# Patient Record
Sex: FEMALE | Race: WHITE | NOT HISPANIC OR LATINO | Employment: OTHER | URBAN - METROPOLITAN AREA
[De-identification: names, ages, dates, MRNs, and addresses within clinical notes are randomized per-mention and may not be internally consistent; named-entity substitution may affect disease eponyms.]

---

## 2018-07-02 ENCOUNTER — OFFICE VISIT (OUTPATIENT)
Dept: URGENT CARE | Facility: CLINIC | Age: 63
End: 2018-07-02
Payer: COMMERCIAL

## 2018-07-02 VITALS
DIASTOLIC BLOOD PRESSURE: 66 MMHG | RESPIRATION RATE: 18 BRPM | WEIGHT: 128 LBS | SYSTOLIC BLOOD PRESSURE: 103 MMHG | HEART RATE: 83 BPM | HEIGHT: 70 IN | TEMPERATURE: 99 F | BODY MASS INDEX: 18.32 KG/M2 | OXYGEN SATURATION: 98 %

## 2018-07-02 DIAGNOSIS — N30.01 ACUTE CYSTITIS WITH HEMATURIA: Primary | ICD-10-CM

## 2018-07-02 DIAGNOSIS — Z76.89 ENCOUNTER TO ESTABLISH CARE: ICD-10-CM

## 2018-07-02 LAB
BILIRUB UR QL STRIP: NEGATIVE
GLUCOSE UR QL STRIP: NEGATIVE
KETONES UR QL STRIP: NEGATIVE
LEUKOCYTE ESTERASE UR QL STRIP: POSITIVE
PH, POC UA: 5.5 (ref 5–8)
POC BLOOD, URINE: POSITIVE
POC NITRATES, URINE: NEGATIVE
PROT UR QL STRIP: POSITIVE
SP GR UR STRIP: 1.01 (ref 1–1.03)
UROBILINOGEN UR STRIP-ACNC: ABNORMAL (ref 0.1–1.1)

## 2018-07-02 PROCEDURE — 99203 OFFICE O/P NEW LOW 30 MIN: CPT | Mod: 25,S$GLB,, | Performed by: NURSE PRACTITIONER

## 2018-07-02 PROCEDURE — 81003 URINALYSIS AUTO W/O SCOPE: CPT | Mod: QW,S$GLB,, | Performed by: NURSE PRACTITIONER

## 2018-07-02 PROCEDURE — 3008F BODY MASS INDEX DOCD: CPT | Mod: CPTII,S$GLB,, | Performed by: NURSE PRACTITIONER

## 2018-07-02 RX ORDER — NITROFURANTOIN 25; 75 MG/1; MG/1
100 CAPSULE ORAL 2 TIMES DAILY
Qty: 14 CAPSULE | Refills: 0 | Status: SHIPPED | OUTPATIENT
Start: 2018-07-02 | End: 2018-07-09

## 2018-07-02 RX ORDER — PHENAZOPYRIDINE HYDROCHLORIDE 100 MG/1
100 TABLET, FILM COATED ORAL 3 TIMES DAILY PRN
Qty: 21 TABLET | Refills: 0 | Status: SHIPPED | OUTPATIENT
Start: 2018-07-02 | End: 2018-07-09

## 2018-07-02 NOTE — PROGRESS NOTES
"Subjective:       Patient ID: Renata Garcia is a 62 y.o. female.    Vitals:  height is 5' 10" (1.778 m) and weight is 58.1 kg (128 lb). Her temperature is 99.3 °F (37.4 °C). Her blood pressure is 103/66 and her pulse is 83. Her respiration is 18 and oxygen saturation is 98%.     Chief Complaint: Urinary Tract Infection    The patient presents to the clinic today with complaints of worsening dysuria with urgency x 2 days. She has previously had a bladder infection 1 other time many years ago. Denies n/v/d. Denies fever. Mild suprapubic pain. Denies vaginal discharge or concerns for STDs.       Urinary Tract Infection    This is a new problem. The current episode started yesterday. The problem occurs every urination. The problem has been unchanged. The quality of the pain is described as aching and burning. The pain is at a severity of 5/10. The pain is mild. There has been no fever. She is sexually active. There is no history of pyelonephritis. Associated symptoms include frequency, hematuria and urgency. Pertinent negatives include no chills, nausea or vomiting. She has tried nothing for the symptoms. The treatment provided no relief.     Review of Systems   Constitution: Negative for chills and fever.   Skin: Negative for itching.   Musculoskeletal: Negative for back pain.   Gastrointestinal: Negative for abdominal pain, nausea and vomiting.   Genitourinary: Positive for dysuria, frequency, hematuria and urgency. Negative for genital sores, missed menses and non-menstrual bleeding.   All other systems reviewed and are negative.      Objective:      Physical Exam   Constitutional: She is oriented to person, place, and time. She appears well-developed and well-nourished.   HENT:   Head: Normocephalic and atraumatic.   Right Ear: External ear normal.   Left Ear: External ear normal.   Nose: Nose normal. No nasal deformity. No epistaxis.   Mouth/Throat: Oropharynx is clear and moist and mucous membranes are normal. "   Eyes: Conjunctivae and lids are normal.   Neck: Trachea normal, normal range of motion and phonation normal. Neck supple.   Cardiovascular: Normal rate, regular rhythm, normal heart sounds and normal pulses.    Pulmonary/Chest: Effort normal and breath sounds normal.   Abdominal: Soft. Normal appearance and bowel sounds are normal. She exhibits no distension and no mass. There is tenderness in the suprapubic area. There is no rigidity, no rebound, no guarding, no CVA tenderness, no tenderness at McBurney's point and negative Riojas's sign.   Neurological: She is alert and oriented to person, place, and time.   Skin: Skin is warm, dry and intact.   Psychiatric: She has a normal mood and affect. Her speech is normal and behavior is normal. Cognition and memory are normal.   Nursing note and vitals reviewed.        Results for orders placed or performed in visit on 07/02/18   POCT Urinalysis, Dipstick, Automated, W/O Scope   Result Value Ref Range    POC Blood, Urine Positive (A) Negative    POC Bilirubin, Urine Negative Negative    POC Urobilinogen, Urine Norm 0.1 - 1.1    POC Ketones, Urine Negative Negative    POC Protein, Urine Positive (A) Negative    POC Nitrates, Urine Negative Negative    POC Glucose, Urine Negative Negative    pH, UA 5.5 5 - 8    POC Specific Gravity, Urine 1.015 1.003 - 1.029    POC Leukocytes, Urine Positive (A) Negative       Assessment:       1. Acute cystitis with hematuria    2. Encounter to establish care        Plan:         Acute cystitis with hematuria  -     POCT Urinalysis, Dipstick, Automated, W/O Scope  -     nitrofurantoin, macrocrystal-monohydrate, (MACROBID) 100 MG capsule; Take 1 capsule (100 mg total) by mouth 2 (two) times daily. for 7 days  Dispense: 14 capsule; Refill: 0  -     phenazopyridine (PYRIDIUM) 100 MG tablet; Take 1 tablet (100 mg total) by mouth 3 (three) times daily as needed for Pain.  Dispense: 21 tablet; Refill: 0    Encounter to establish care  -      "Ambulatory referral to Internal Medicine      Patient Instructions   Bladder Infection, Female (Adult)    Urine is normally doesn't have any bacteria in it. But bacteria can get into the urinary tract from the skin around the rectum. Or they can travel in the blood from elsewhere in the body. Once they are in your urinary tract, they can cause infection in the urethra (urethritis), the bladder (cystitis), or the kidneys (pyelonephritis).  The most common place for an infection is in the bladder. This is called a bladder infection. This is one of the most common infections in women. Most bladder infections are easily treated. They are not serious unless the infection spreads to the kidney.  The phrases "bladder infection," "UTI," and "cystitis" are often used to describe the same thing. But they are not always the same. Cystitis is an inflammation of the bladder. The most common cause of cystitis is an infection.  Symptoms  The infection causes inflammation in the urethra and bladder. This causes many of the symptoms. The most common symptoms of a bladder infection are:  · Pain or burning when urinating  · Having to urinate more often than usual  · Urgent need to urinate  · Only a small amount of urine comes out  · Blood in urine  · Abdominal discomfort. This is usually in the lower abdomen above the pubic bone.  · Cloudy urine  · Strong- or bad-smelling urine  · Unable to urinate (urinary retention)  · Unable to hold urine in (urinary incontinence)  · Fever  · Loss of appetite  · Confusion (in older adults)  Causes  Bladder infections are not contagious. You can't get one from someone else, from a toilet seat, or from sharing a bath.  The most common cause of bladder infections is bacteria from the bowels. The bacteria get onto the skin around the opening of the urethra. From there, they can get into the urine and travel up to the bladder, causing inflammation and infection. This usually happens because " of:  · Wiping improperly after urinating. Always wipe from front to back.  · Bowel incontinence  · Pregnancy  · Procedures such as having a catheter inserted  · Older age  · Not emptying your bladder. This can allow bacteria a chance to grow in your urine.  · Dehydration  · Constipation  · Sex  · Use of a diaphragm for birth control   Treatment  Bladder infections are diagnosed by a urine test. They are treated with antibiotics and usually clear up quickly without complications. Treatment helps prevent a more serious kidney infection.  Medicines  Medicines can help in the treatment of a bladder infection:  · Take antibiotics until they are used up, even if you feel better. It is important to finish them to make sure the infection has cleared.  · You can use acetaminophen or ibuprofen for pain, fever, or discomfort, unless another medicine was prescribed. If you have chronic liver or kidney disease, talk with your healthcare provider before using these medicines. Also talk with your provider if you've ever had a stomach ulcer or gastrointestinal bleeding, or are taking blood-thinner medicines.  · If you are given phenazopydridine to reduce burning with urination, it will cause your urine to become a bright orange color. This can stain clothing.  Care and prevention  These self-care steps can help prevent future infections:  · Drink plenty of fluids to prevent dehydration and flush out your bladder. Do this unless you must restrict fluids for other health reasons, or your doctor told you not to.  · Proper cleaning after going to the bathroom is important. Wipe from front to back after using the toilet to prevent the spread of bacteria.  · Urinate more often. Don't try to hold urine in for a long time.  · Wear loose-fitting clothes and cotton underwear. Avoid tight-fitting pants.  · Improve your diet and prevent constipation. Eat more fresh fruit and vegetables, and fiber, and less junk and fatty foods.  · Avoid sex  until your symptoms are gone.  · Avoid caffeine, alcohol, and spicy foods. These can irritate your bladder.  · Urinate right after intercourse to flush out your bladder.  · If you use birth control pills and have frequent bladder infections, discuss it with your doctor.  Follow-up care  Call your healthcare provider if all symptoms are not gone after 3 days of treatment. This is especially important if you have repeat infections.  If a culture was done, you will be told if your treatment needs to be changed. If directed, you can call to find out the results.  If X-rays were done, you will be told if the results will affect your treatment.  Call 911  Call 911 if any of the following occur:  · Trouble breathing  · Hard to wake up or confusion  · Fainting or loss of consciousness  · Rapid heart rate  When to seek medical advice  Call your healthcare provider right away if any of these occur:  · Fever of 100.4ºF (38.0ºC) or higher, or as directed by your healthcare provider  · Symptoms are not better by the third day of treatment  · Back or belly (abdominal) pain that gets worse  · Repeated vomiting, or unable to keep medicine down  · Weakness or dizziness  · Vaginal discharge  · Pain, redness, or swelling in the outer vaginal area (labia)  Date Last Reviewed: 10/1/2016  © 4425-1706 [employer]. [address] [place] [address], [place] [address]. All rights reserved. This information is not intended as a substitute for professional medical care. Always follow your healthcare professional's instructions.             UTI   If your condition worsens or fails to improve we recommend that you receive another evaluation at the ER immediately or contact your PCP to discuss your concerns or return here. You must understand that you've received an urgent care treatment only and that you may be released before all your medical problems are known or treated. You the patient will arrange for followup care as instructed.   If  you had cultures done it will take 3-5 days to result. We will call you with the result.   If you are are female and on BCP use additional methods to prevent pregnancy while on the antibiotics and for one cycle after.   Cranberry juice may help. Get the 100% cranberry juice and mix 4 oz of juice with 4 oz of water and drink this 8 oz glass of liquid once a day    -Antibiotics for 7 days, complete even if symptoms improve.  -Pyridium as needed for the symptoms.  -Fluids and cranberry juice.  -Follow up with primary care doctor.  I have given you an [facility] doctor referral. If you don't hear from them in a few days call [phone]      Please follow up with your Primary care provider within 2-5 days if your signs and symptoms have not resolved or worsen.     If your condition worsens or fails to improve we recommend that you receive another evaluation at the emergency room immediately or contact your primary medical clinic to discuss your concerns.   You must understand that you have received an Urgent Care treatment only and that you may be released before all of your medical problems are known or treated. You, the patient, will arrange for follow up care as instructed.

## 2018-07-02 NOTE — PATIENT INSTRUCTIONS
"Bladder Infection, Female (Adult)    Urine is normally doesn't have any bacteria in it. But bacteria can get into the urinary tract from the skin around the rectum. Or they can travel in the blood from elsewhere in the body. Once they are in your urinary tract, they can cause infection in the urethra (urethritis), the bladder (cystitis), or the kidneys (pyelonephritis).  The most common place for an infection is in the bladder. This is called a bladder infection. This is one of the most common infections in women. Most bladder infections are easily treated. They are not serious unless the infection spreads to the kidney.  The phrases "bladder infection," "UTI," and "cystitis" are often used to describe the same thing. But they are not always the same. Cystitis is an inflammation of the bladder. The most common cause of cystitis is an infection.  Symptoms  The infection causes inflammation in the urethra and bladder. This causes many of the symptoms. The most common symptoms of a bladder infection are:  · Pain or burning when urinating  · Having to urinate more often than usual  · Urgent need to urinate  · Only a small amount of urine comes out  · Blood in urine  · Abdominal discomfort. This is usually in the lower abdomen above the pubic bone.  · Cloudy urine  · Strong- or bad-smelling urine  · Unable to urinate (urinary retention)  · Unable to hold urine in (urinary incontinence)  · Fever  · Loss of appetite  · Confusion (in older adults)  Causes  Bladder infections are not contagious. You can't get one from someone else, from a toilet seat, or from sharing a bath.  The most common cause of bladder infections is bacteria from the bowels. The bacteria get onto the skin around the opening of the urethra. From there, they can get into the urine and travel up to the bladder, causing inflammation and infection. This usually happens because of:  · Wiping improperly after urinating. Always wipe from front to " back.  · Bowel incontinence  · Pregnancy  · Procedures such as having a catheter inserted  · Older age  · Not emptying your bladder. This can allow bacteria a chance to grow in your urine.  · Dehydration  · Constipation  · Sex  · Use of a diaphragm for birth control   Treatment  Bladder infections are diagnosed by a urine test. They are treated with antibiotics and usually clear up quickly without complications. Treatment helps prevent a more serious kidney infection.  Medicines  Medicines can help in the treatment of a bladder infection:  · Take antibiotics until they are used up, even if you feel better. It is important to finish them to make sure the infection has cleared.  · You can use acetaminophen or ibuprofen for pain, fever, or discomfort, unless another medicine was prescribed. If you have chronic liver or kidney disease, talk with your healthcare provider before using these medicines. Also talk with your provider if you've ever had a stomach ulcer or gastrointestinal bleeding, or are taking blood-thinner medicines.  · If you are given phenazopydridine to reduce burning with urination, it will cause your urine to become a bright orange color. This can stain clothing.  Care and prevention  These self-care steps can help prevent future infections:  · Drink plenty of fluids to prevent dehydration and flush out your bladder. Do this unless you must restrict fluids for other health reasons, or your doctor told you not to.  · Proper cleaning after going to the bathroom is important. Wipe from front to back after using the toilet to prevent the spread of bacteria.  · Urinate more often. Don't try to hold urine in for a long time.  · Wear loose-fitting clothes and cotton underwear. Avoid tight-fitting pants.  · Improve your diet and prevent constipation. Eat more fresh fruit and vegetables, and fiber, and less junk and fatty foods.  · Avoid sex until your symptoms are gone.  · Avoid caffeine, alcohol, and spicy  foods. These can irritate your bladder.  · Urinate right after intercourse to flush out your bladder.  · If you use birth control pills and have frequent bladder infections, discuss it with your doctor.  Follow-up care  Call your healthcare provider if all symptoms are not gone after 3 days of treatment. This is especially important if you have repeat infections.  If a culture was done, you will be told if your treatment needs to be changed. If directed, you can call to find out the results.  If X-rays were done, you will be told if the results will affect your treatment.  Call 911  Call 911 if any of the following occur:  · Trouble breathing  · Hard to wake up or confusion  · Fainting or loss of consciousness  · Rapid heart rate  When to seek medical advice  Call your healthcare provider right away if any of these occur:  · Fever of 100.4ºF (38.0ºC) or higher, or as directed by your healthcare provider  · Symptoms are not better by the third day of treatment  · Back or belly (abdominal) pain that gets worse  · Repeated vomiting, or unable to keep medicine down  · Weakness or dizziness  · Vaginal discharge  · Pain, redness, or swelling in the outer vaginal area (labia)  Date Last Reviewed: 10/1/2016  © 6519-5271 [employer]. [address] [place] [address], [place] [address]. All rights reserved. This information is not intended as a substitute for professional medical care. Always follow your healthcare professional's instructions.             UTI   If your condition worsens or fails to improve we recommend that you receive another evaluation at the ER immediately or contact your PCP to discuss your concerns or return here. You must understand that you've received an urgent care treatment only and that you may be released before all your medical problems are known or treated. You the patient will arrange for followup care as instructed.   If you had cultures done it will take 3-5 days to result. We will call  you with the result.   If you are are female and on BCP use additional methods to prevent pregnancy while on the antibiotics and for one cycle after.   Cranberry juice may help. Get the 100% cranberry juice and mix 4 oz of juice with 4 oz of water and drink this 8 oz glass of liquid once a day    -Antibiotics for 7 days, complete even if symptoms improve.  -Pyridium as needed for the symptoms.  -Fluids and cranberry juice.  -Follow up with primary care doctor.  I have given you an [facility] doctor referral. If you don't hear from them in a few days call [phone]      Please follow up with your Primary care provider within 2-5 days if your signs and symptoms have not resolved or worsen.     If your condition worsens or fails to improve we recommend that you receive another evaluation at the emergency room immediately or contact your primary medical clinic to discuss your concerns.   You must understand that you have received an Urgent Care treatment only and that you may be released before all of your medical problems are known or treated. You, the patient, will arrange for follow up care as instructed.

## 2018-07-17 ENCOUNTER — OFFICE VISIT (OUTPATIENT)
Dept: FAMILY MEDICINE | Facility: CLINIC | Age: 63
End: 2018-07-17
Payer: COMMERCIAL

## 2018-07-17 VITALS
TEMPERATURE: 99 F | OXYGEN SATURATION: 97 % | HEART RATE: 82 BPM | WEIGHT: 126.13 LBS | HEIGHT: 70 IN | BODY MASS INDEX: 18.06 KG/M2 | DIASTOLIC BLOOD PRESSURE: 60 MMHG | SYSTOLIC BLOOD PRESSURE: 102 MMHG

## 2018-07-17 DIAGNOSIS — Z86.39 HISTORY OF DEHYDRATION: ICD-10-CM

## 2018-07-17 DIAGNOSIS — F17.200 TOBACCO DEPENDENCE: ICD-10-CM

## 2018-07-17 DIAGNOSIS — N30.01 ACUTE CYSTITIS WITH HEMATURIA: Primary | ICD-10-CM

## 2018-07-17 LAB
BILIRUB SERPL-MCNC: NORMAL MG/DL
BLOOD URINE, POC: 250
COLOR, POC UA: YELLOW
GLUCOSE UR QL STRIP: NORMAL
KETONES UR QL STRIP: NORMAL
LEUKOCYTE ESTERASE URINE, POC: NORMAL
NITRITE, POC UA: NORMAL
PH, POC UA: 5
PROTEIN, POC: NORMAL
SPECIFIC GRAVITY, POC UA: 1.03
UROBILINOGEN, POC UA: NORMAL

## 2018-07-17 PROCEDURE — 81001 URINALYSIS AUTO W/SCOPE: CPT | Mod: S$GLB,,, | Performed by: FAMILY MEDICINE

## 2018-07-17 PROCEDURE — 3008F BODY MASS INDEX DOCD: CPT | Mod: CPTII,S$GLB,, | Performed by: FAMILY MEDICINE

## 2018-07-17 PROCEDURE — 99214 OFFICE O/P EST MOD 30 MIN: CPT | Mod: 25,S$GLB,, | Performed by: FAMILY MEDICINE

## 2018-07-17 PROCEDURE — 99999 PR PBB SHADOW E&M-EST. PATIENT-LVL III: CPT | Mod: PBBFAC,,, | Performed by: FAMILY MEDICINE

## 2018-07-17 RX ORDER — DOXYCYCLINE HYCLATE 100 MG
100 TABLET ORAL EVERY 12 HOURS
Qty: 14 TABLET | Refills: 0 | Status: SHIPPED | OUTPATIENT
Start: 2018-07-17

## 2018-07-17 NOTE — PROGRESS NOTES
Office Visit    Patient Name: Renata Garcia    : 1955  MRN: [id number]      Assessment/Plan:  Renata Garcia is a 62 y.o. female who presents today for :    Acute cystitis with hematuria  History of dehydration  -     POCT urinalysis, dipstick or tablet reag  -     Urine culture; Future; Expected date: 2018  -     doxycycline (VIBRA-TABS) 100 MG tablet; Take 1 tablet (100 mg total) by mouth every 12 (twelve) hours.  Dispense: 14 tablet; Refill: 0    -AF VSS - start patient on a different Abx as she was recently on Macrobid, check UCx  -advised adequate hydration and proper hygiene  -RTC if Sx worsens or call clinic back if pt has any concerns.      Tobacco dependence  -Recd patient to quit tobacco usage, and advised on several options to quit and the benefits of quitting, such as: decreasing the risk of cancer, HTN, CVD, among other diseases.  -Patient is in pre-contemplative state of change      Follow-up for any evaluation as needed.     This note was created by combination of typed  and Dragon dictation.  Transcription errors may be present.  If there are any questions, please contact me.      ----------------------------------------------------------------------------------------------------------------------      HPI:  Renata is a 62 y.o. female who presents today for:    Follow-up      This patient has multiple medical diagnoses as noted below.    This patient is new to me     Pt complains of increased frequency with urination that started yesterday - was recently treated for UTI at urgent care over 2 weeks ago with Macrobid, which she stated resolved, but Sx returned two days ago. No douching. Her last UTI before the recent episode had been several years ago. 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 Does not drink a lot of water, and endorses consuming a lot fo sodas.  No flank  "pain/bladder pain/hematuria.  No VB/VD.      Additional ROS    No F/C/wt changes  No CP/SOB/palpitations/swelling  No cough/wheezing  No nausea/vomiting/abd pain  No muscle aches/joint pain  No rashes  No weakness/HA/tingling/numbness      Patient Active Problem List   Diagnosis    Palpitations    Abnormal EKG    Chest pain, atypical       Current Medications  Medications reviewed and updated.       History reviewed. No pertinent surgical history.    Family History   Problem Relation Age of Onset    Cancer Sister     Hepatitis Sister     Cancer Maternal Uncle        Social History     Social History    Marital status:      Spouse name: N/A    Number of children: N/A    Years of education: N/A     Occupational History    Not on file.     Social History Main Topics    Smoking status: Current Every Day Smoker     Packs/day: 1.00     Types: Cigarettes    Smokeless tobacco: Not on file    Alcohol use Not on file    Drug use: Unknown    Sexual activity: Not on file     Other Topics Concern    Not on file     Social History Narrative    No narrative on file           Allergies   Review of patient's allergies indicates:   Allergen Reactions    Codeine Nausea And Vomiting             Review of Systems  See HPI      Physical Exam  /60   Pulse 82   Temp 98.5 °F (36.9 °C) (Oral)   Ht 5' 10" (1.778 m)   Wt 57.2 kg (126 lb 1.7 oz)   LMP  (LMP Unknown)   SpO2 97%   BMI 18.09 kg/m²     GEN: NAD, well developed, pleasant, well nourished  HEENT: NCAT, PERRLA, EOMI, sclera clear, anicteric  LUNGS: CTAB, no w/r/r, normal effort  HEART: RRR, normal S1 and S2, no m/r/g, no palpitations, normal pulses  ABD: s/nt/nd, NABS, +NO suprapubic tenderness. NO CVA/flank TTP  : patient deferred  SKIN: warm and dry with normal turgor, no rashes, no other lesions  PSYCH: AOx3, appropriate mood and affect                  "

## 2018-07-18 ENCOUNTER — LAB VISIT (OUTPATIENT)
Dept: LAB | Facility: HOSPITAL | Age: 63
End: 2018-07-18
Attending: FAMILY MEDICINE
Payer: COMMERCIAL

## 2018-07-18 DIAGNOSIS — N30.01 ACUTE CYSTITIS WITH HEMATURIA: ICD-10-CM

## 2018-07-18 PROCEDURE — 87086 URINE CULTURE/COLONY COUNT: CPT

## 2018-07-19 LAB — BACTERIA UR CULT: NO GROWTH

## 2021-08-11 ENCOUNTER — IMMUNIZATION (OUTPATIENT)
Dept: PRIMARY CARE CLINIC | Facility: CLINIC | Age: 66
End: 2021-08-11
Payer: COMMERCIAL

## 2021-08-11 DIAGNOSIS — Z23 NEED FOR VACCINATION: Primary | ICD-10-CM

## 2021-08-11 PROCEDURE — 91300 COVID-19, MRNA, LNP-S, PF, 30 MCG/0.3 ML DOSE VACCINE: CPT | Mod: S$GLB,,, | Performed by: INTERNAL MEDICINE

## 2021-08-11 PROCEDURE — 0001A COVID-19, MRNA, LNP-S, PF, 30 MCG/0.3 ML DOSE VACCINE: ICD-10-PCS | Mod: CV19,S$GLB,, | Performed by: INTERNAL MEDICINE

## 2021-08-11 PROCEDURE — 91300 COVID-19, MRNA, LNP-S, PF, 30 MCG/0.3 ML DOSE VACCINE: ICD-10-PCS | Mod: S$GLB,,, | Performed by: INTERNAL MEDICINE

## 2021-08-11 PROCEDURE — 0001A COVID-19, MRNA, LNP-S, PF, 30 MCG/0.3 ML DOSE VACCINE: CPT | Mod: CV19,S$GLB,, | Performed by: INTERNAL MEDICINE

## 2022-09-23 ENCOUNTER — OFFICE VISIT (OUTPATIENT)
Dept: URGENT CARE | Facility: CLINIC | Age: 67
End: 2022-09-23
Payer: MEDICARE

## 2022-09-23 VITALS
SYSTOLIC BLOOD PRESSURE: 102 MMHG | TEMPERATURE: 99 F | DIASTOLIC BLOOD PRESSURE: 72 MMHG | RESPIRATION RATE: 20 BRPM | BODY MASS INDEX: 18.04 KG/M2 | HEIGHT: 70 IN | WEIGHT: 126 LBS | OXYGEN SATURATION: 95 % | HEART RATE: 103 BPM

## 2022-09-23 DIAGNOSIS — F17.200 NEEDS SMOKING CESSATION EDUCATION: ICD-10-CM

## 2022-09-23 DIAGNOSIS — R39.9 UTI SYMPTOMS: Primary | ICD-10-CM

## 2022-09-23 DIAGNOSIS — R30.0 DYSURIA: ICD-10-CM

## 2022-09-23 LAB
BILIRUB UR QL STRIP: NEGATIVE
GLUCOSE UR QL STRIP: NEGATIVE
KETONES UR QL STRIP: NEGATIVE
LEUKOCYTE ESTERASE UR QL STRIP: NEGATIVE
PH, POC UA: 5
POC BLOOD, URINE: POSITIVE
POC NITRATES, URINE: NEGATIVE
PROT UR QL STRIP: POSITIVE
SP GR UR STRIP: 1.02 (ref 1–1.03)
UROBILINOGEN UR STRIP-ACNC: NORMAL (ref 0.1–1.1)

## 2022-09-23 PROCEDURE — 81003 POCT URINALYSIS, DIPSTICK, AUTOMATED, W/O SCOPE: ICD-10-PCS | Mod: QW,S$GLB,,

## 2022-09-23 PROCEDURE — 99203 PR OFFICE/OUTPT VISIT, NEW, LEVL III, 30-44 MIN: ICD-10-PCS | Mod: S$GLB,,,

## 2022-09-23 PROCEDURE — 81003 URINALYSIS AUTO W/O SCOPE: CPT | Mod: QW,S$GLB,,

## 2022-09-23 PROCEDURE — 99203 OFFICE O/P NEW LOW 30 MIN: CPT | Mod: S$GLB,,,

## 2022-09-23 RX ORDER — PHENAZOPYRIDINE HYDROCHLORIDE 100 MG/1
100 TABLET, FILM COATED ORAL 3 TIMES DAILY PRN
Qty: 20 TABLET | Refills: 0 | Status: SHIPPED | OUTPATIENT
Start: 2022-09-23 | End: 2022-09-28

## 2022-09-23 RX ORDER — SULFAMETHOXAZOLE AND TRIMETHOPRIM 800; 160 MG/1; MG/1
1 TABLET ORAL 2 TIMES DAILY
Qty: 10 TABLET | Refills: 0 | Status: SHIPPED | OUTPATIENT
Start: 2022-09-23 | End: 2022-09-28

## 2022-09-23 NOTE — PROGRESS NOTES
"Subjective:       Patient ID: Renata Garcia is a 66 y.o. female.    Vitals:  height is 5' 10" (1.778 m) and weight is 57.2 kg (126 lb). Her tympanic temperature is 98.5 °F (36.9 °C). Her blood pressure is 102/72 and her pulse is 103. Her respiration is 20 and oxygen saturation is 95%.     Chief Complaint: Dysuria    66-year-old female presents complaints of dysuria for 3 days, frequency and lower pelvic pain.  Patient states that she took some leftover Keflex that her sister had at home for the last few days.  She denies any fever back pain.  Patient states that the antibiotic is not helped relieve her symptoms.  No history of kidney stones or kidney infection.    Dysuria   This is a new problem. The current episode started in the past 7 days. The problem has been unchanged. The quality of the pain is described as burning. The pain is at a severity of 7/10. The pain is moderate. There has been no fever. She is Not sexually active. Associated symptoms include frequency and urgency. Pertinent negatives include no chills, discharge, flank pain, hematuria, nausea or vomiting. She has tried antibiotics for the symptoms. The treatment provided mild relief. There is no history of hypertension or kidney stones.     Constitution: Negative for chills, sweating, fatigue and fever.   Gastrointestinal:  Negative for nausea and vomiting.   Genitourinary:  Positive for dysuria, frequency and urgency. Negative for flank pain and hematuria.     Objective:      Physical Exam   Constitutional: She is oriented to person, place, and time. She appears well-developed.   HENT:   Head: Normocephalic and atraumatic.   Ears:   Right Ear: External ear normal.   Left Ear: External ear normal.   Nose: Nose normal. No nasal deformity. No epistaxis.   Mouth/Throat: Oropharynx is clear and moist and mucous membranes are normal.   Eyes: Lids are normal.   Neck: Trachea normal and phonation normal. Neck supple.   Cardiovascular: Normal pulses. "   Pulmonary/Chest: Effort normal.   Abdominal: Normal appearance and bowel sounds are normal. She exhibits no distension. Soft. flat abdomen There is abdominal tenderness in the suprapubic area. There is no rebound, no guarding, no left CVA tenderness and no right CVA tenderness.   Neurological: She is alert and oriented to person, place, and time.   Skin: Skin is warm, dry and intact.   Psychiatric: Her speech is normal and behavior is normal.   Nursing note and vitals reviewed.      Results for orders placed or performed in visit on 09/23/22   POCT Urinalysis, Dipstick, Automated, W/O Scope   Result Value Ref Range    POC Blood, Urine Positive (A) Negative    POC Bilirubin, Urine Negative Negative    POC Urobilinogen, Urine normal 0.1 - 1.1    POC Ketones, Urine Negative Negative    POC Protein, Urine Positive (A) Negative    POC Nitrates, Urine Negative Negative    POC Glucose, Urine Negative Negative    pH, UA 5.0     POC Specific Gravity, Urine 1.025 1.003 - 1.029    POC Leukocytes, Urine Negative Negative       Assessment:       1. UTI symptoms    2. Dysuria          Plan:       Discussed urinalysis results with patient as above.  I suspect a urinalysis is not accurate due to patient taking Keflex at home.  I will send urine culture for definitive testing but likely will not be accurate due to antibiotics recently.  Patient will be placed on Bactrim for treatment of UTI symptoms and advised to return to clinic if symptoms do not resolve.  UTI symptoms    Dysuria  -     POCT Urinalysis, Dipstick, Automated, W/O Scope  -     sulfamethoxazole-trimethoprim 800-160mg (BACTRIM DS) 800-160 mg Tab; Take 1 tablet by mouth 2 (two) times daily. for 5 days  Dispense: 10 tablet; Refill: 0  -     phenazopyridine (PYRIDIUM) 100 MG tablet; Take 1 tablet (100 mg total) by mouth 3 (three) times daily as needed for Pain.  Dispense: 20 tablet; Refill: 0

## 2024-07-11 ENCOUNTER — OFFICE VISIT (OUTPATIENT)
Dept: FAMILY MEDICINE | Facility: CLINIC | Age: 69
End: 2024-07-11
Payer: MEDICARE

## 2024-07-11 VITALS
OXYGEN SATURATION: 97 % | WEIGHT: 128.63 LBS | SYSTOLIC BLOOD PRESSURE: 114 MMHG | BODY MASS INDEX: 18.42 KG/M2 | DIASTOLIC BLOOD PRESSURE: 70 MMHG | HEIGHT: 70 IN | TEMPERATURE: 98 F | HEART RATE: 76 BPM

## 2024-07-11 DIAGNOSIS — R06.00 DYSPNEA, UNSPECIFIED TYPE: ICD-10-CM

## 2024-07-11 DIAGNOSIS — F17.210 TOBACCO DEPENDENCE DUE TO CIGARETTES: ICD-10-CM

## 2024-07-11 DIAGNOSIS — Z12.11 COLON CANCER SCREENING: ICD-10-CM

## 2024-07-11 DIAGNOSIS — Z78.0 POSTMENOPAUSE: ICD-10-CM

## 2024-07-11 DIAGNOSIS — Z12.31 ENCOUNTER FOR SCREENING MAMMOGRAM FOR MALIGNANT NEOPLASM OF BREAST: ICD-10-CM

## 2024-07-11 DIAGNOSIS — Z00.00 ENCOUNTER FOR MEDICAL EXAMINATION TO ESTABLISH CARE: Primary | ICD-10-CM

## 2024-07-11 DIAGNOSIS — Z00.00 ANNUAL PHYSICAL EXAM: ICD-10-CM

## 2024-07-11 PROCEDURE — 99999 PR PBB SHADOW E&M-EST. PATIENT-LVL III: CPT | Mod: PBBFAC,HCNC,, | Performed by: FAMILY MEDICINE

## 2024-07-11 RX ORDER — ALBUTEROL SULFATE 90 UG/1
1-2 AEROSOL, METERED RESPIRATORY (INHALATION)
Qty: 18 G | Refills: 11 | Status: SHIPPED | OUTPATIENT
Start: 2024-07-11

## 2024-07-11 NOTE — PROGRESS NOTES
Assessment & Plan:    Encounter for medical examination to establish care    Annual physical exam  -     CBC Auto Differential; Future; Expected date: 07/11/2024  -     Comprehensive Metabolic Panel; Future; Expected date: 07/11/2024  -     Hemoglobin A1C; Future; Expected date: 07/11/2024  -     Lipid Panel; Future; Expected date: 07/11/2024  -     Hepatitis C Antibody; Future; Expected date: 07/11/2024  -     HIV 1/2 Ag/Ab (4th Gen); Future; Expected date: 07/11/2024    Fasting labs to be scheduled.    Dyspnea, unspecified type  -     Complete PFT w/ bronchodilator; Future  -     albuterol (PROVENTIL/VENTOLIN HFA) 90 mcg/actuation inhaler; Inhale 1-2 puffs into the lungs every 4 to 6 hours as needed for Wheezing or Shortness of Breath (chest tightness).  Dispense: 18 g; Refill: 11    Suspect COPD.   PFT to be performed.  Albuterol inhaler prescribed for prn use.     Tobacco dependence due to cigarettes  -     CT Chest Lung Screening Low Dose; Future; Expected date: 07/11/2024    Discussed importance of smoking cessation but she is not ready to quit.   She was informed of the availability of the smoking cessation program.  LDCT to be performed for lung CA screening.    Colon cancer screening  -     Cologuard Screening (Multitarget Stool DNA); Future; Expected date: 07/11/2024    Recommended screening colonoscopy due to higher risk of cancer from smoking, but patient declined.  Cologuard to be performed - she was informed that a positive test indicates higher risk of colon CA and that a diagnostic colonoscopy would be needed for evaluation.    Postmenopause  -     DXA Bone Density Axial Skeleton 1 or more sites; Future; Expected date: 07/11/2024    Encounter for screening mammogram for malignant neoplasm of breast  -     Mammo Digital Screening Bilat; Future; Expected date: 07/11/2024      Health maintenance reviewed & addressed above.    Declines pneumococcal vaccines.     Recommended to have RSV, shingles, and  "tetanus vaccines.      Follow-up: Follow up in about 2-4 weeks (around 7/25/2024) for test review.  ______________________________________________________________________    Chief Complaint  Chief Complaint   Patient presents with    Establish Care     Shortness of breath 4 to 5 months       HPI  Renata Garcia is a 68 y.o. female with medical diagnoses as listed in the medical history and problem list that presents to the office to establish care. Patient c/o "difficulty catching my breath" that began while doing her usual activities about 4-5 months ago. She experiences this while climbing stairs and while laying down as well. Episodes resolve spontaneously. Denies cough, chest pain, wheezing, leg swelling, dizziness, or a change in her chronic headaches. She is a smoker and was a  for many years. She had a largely unremarkable stress echo in 2015 other than trivial mitral and tricuspid regurgitation. She was prescribed metoprolol during that time but did not take it out of concern that it would cause fatigue while working.     Health Maintenance         Date Due Completion Date    Hepatitis C Screening Never done ---    Pneumococcal Vaccines (Age 65+) (1 of 2 - PCV) Never done ---    TETANUS VACCINE Never done ---    DEXA Scan Never done ---    Colorectal Cancer Screening Never done ---    Shingles Vaccine (1 of 2) Never done ---    Mammogram 04/22/2016 4/22/2015    RSV Vaccine (Age 60+ and Pregnant patients) (1 - 1-dose 60+ series) Never done ---    Lipid Panel 04/15/2020 4/15/2015    COVID-19 Vaccine (3 - 2023-24 season) 09/01/2023 10/30/2021    Influenza Vaccine (1) 09/01/2024 ---              PAST MEDICAL HISTORY:  History reviewed. No pertinent past medical history.    PAST SURGICAL HISTORY:  History reviewed. No pertinent surgical history.    SOCIAL HISTORY:  Social History     Socioeconomic History    Marital status:    Tobacco Use    Smoking status: Every Day     Current packs/day: 1.00     " "Types: Cigarettes    Smokeless tobacco: Never   Substance and Sexual Activity    Alcohol use: Yes     Comment: occ    Drug use: Never    Sexual activity: Not Currently       FAMILY HISTORY:  Family History   Problem Relation Name Age of Onset    Cancer Sister      Hepatitis Sister      Cancer Maternal Uncle         ALLERGIES AND MEDICATIONS: updated and reviewed.  Review of patient's allergies indicates:   Allergen Reactions    Codeine Nausea And Vomiting     Current Outpatient Medications   Medication Sig Dispense Refill    albuterol (PROVENTIL/VENTOLIN HFA) 90 mcg/actuation inhaler Inhale 1-2 puffs into the lungs every 4 to 6 hours as needed for Wheezing or Shortness of Breath (chest tightness). 18 g 11    metoprolol tartrate (LOPRESSOR) 25 MG tablet Take 1 tablet (25 mg total) by mouth 2 (two) times daily as needed (palpitations). 60 tablet 11     No current facility-administered medications for this visit.         ROS  Review of Systems   Constitutional:  Negative for activity change.   Respiratory:  Positive for shortness of breath. Negative for cough, chest tightness and wheezing.    Cardiovascular:  Negative for chest pain and leg swelling.   Neurological:  Positive for headaches (chronic, unchanged). Negative for dizziness, syncope and weakness.           Physical Exam  Vitals:    07/11/24 1152   BP: 114/70   BP Location: Right arm   Patient Position: Sitting   BP Method: Small (Automatic)   Pulse: 76   Temp: 98.2 °F (36.8 °C)   TempSrc: Oral   SpO2: 97%   Weight: 58.3 kg (128 lb 10.2 oz)   Height: 5' 10" (1.778 m)    Body mass index is 18.46 kg/m².  Weight: 58.3 kg (128 lb 10.2 oz)   Height: 5' 10" (177.8 cm)   Physical Exam  Constitutional:       General: She is not in acute distress.     Appearance: She is underweight.   HENT:      Head: Normocephalic and atraumatic.   Neck:      Thyroid: No thyromegaly.      Vascular: No carotid bruit.   Cardiovascular:      Rate and Rhythm: Normal rate and regular " rhythm.      Pulses: Normal pulses.      Heart sounds: Normal heart sounds.   Pulmonary:      Effort: Pulmonary effort is normal. No respiratory distress.      Breath sounds: Normal breath sounds.   Musculoskeletal:      Cervical back: Neck supple.      Right lower leg: No edema.      Left lower leg: No edema.   Lymphadenopathy:      Cervical: No cervical adenopathy.   Skin:     General: Skin is warm and dry.      Findings: No rash.   Neurological:      General: No focal deficit present.      Mental Status: She is alert and oriented to person, place, and time.   Psychiatric:         Mood and Affect: Mood normal.         Behavior: Behavior normal.         Thought Content: Thought content normal.

## 2024-07-11 NOTE — PATIENT INSTRUCTIONS
Please call [phone] to schedule the pulmonary function test (breathing test).     You are due for the pneumococcal, RSV, shingles vaccines, and tetanus.

## 2024-07-13 ENCOUNTER — LAB VISIT (OUTPATIENT)
Dept: LAB | Facility: HOSPITAL | Age: 69
End: 2024-07-13
Attending: FAMILY MEDICINE
Payer: MEDICARE

## 2024-07-13 DIAGNOSIS — Z00.00 ANNUAL PHYSICAL EXAM: ICD-10-CM

## 2024-07-13 LAB
ALBUMIN SERPL BCP-MCNC: 4 G/DL (ref 3.5–5.2)
ALP SERPL-CCNC: 73 U/L (ref 55–135)
ALT SERPL W/O P-5'-P-CCNC: 11 U/L (ref 10–44)
ANION GAP SERPL CALC-SCNC: 8 MMOL/L (ref 8–16)
AST SERPL-CCNC: 16 U/L (ref 10–40)
BASOPHILS # BLD AUTO: 0.07 K/UL (ref 0–0.2)
BASOPHILS NFR BLD: 0.9 % (ref 0–1.9)
BILIRUB SERPL-MCNC: 0.5 MG/DL (ref 0.1–1)
BUN SERPL-MCNC: 13 MG/DL (ref 8–23)
CALCIUM SERPL-MCNC: 9.7 MG/DL (ref 8.7–10.5)
CHLORIDE SERPL-SCNC: 105 MMOL/L (ref 95–110)
CHOLEST SERPL-MCNC: 193 MG/DL (ref 120–199)
CHOLEST/HDLC SERPL: 3.6 {RATIO} (ref 2–5)
CO2 SERPL-SCNC: 27 MMOL/L (ref 23–29)
CREAT SERPL-MCNC: 0.8 MG/DL (ref 0.5–1.4)
DIFFERENTIAL METHOD BLD: NORMAL
EOSINOPHIL # BLD AUTO: 0.3 K/UL (ref 0–0.5)
EOSINOPHIL NFR BLD: 3.7 % (ref 0–8)
ERYTHROCYTE [DISTWIDTH] IN BLOOD BY AUTOMATED COUNT: 13.2 % (ref 11.5–14.5)
EST. GFR  (NO RACE VARIABLE): >60 ML/MIN/1.73 M^2
ESTIMATED AVG GLUCOSE: 103 MG/DL (ref 68–131)
GLUCOSE SERPL-MCNC: 93 MG/DL (ref 70–110)
HBA1C MFR BLD: 5.2 % (ref 4–5.6)
HCT VFR BLD AUTO: 48.2 % (ref 37–48.5)
HCV AB SERPL QL IA: NORMAL
HDLC SERPL-MCNC: 54 MG/DL (ref 40–75)
HDLC SERPL: 28 % (ref 20–50)
HGB BLD-MCNC: 15.6 G/DL (ref 12–16)
HIV 1+2 AB+HIV1 P24 AG SERPL QL IA: NORMAL
IMM GRANULOCYTES # BLD AUTO: 0.01 K/UL (ref 0–0.04)
IMM GRANULOCYTES NFR BLD AUTO: 0.1 % (ref 0–0.5)
LDLC SERPL CALC-MCNC: 113.6 MG/DL (ref 63–159)
LYMPHOCYTES # BLD AUTO: 3.1 K/UL (ref 1–4.8)
LYMPHOCYTES NFR BLD: 39.5 % (ref 18–48)
MCH RBC QN AUTO: 30.1 PG (ref 27–31)
MCHC RBC AUTO-ENTMCNC: 32.4 G/DL (ref 32–36)
MCV RBC AUTO: 93 FL (ref 82–98)
MONOCYTES # BLD AUTO: 0.7 K/UL (ref 0.3–1)
MONOCYTES NFR BLD: 8.5 % (ref 4–15)
NEUTROPHILS # BLD AUTO: 3.7 K/UL (ref 1.8–7.7)
NEUTROPHILS NFR BLD: 47.3 % (ref 38–73)
NONHDLC SERPL-MCNC: 139 MG/DL
NRBC BLD-RTO: 0 /100 WBC
PLATELET # BLD AUTO: 212 K/UL (ref 150–450)
PMV BLD AUTO: 12 FL (ref 9.2–12.9)
POTASSIUM SERPL-SCNC: 4.4 MMOL/L (ref 3.5–5.1)
PROT SERPL-MCNC: 7 G/DL (ref 6–8.4)
RBC # BLD AUTO: 5.18 M/UL (ref 4–5.4)
SODIUM SERPL-SCNC: 140 MMOL/L (ref 136–145)
TRIGL SERPL-MCNC: 127 MG/DL (ref 30–150)
WBC # BLD AUTO: 7.88 K/UL (ref 3.9–12.7)

## 2024-07-13 PROCEDURE — 80053 COMPREHEN METABOLIC PANEL: CPT | Performed by: FAMILY MEDICINE

## 2024-07-13 PROCEDURE — 83036 HEMOGLOBIN GLYCOSYLATED A1C: CPT | Performed by: FAMILY MEDICINE

## 2024-07-13 PROCEDURE — 87389 HIV-1 AG W/HIV-1&-2 AB AG IA: CPT | Performed by: FAMILY MEDICINE

## 2024-07-13 PROCEDURE — 85025 COMPLETE CBC W/AUTO DIFF WBC: CPT | Performed by: FAMILY MEDICINE

## 2024-07-13 PROCEDURE — 36415 COLL VENOUS BLD VENIPUNCTURE: CPT | Mod: PO | Performed by: FAMILY MEDICINE

## 2024-07-13 PROCEDURE — 80061 LIPID PANEL: CPT | Performed by: FAMILY MEDICINE

## 2024-07-13 PROCEDURE — 86803 HEPATITIS C AB TEST: CPT | Performed by: FAMILY MEDICINE

## 2024-07-15 ENCOUNTER — PATIENT MESSAGE (OUTPATIENT)
Dept: FAMILY MEDICINE | Facility: CLINIC | Age: 69
End: 2024-07-15
Payer: MEDICARE

## 2024-07-15 ENCOUNTER — HOSPITAL ENCOUNTER (OUTPATIENT)
Dept: RESPIRATORY THERAPY | Facility: HOSPITAL | Age: 69
Discharge: HOME | End: 2024-07-15
Attending: FAMILY MEDICINE
Payer: MEDICARE

## 2024-07-15 ENCOUNTER — HOSPITAL ENCOUNTER (OUTPATIENT)
Dept: RADIOLOGY | Facility: HOSPITAL | Age: 69
Discharge: HOME | End: 2024-07-15
Attending: FAMILY MEDICINE
Payer: MEDICARE

## 2024-07-15 VITALS — RESPIRATION RATE: 18 BRPM | HEART RATE: 108 BPM | OXYGEN SATURATION: 98 %

## 2024-07-15 DIAGNOSIS — R91.1 PULMONARY NODULE 1 CM OR GREATER IN DIAMETER: Primary | ICD-10-CM

## 2024-07-15 DIAGNOSIS — R06.00 DYSPNEA, UNSPECIFIED TYPE: ICD-10-CM

## 2024-07-15 DIAGNOSIS — F17.210 TOBACCO DEPENDENCE DUE TO CIGARETTES: ICD-10-CM

## 2024-07-15 PROCEDURE — 71271 CT THORAX LUNG CANCER SCR C-: CPT | Mod: 26,,, | Performed by: RADIOLOGY

## 2024-07-15 PROCEDURE — 25000242 PHARM REV CODE 250 ALT 637 W/ HCPCS: Performed by: FAMILY MEDICINE

## 2024-07-15 PROCEDURE — 71271 CT THORAX LUNG CANCER SCR C-: CPT | Mod: TC

## 2024-07-15 PROCEDURE — 94200 LUNG FUNCTION TEST (MBC/MVV): CPT

## 2024-07-15 PROCEDURE — 94729 DIFFUSING CAPACITY: CPT

## 2024-07-15 RX ORDER — ALBUTEROL SULFATE 2.5 MG/.5ML
2.5 SOLUTION RESPIRATORY (INHALATION) ONCE
Status: COMPLETED | OUTPATIENT
Start: 2024-07-15 | End: 2024-07-15

## 2024-07-15 RX ADMIN — ALBUTEROL SULFATE 2.5 MG: 2.5 SOLUTION RESPIRATORY (INHALATION) at 02:07

## 2024-07-17 LAB
BRPFT: NORMAL
DLCO ADJ PRE: 15.87 ML/(MIN*MMHG)
DLCO SINGLE BREATH LLN: 19.76
DLCO SINGLE BREATH PRE REF: 66.1 %
DLCO SINGLE BREATH REF: 25.49
DLCOC SBVA LLN: 3.08
DLCOC SBVA PRE REF: 82.3 %
DLCOC SBVA REF: 4.28
DLCOC SINGLE BREATH LLN: 19.76
DLCOC SINGLE BREATH PRE REF: 62.3 %
DLCOC SINGLE BREATH REF: 25.49
DLCOVA LLN: 3.08
DLCOVA PRE REF: 87.4 %
DLCOVA PRE: 3.74 ML/(MIN*MMHG*L)
DLCOVA REF: 4.28
DLVAADJ PRE: 3.52 ML/(MIN*MMHG*L)
ERVN2 LLN: -16449.24
ERVN2 PRE REF: 82.9 %
ERVN2 PRE: 0.63 L
ERVN2 REF: 0.76
FEF 25 75 CHG: -30.1 %
FEF 25 75 LLN: 1.47
FEF 25 75 POST REF: 23.1 %
FEF 25 75 PRE REF: 33 %
FEF 25 75 REF: 2.87
FET100 CHG: 46 %
FEV1 CHG: -1.3 %
FEV1 FVC CHG: -4.7 %
FEV1 FVC LLN: 65
FEV1 FVC POST REF: 82.4 %
FEV1 FVC PRE REF: 86.4 %
FEV1 FVC REF: 78
FEV1 LLN: 2.03
FEV1 POST REF: 61.8 %
FEV1 PRE REF: 62.6 %
FEV1 REF: 2.76
FRCN2 LLN: 2.23
FRCN2 PRE REF: 110.7 %
FRCN2 REF: 3.05
FVC CHG: 3.5 %
FVC LLN: 2.65
FVC POST REF: 74.2 %
FVC PRE REF: 71.7 %
FVC REF: 3.59
IVC PRE: 2.43 L
IVC SINGLE BREATH LLN: 2.65
IVC SINGLE BREATH PRE REF: 67.7 %
IVC SINGLE BREATH REF: 3.59
PEF CHG: 21.5 %
PEF LLN: 4.79
PEF POST REF: 87.1 %
PEF PRE REF: 71.7 %
PEF REF: 6.84
POST FEF 25 75: 0.66 L/S
POST FET 100: 14.11 SEC
POST FEV1 FVC: 63.98 %
POST FEV1: 1.71 L
POST FVC: 2.67 L
POST PEF: 5.96 L/S
PRE DLCO: 16.85 ML/(MIN*MMHG)
PRE FEF 25 75: 0.94 L/S
PRE FET 100: 9.66 SEC
PRE FEV1 FVC: 67.13 %
PRE FEV1: 1.73 L
PRE FRC N2: 3.38 L
PRE FVC: 2.58 L
PRE PEF: 4.91 L/S
RVN2 LLN: 1.72
RVN2 PRE REF: 119.9 %
RVN2 PRE: 2.75 L
RVN2 REF: 2.29
RVN2TLCN2 LLN: 32.15
RVN2TLCN2 PRE REF: 122 %
RVN2TLCN2 PRE: 50.93 %
RVN2TLCN2 REF: 41.74
TLCN2 LLN: 4.97
TLCN2 PRE REF: 90.6 %
TLCN2 PRE: 5.4 L
TLCN2 REF: 5.96
VA PRE: 4.51 L
VA SINGLE BREATH LLN: 5.81
VA SINGLE BREATH PRE REF: 77.7 %
VA SINGLE BREATH REF: 5.81
VCMAXN2 LLN: 2.65
VCMAXN2 PRE REF: 73.7 %
VCMAXN2 PRE: 2.65 L
VCMAXN2 REF: 3.59

## 2024-07-17 PROCEDURE — 94729 DIFFUSING CAPACITY: CPT | Mod: 26,,, | Performed by: INTERNAL MEDICINE

## 2024-07-17 PROCEDURE — 94060 EVALUATION OF WHEEZING: CPT | Mod: 26,,, | Performed by: INTERNAL MEDICINE

## 2024-07-17 PROCEDURE — 94727 GAS DIL/WSHOT DETER LNG VOL: CPT | Mod: 26,,, | Performed by: INTERNAL MEDICINE

## 2024-07-25 ENCOUNTER — HOSPITAL ENCOUNTER (OUTPATIENT)
Dept: RADIOLOGY | Facility: CLINIC | Age: 69
Discharge: HOME | End: 2024-07-25
Attending: FAMILY MEDICINE
Payer: MEDICARE

## 2024-07-25 ENCOUNTER — HOSPITAL ENCOUNTER (OUTPATIENT)
Dept: RADIOLOGY | Facility: HOSPITAL | Age: 69
Discharge: HOME | End: 2024-07-25
Attending: FAMILY MEDICINE
Payer: MEDICARE

## 2024-07-25 DIAGNOSIS — Z78.0 POSTMENOPAUSE: ICD-10-CM

## 2024-07-25 DIAGNOSIS — Z12.31 ENCOUNTER FOR SCREENING MAMMOGRAM FOR MALIGNANT NEOPLASM OF BREAST: ICD-10-CM

## 2024-07-25 PROCEDURE — 77067 SCR MAMMO BI INCL CAD: CPT | Mod: 26,,, | Performed by: RADIOLOGY

## 2024-07-25 PROCEDURE — 77063 BREAST TOMOSYNTHESIS BI: CPT | Mod: 26,,, | Performed by: RADIOLOGY

## 2024-07-25 PROCEDURE — 77067 SCR MAMMO BI INCL CAD: CPT | Mod: TC,PO

## 2024-07-25 PROCEDURE — 77080 DXA BONE DENSITY AXIAL: CPT | Mod: TC,PO

## 2024-07-26 ENCOUNTER — PATIENT MESSAGE (OUTPATIENT)
Dept: FAMILY MEDICINE | Facility: CLINIC | Age: 69
End: 2024-07-26
Payer: MEDICARE

## 2024-07-26 DIAGNOSIS — R19.5 POSITIVE COLORECTAL CANCER SCREENING USING COLOGUARD TEST: Primary | ICD-10-CM

## 2024-07-27 ENCOUNTER — TELEPHONE (OUTPATIENT)
Dept: ENDOSCOPY | Facility: HOSPITAL | Age: 69
End: 2024-07-27
Payer: MEDICARE

## 2024-07-27 NOTE — TELEPHONE ENCOUNTER
Called pt to schedule urgent colonoscopy procedure.  Pt did not answer. A message was left on pts voicemail requesting call back to schedule urgent procedure.

## 2024-07-29 ENCOUNTER — OFFICE VISIT (OUTPATIENT)
Dept: FAMILY MEDICINE | Facility: CLINIC | Age: 69
End: 2024-07-29
Payer: MEDICARE

## 2024-07-29 VITALS
HEART RATE: 93 BPM | DIASTOLIC BLOOD PRESSURE: 64 MMHG | OXYGEN SATURATION: 96 % | TEMPERATURE: 98 F | BODY MASS INDEX: 18.46 KG/M2 | SYSTOLIC BLOOD PRESSURE: 114 MMHG | WEIGHT: 128.63 LBS

## 2024-07-29 DIAGNOSIS — J44.9 CHRONIC OBSTRUCTIVE PULMONARY DISEASE, UNSPECIFIED COPD TYPE: ICD-10-CM

## 2024-07-29 DIAGNOSIS — R19.5 POSITIVE COLORECTAL CANCER SCREENING USING COLOGUARD TEST: ICD-10-CM

## 2024-07-29 DIAGNOSIS — R91.1 PULMONARY NODULE 1 CM OR GREATER IN DIAMETER: ICD-10-CM

## 2024-07-29 DIAGNOSIS — F17.210 TOBACCO DEPENDENCE DUE TO CIGARETTES: ICD-10-CM

## 2024-07-29 DIAGNOSIS — Z71.2 ENCOUNTER TO DISCUSS TEST RESULTS: Primary | ICD-10-CM

## 2024-07-29 PROCEDURE — 99214 OFFICE O/P EST MOD 30 MIN: CPT | Mod: S$GLB,,, | Performed by: FAMILY MEDICINE

## 2024-07-29 PROCEDURE — 1101F PT FALLS ASSESS-DOCD LE1/YR: CPT | Mod: CPTII,S$GLB,, | Performed by: FAMILY MEDICINE

## 2024-07-29 PROCEDURE — 1126F AMNT PAIN NOTED NONE PRSNT: CPT | Mod: CPTII,S$GLB,, | Performed by: FAMILY MEDICINE

## 2024-07-29 PROCEDURE — 1159F MED LIST DOCD IN RCRD: CPT | Mod: CPTII,S$GLB,, | Performed by: FAMILY MEDICINE

## 2024-07-29 PROCEDURE — 3074F SYST BP LT 130 MM HG: CPT | Mod: CPTII,S$GLB,, | Performed by: FAMILY MEDICINE

## 2024-07-29 PROCEDURE — 3078F DIAST BP <80 MM HG: CPT | Mod: CPTII,S$GLB,, | Performed by: FAMILY MEDICINE

## 2024-07-29 PROCEDURE — 1160F RVW MEDS BY RX/DR IN RCRD: CPT | Mod: CPTII,S$GLB,, | Performed by: FAMILY MEDICINE

## 2024-07-29 PROCEDURE — 3044F HG A1C LEVEL LT 7.0%: CPT | Mod: CPTII,S$GLB,, | Performed by: FAMILY MEDICINE

## 2024-07-29 PROCEDURE — 3008F BODY MASS INDEX DOCD: CPT | Mod: CPTII,S$GLB,, | Performed by: FAMILY MEDICINE

## 2024-07-29 PROCEDURE — 3288F FALL RISK ASSESSMENT DOCD: CPT | Mod: CPTII,S$GLB,, | Performed by: FAMILY MEDICINE

## 2024-07-29 PROCEDURE — 99999 PR PBB SHADOW E&M-EST. PATIENT-LVL III: CPT | Mod: PBBFAC,,, | Performed by: FAMILY MEDICINE

## 2024-07-29 NOTE — PROGRESS NOTES
Assessment & Plan:    Encounter to discuss test results  Fasting labs and mammogram were normal. Repeat in 1 year.     Chronic obstructive pulmonary disease, unspecified COPD type  PFT showing mild obstructive disease. She does not have daily respiratory symptoms. Continue albuterol prn. Follow up if respiratory symptoms worsen or become more frequent.    Pulmonary nodule 1 cm or greater in diameter  RUL 1 cm nodule found on LDCT. Repeat CT scan in 6 mo - number provided to schedule CT.    Positive colorectal cancer screening using Cologuard test  Reviewed result. Patient to call and schedule diagnostic colonoscopy.    Tobacco dependence due to cigarettes  Patient is aware of the need to quit smoking.    DEXA performed but result is pending - message sent to staff to inquire about result.     Follow-up: Follow up in about 1 year (around 7/29/2025).  ______________________________________________________________________    Chief Complaint  Chief Complaint   Patient presents with    Health Maintenance     2-3 week follow up       HPI  Renata Garcia is a 68 y.o. female with medical diagnoses as listed in the medical history and problem list that presents to the office to review her most recent labs, PFT, CT chest, DEXA, and mammogram. She was last seen in the office on 7/11 to establish care. She is in her usual state of health today. DEXA was done but result is pending.     Most recent pertinent workup:     Last CBC Results:   Lab Results   Component Value Date    WBC 7.88 07/13/2024    HGB 15.6 07/13/2024    HCT 48.2 07/13/2024     07/13/2024       Last CMP Results  Lab Results   Component Value Date     07/13/2024    K 4.4 07/13/2024     07/13/2024    CO2 27 07/13/2024    BUN 13 07/13/2024    CREATININE 0.8 07/13/2024    CALCIUM 9.7 07/13/2024    ALBUMIN 4.0 07/13/2024    AST 16 07/13/2024    ALT 11 07/13/2024       Last Lipids  Lab Results   Component Value Date    CHOL 193 07/13/2024    TRIG  127 07/13/2024    HDL 54 07/13/2024    LDLCALC 113.6 07/13/2024       Last A1C  Lab Results   Component Value Date    HGBA1C 5.2 07/13/2024         Health Maintenance         Date Due Completion Date    Pneumococcal Vaccines (Age 65+) (1 of 2 - PCV) Never done ---    TETANUS VACCINE Never done ---    DEXA Scan Never done ---    Shingles Vaccine (1 of 2) Never done ---    RSV Vaccine (Age 60+ and Pregnant patients) (1 - 1-dose 60+ series) Never done ---    COVID-19 Vaccine (3 - 2023-24 season) 09/01/2023 10/30/2021    Influenza Vaccine (1) 09/01/2024 ---    Mammogram 07/25/2025 7/25/2024    Colorectal Cancer Screening 07/22/2027 7/22/2024    Lipid Panel 07/13/2029 7/13/2024              PAST MEDICAL HISTORY:  History reviewed. No pertinent past medical history.    PAST SURGICAL HISTORY:  History reviewed. No pertinent surgical history.    SOCIAL HISTORY:  Social History     Socioeconomic History    Marital status:    Tobacco Use    Smoking status: Every Day     Current packs/day: 1.00     Types: Cigarettes    Smokeless tobacco: Never   Substance and Sexual Activity    Alcohol use: Yes     Comment: occ    Drug use: Never    Sexual activity: Not Currently     Social Determinants of Health     Financial Resource Strain: Patient Declined (7/28/2024)    Overall Financial Resource Strain (CARDIA)     Difficulty of Paying Living Expenses: Patient declined   Food Insecurity: No Food Insecurity (7/28/2024)    Hunger Vital Sign     Worried About Running Out of Food in the Last Year: Never true     Ran Out of Food in the Last Year: Never true   Stress: Stress Concern Present (7/28/2024)    [facility] - Occupational Stress Questionnaire     Feeling of Stress : Rather much   Housing Stability: Unknown (7/28/2024)    Housing Stability Vital Sign     Unable to Pay for Housing in the Last Year: No       FAMILY HISTORY:  Family History   Problem Relation Name Age of Onset    Cancer Sister       Hepatitis Sister      Cancer Maternal Uncle         ALLERGIES AND MEDICATIONS: updated and reviewed.  Review of patient's allergies indicates:   Allergen Reactions    Codeine Nausea And Vomiting     Current Outpatient Medications   Medication Sig Dispense Refill    albuterol (PROVENTIL/VENTOLIN HFA) 90 mcg/actuation inhaler Inhale 1-2 puffs into the lungs every 4 to 6 hours as needed for Wheezing or Shortness of Breath (chest tightness). (Patient not taking: Reported on 7/29/2024) 18 g 11     No current facility-administered medications for this visit.         ROS  Review of Systems   Constitutional:  Negative for activity change.   Respiratory:  Positive for shortness of breath (discussed at previous visit).            Physical Exam  Vitals:    07/29/24 1005   BP: 114/64   Pulse: 93   Temp: 98.1 °F (36.7 °C)   TempSrc: Oral   SpO2: 96%   Weight: 58.3 kg (128 lb 10.2 oz)    Body mass index is 18.46 kg/m².  Weight: 58.3 kg (128 lb 10.2 oz)       Physical Exam  Constitutional:       General: She is not in acute distress.  HENT:      Head: Normocephalic and atraumatic.   Skin:     General: Skin is warm and dry.   Neurological:      Mental Status: She is alert. Mental status is at baseline.   Psychiatric:         Mood and Affect: Mood normal.         Behavior: Behavior normal.

## 2024-07-29 NOTE — Clinical Note
DEXA done on 7/11 - can we call and ask for update on reading, as this has not been done yet? Thanks.

## 2024-07-30 DIAGNOSIS — Z00.00 ENCOUNTER FOR MEDICARE ANNUAL WELLNESS EXAM: ICD-10-CM

## 2024-08-07 ENCOUNTER — PATIENT MESSAGE (OUTPATIENT)
Dept: FAMILY MEDICINE | Facility: CLINIC | Age: 69
End: 2024-08-07
Payer: MEDICARE

## 2024-08-12 ENCOUNTER — TELEPHONE (OUTPATIENT)
Dept: ENDOSCOPY | Facility: HOSPITAL | Age: 69
End: 2024-08-12
Payer: MEDICARE

## 2024-08-20 ENCOUNTER — OFFICE VISIT (OUTPATIENT)
Dept: FAMILY MEDICINE | Facility: CLINIC | Age: 69
End: 2024-08-20
Payer: MEDICARE

## 2024-08-20 DIAGNOSIS — M81.6 LOCALIZED OSTEOPOROSIS WITHOUT CURRENT PATHOLOGICAL FRACTURE: Primary | ICD-10-CM

## 2024-08-20 PROCEDURE — 99213 OFFICE O/P EST LOW 20 MIN: CPT | Mod: HCNC,95,, | Performed by: FAMILY MEDICINE

## 2024-08-20 PROCEDURE — 1159F MED LIST DOCD IN RCRD: CPT | Mod: HCNC,CPTII,95, | Performed by: FAMILY MEDICINE

## 2024-08-20 PROCEDURE — 3044F HG A1C LEVEL LT 7.0%: CPT | Mod: HCNC,CPTII,95, | Performed by: FAMILY MEDICINE

## 2024-08-20 PROCEDURE — 1160F RVW MEDS BY RX/DR IN RCRD: CPT | Mod: HCNC,CPTII,95, | Performed by: FAMILY MEDICINE

## 2024-08-20 RX ORDER — ERGOCALCIFEROL 1.25 MG/1
50000 CAPSULE ORAL
Qty: 12 CAPSULE | Refills: 3 | Status: SHIPPED | OUTPATIENT
Start: 2024-08-20

## 2024-08-20 NOTE — PROGRESS NOTES
Assessment & Plan:    Localized osteoporosis without current pathological fracture  -     ergocalciferol (ERGOCALCIFEROL) 50,000 unit Cap; Take 1 capsule (50,000 Units total) by mouth every Sunday.  Dispense: 12 capsule; Refill: 3    Discussed DEXA with patient. Reviewed benefits and risks of anabolic therapy, denosumab, and oral bisphosphonates. She declines these medications. She is agreeable to vitamin D and calcium supplementation. High dose vitamin D was prescribed, recommended to start taking at least 1200 mg calcium daily, and participate in at least 150 minutes of weighted or resistance exercises per week.       The patient location is:  Patient Home   The chief complaint leading to consultation is: noted below  Visit type: Virtual visit with synchronous audio and video  Total time spent with patient: 15 minutes  Each patient to whom he or she provides medical services by telemedicine is:  (1) informed of the relationship between the physician and patient and the respective role of any other health care provider with respect to management of the patient; and (2) notified that he or she may decline to receive medical services by telemedicine and may withdraw from such care at any time.    Follow-up: Follow up if symptoms worsen or fail to improve.    ______________________________________________________________________    Chief Complaint  Chief Complaint   Patient presents with    Osteoporosis       HPI  Renata Garcia is a 68 y.o. female with multiple medical diagnoses as listed in the medical history and problem list that presents in a virtual visit upon my recommendation to discuss her most recent DEXA scan showing severe osteoporosis. Pt is known to me with her last appointment 7/29/2024. She is quite hesitant to take medication for osteoporosis because of the potential side effects.       PAST MEDICAL HISTORY:  History reviewed. No pertinent past medical history.    PAST SURGICAL HISTORY:  History  reviewed. No pertinent surgical history.    SOCIAL HISTORY:  Social History     Socioeconomic History    Marital status:    Tobacco Use    Smoking status: Every Day     Current packs/day: 1.00     Types: Cigarettes    Smokeless tobacco: Never   Substance and Sexual Activity    Alcohol use: Yes     Comment: occ    Drug use: Never    Sexual activity: Not Currently     Social Determinants of Health     Financial Resource Strain: Patient Declined (7/28/2024)    Overall Financial Resource Strain (CARDIA)     Difficulty of Paying Living Expenses: Patient declined   Food Insecurity: No Food Insecurity (7/28/2024)    Hunger Vital Sign     Worried About Running Out of Food in the Last Year: Never true     Ran Out of Food in the Last Year: Never true   Stress: Stress Concern Present (7/28/2024)    [employer] [employer] [facility] [employer] [facility] - Occupational Stress Questionnaire     Feeling of Stress : Rather much   Housing Stability: Unknown (7/28/2024)    Housing Stability Vital Sign     Unable to Pay for Housing in the Last Year: No       FAMILY HISTORY:  Family History   Problem Relation Name Age of Onset    Cancer Sister      Hepatitis Sister      Cancer Maternal Uncle         ALLERGIES AND MEDICATIONS: updated and reviewed.  Review of patient's allergies indicates:   Allergen Reactions    Codeine Nausea And Vomiting     Current Outpatient Medications   Medication Sig Dispense Refill    albuterol (PROVENTIL/VENTOLIN HFA) 90 mcg/actuation inhaler Inhale 1-2 puffs into the lungs every 4 to 6 hours as needed for Wheezing or Shortness of Breath (chest tightness). (Patient not taking: Reported on 7/29/2024) 18 g 11    ergocalciferol (ERGOCALCIFEROL) 50,000 unit Cap Take 1 capsule (50,000 Units total) by mouth every Sunday. 12 capsule 3     No current facility-administered medications for this visit.         ROS  Review of Systems   Constitutional:  Negative for activity change and unexpected weight change.   HENT:  Negative  for hearing loss, rhinorrhea and trouble swallowing.    Eyes:  Negative for discharge and visual disturbance.   Respiratory:  Negative for chest tightness and wheezing.    Cardiovascular:  Negative for chest pain and palpitations.   Gastrointestinal:  Negative for constipation, diarrhea and vomiting.   Endocrine: Negative for polydipsia and polyuria.   Genitourinary:  Negative for difficulty urinating, dysuria, hematuria and menstrual problem.   Musculoskeletal:  Negative for arthralgias, joint swelling and neck pain.   Neurological:  Negative for weakness and headaches.   Psychiatric/Behavioral:  Negative for confusion and dysphoric mood.            Physical Exam  Physical Exam  Constitutional:       General: She is not in acute distress.  HENT:      Head: Normocephalic and atraumatic.   Neurological:      Mental Status: She is alert. Mental status is at baseline.   Psychiatric:         Mood and Affect: Mood normal.         Behavior: Behavior normal.         *Physical exam limited by virtual visit.    Health Maintenance         Date Due Completion Date    Pneumococcal Vaccines (Age 65+) (1 of 2 - PCV) Never done ---    TETANUS VACCINE Never done ---    Shingles Vaccine (1 of 2) Never done ---    RSV Vaccine (Age 60+ and Pregnant patients) (1 - 1-dose 60+ series) Never done ---    COVID-19 Vaccine (3 - 2023-24 season) 09/01/2023 10/30/2021    Colorectal Cancer Screening 07/23/2024 7/22/2024    Influenza Vaccine (1) 09/01/2024 ---    Mammogram 07/25/2025 7/25/2024    DEXA Scan 07/25/2026 7/25/2024    Lipid Panel 07/13/2029 7/13/2024

## 2024-08-21 ENCOUNTER — TELEPHONE (OUTPATIENT)
Dept: ENDOSCOPY | Facility: HOSPITAL | Age: 69
End: 2024-08-21
Payer: MEDICARE

## 2024-08-21 DIAGNOSIS — R19.5 POSITIVE COLORECTAL CANCER SCREENING USING COLOGUARD TEST: ICD-10-CM

## 2024-08-21 DIAGNOSIS — Z12.11 SPECIAL SCREENING FOR MALIGNANT NEOPLASMS, COLON: Primary | ICD-10-CM

## 2024-08-21 NOTE — TELEPHONE ENCOUNTER
Spoke to patient to schedule procedure(s) Colonoscopy       Physician to perform procedure(s) Dr. LATRICE Perdmoo  Date of Procedure (s) 9/17/24  Arrival Time 10:00 AM  Time of Procedure(s) 11:00 AM   Location of Procedure(s) [address]   Type of Rx Prep sent to patient: PEG  Instructions provided to patient via [facility]    Patient was informed on the following information and verbalized understanding. Screening questionnaire reviewed with patient and complete. If procedure requires anesthesia, a responsible adult needs to be present to accompany the patient home, patient cannot drive after receiving anesthesia. Appointment details are tentative, especially check-in time. Patient will receive a prep-op call 7 days prior to confirm check-in time for procedure. If applicable the patient should contact their pharmacy to verify Rx for procedure prep is ready for pick-up. Patient was advised to call the scheduling department at [phone] if pharmacy states no Rx is available. Patient was advised to call the endoscopy scheduling department if any questions or concerns arise.      [facility] Scheduling Department

## 2024-09-10 ENCOUNTER — TELEPHONE (OUTPATIENT)
Dept: ENDOSCOPY | Facility: HOSPITAL | Age: 69
End: 2024-09-10
Payer: MEDICARE

## 2024-09-12 ENCOUNTER — TELEPHONE (OUTPATIENT)
Dept: FAMILY MEDICINE | Facility: CLINIC | Age: 69
End: 2024-09-12
Payer: MEDICARE

## 2024-09-16 ENCOUNTER — TELEPHONE (OUTPATIENT)
Dept: ENDOSCOPY | Facility: HOSPITAL | Age: 69
End: 2024-09-16
Payer: MEDICARE

## 2024-09-16 DIAGNOSIS — Z12.11 SPECIAL SCREENING FOR MALIGNANT NEOPLASMS, COLON: Primary | ICD-10-CM

## 2024-09-16 NOTE — TELEPHONE ENCOUNTER
Spoke to patient to reschedule procedure(s) Colonoscopy       Physician to perform procedure(s) Dr. HONG Chin  Date of Procedure (s) 9/18/24  Arrival Time 8:45 AM  Time of Procedure(s) 9:45 AM   Location of Procedure(s) [address]   Type of Rx Prep sent to patient: PEG  Instructions provided to patient via [facility]    Patient was informed on the following information and verbalized understanding. Screening questionnaire reviewed with patient and complete. If procedure requires anesthesia, a responsible adult needs to be present to accompany the patient home, patient cannot drive after receiving anesthesia. Appointment details are tentative, especially check-in time. Patient will receive a prep-op call 7 days prior to confirm check-in time for procedure. If applicable the patient should contact their pharmacy to verify Rx for procedure prep is ready for pick-up. Patient was advised to call the scheduling department at [phone] if pharmacy states no Rx is available. Patient was advised to call the endoscopy scheduling department if any questions or concerns arise.      [facility] Scheduling Department

## 2024-09-17 ENCOUNTER — ANESTHESIA EVENT (OUTPATIENT)
Dept: ENDOSCOPY | Facility: HOSPITAL | Age: 69
End: 2024-09-17
Payer: MEDICARE

## 2024-09-17 RX ORDER — LIDOCAINE HYDROCHLORIDE 10 MG/ML
1 INJECTION, SOLUTION EPIDURAL; INFILTRATION; INTRACAUDAL; PERINEURAL ONCE
OUTPATIENT
Start: 2024-09-17 | End: 2024-09-17

## 2024-09-18 ENCOUNTER — ANESTHESIA (OUTPATIENT)
Dept: ENDOSCOPY | Facility: HOSPITAL | Age: 69
End: 2024-09-18
Payer: MEDICARE

## 2024-09-18 ENCOUNTER — NURSE TRIAGE (OUTPATIENT)
Dept: ADMINISTRATIVE | Facility: CLINIC | Age: 69
End: 2024-09-18
Payer: MEDICARE

## 2024-09-18 ENCOUNTER — HOSPITAL ENCOUNTER (OUTPATIENT)
Facility: HOSPITAL | Age: 69
Discharge: HOME | End: 2024-09-18
Attending: INTERNAL MEDICINE | Admitting: INTERNAL MEDICINE
Payer: MEDICARE

## 2024-09-18 VITALS
SYSTOLIC BLOOD PRESSURE: 143 MMHG | HEART RATE: 62 BPM | TEMPERATURE: 98 F | DIASTOLIC BLOOD PRESSURE: 64 MMHG | OXYGEN SATURATION: 100 % | RESPIRATION RATE: 18 BRPM

## 2024-09-18 DIAGNOSIS — R19.5 POSITIVE COLORECTAL CANCER SCREENING USING COLOGUARD TEST: ICD-10-CM

## 2024-09-18 PROCEDURE — 37000009 HC ANESTHESIA EA ADD 15 MINS: Mod: HCNC | Performed by: INTERNAL MEDICINE

## 2024-09-18 PROCEDURE — 45380 COLONOSCOPY AND BIOPSY: CPT | Mod: PT,KX,59,HCNC | Performed by: INTERNAL MEDICINE

## 2024-09-18 PROCEDURE — 88305 TISSUE EXAM BY PATHOLOGIST: CPT | Mod: HCNC | Performed by: PATHOLOGY

## 2024-09-18 PROCEDURE — 25000003 PHARM REV CODE 250: Mod: HCNC | Performed by: NURSE ANESTHETIST, CERTIFIED REGISTERED

## 2024-09-18 PROCEDURE — 45385 COLONOSCOPY W/LESION REMOVAL: CPT | Mod: PT,KX,HCNC | Performed by: INTERNAL MEDICINE

## 2024-09-18 PROCEDURE — 88305 TISSUE EXAM BY PATHOLOGIST: CPT | Mod: 26,HCNC,, | Performed by: PATHOLOGY

## 2024-09-18 PROCEDURE — 45385 COLONOSCOPY W/LESION REMOVAL: CPT | Mod: PT,KX,HCNC, | Performed by: INTERNAL MEDICINE

## 2024-09-18 PROCEDURE — 27200997: Mod: HCNC | Performed by: INTERNAL MEDICINE

## 2024-09-18 PROCEDURE — 27201012 HC FORCEPS, HOT/COLD, DISP: Mod: HCNC | Performed by: INTERNAL MEDICINE

## 2024-09-18 PROCEDURE — 63600175 PHARM REV CODE 636 W HCPCS: Mod: HCNC | Performed by: NURSE ANESTHETIST, CERTIFIED REGISTERED

## 2024-09-18 PROCEDURE — 27201089 HC SNARE, DISP (ANY): Mod: HCNC | Performed by: INTERNAL MEDICINE

## 2024-09-18 PROCEDURE — 37000008 HC ANESTHESIA 1ST 15 MINUTES: Mod: HCNC | Performed by: INTERNAL MEDICINE

## 2024-09-18 RX ORDER — PROPOFOL 10 MG/ML
VIAL (ML) INTRAVENOUS
Status: DISCONTINUED | OUTPATIENT
Start: 2024-09-18 | End: 2024-09-18

## 2024-09-18 RX ORDER — LIDOCAINE HYDROCHLORIDE 20 MG/ML
INJECTION INTRAVENOUS
Status: DISCONTINUED | OUTPATIENT
Start: 2024-09-18 | End: 2024-09-18

## 2024-09-18 RX ORDER — LIDOCAINE HYDROCHLORIDE 20 MG/ML
INJECTION, SOLUTION EPIDURAL; INFILTRATION; INTRACAUDAL; PERINEURAL
Status: DISCONTINUED
Start: 2024-09-18 | End: 2024-09-18 | Stop reason: HOSPADM

## 2024-09-18 RX ORDER — PROPOFOL 10 MG/ML
VIAL (ML) INTRAVENOUS
Status: DISCONTINUED
Start: 2024-09-18 | End: 2024-09-18 | Stop reason: HOSPADM

## 2024-09-18 RX ORDER — SODIUM CHLORIDE 9 MG/ML
INJECTION, SOLUTION INTRAVENOUS CONTINUOUS
Status: DISCONTINUED | OUTPATIENT
Start: 2024-09-18 | End: 2024-09-18 | Stop reason: HOSPADM

## 2024-09-18 RX ADMIN — SODIUM CHLORIDE: 0.9 INJECTION, SOLUTION INTRAVENOUS at 08:09

## 2024-09-18 RX ADMIN — PROPOFOL 20 MG: 10 INJECTION, EMULSION INTRAVENOUS at 09:09

## 2024-09-18 RX ADMIN — PROPOFOL 40 MG: 10 INJECTION, EMULSION INTRAVENOUS at 09:09

## 2024-09-18 RX ADMIN — LIDOCAINE HYDROCHLORIDE 100 MG: 20 INJECTION, SOLUTION INTRAVENOUS at 09:09

## 2024-09-18 RX ADMIN — PROPOFOL 60 MG: 10 INJECTION, EMULSION INTRAVENOUS at 09:09

## 2024-09-18 RX ADMIN — PROPOFOL 80 MG: 10 INJECTION, EMULSION INTRAVENOUS at 09:09

## 2024-09-18 NOTE — ANESTHESIA POSTPROCEDURE EVALUATION
Anesthesia Post Evaluation    Patient: Renata Garcai    Procedure(s) Performed: Procedure(s) (LRB):  COLONOSCOPY (N/A)    Final Anesthesia Type: general      Patient location during evaluation: GI PACU  Patient participation: Yes- Able to Participate  Level of consciousness: awake and alert  Post-procedure vital signs: reviewed and stable  Airway patency: patent    PONV status at discharge: No PONV  Anesthetic complications: no      Cardiovascular status: blood pressure returned to baseline and hemodynamically stable  Respiratory status: unassisted, spontaneous ventilation and room air  Hydration status: euvolemic  Follow-up not needed.              Vitals Value Taken Time   /57 09/18/24 1007   Temp 36.4 °C (97.5 °F) 09/18/24 0952   Pulse 65 09/18/24 1007   Resp 18 09/18/24 1007   SpO2 100 % 09/18/24 1007         No case tracking events are documented in the log.      Pain/Fely Score: Fely Score: 10 (9/18/2024 10:07 AM)

## 2024-09-18 NOTE — ANESTHESIA PREPROCEDURE EVALUATION
09/18/2024  Renata Garcia is a 68 y.o., female.      Pre-op Assessment    I have reviewed the Patient Summary Reports.     I have reviewed the Nursing Notes. I have reviewed the NPO Status.   I have reviewed the Medications.     Review of Systems  Anesthesia Hx:  No problems with previous Anesthesia             Denies Family Hx of Anesthesia complications.    Denies Personal Hx of Anesthesia complications.                    Social:  Smoker, Social Alcohol Use       Hematology/Oncology:  Hematology Normal   Oncology Normal                                   EENT/Dental:  EENT/Dental Normal           Cardiovascular:  Cardiovascular Normal                                            Pulmonary:  Pulmonary Normal                       Renal/:  Renal/ Normal                 Hepatic/GI:  Hepatic/GI Normal                 Musculoskeletal:  Musculoskeletal Normal                Neurological:  Neurology Normal                                      Endocrine:  Endocrine Normal            Psych:  Psychiatric Normal                    Physical Exam  General: Cooperative, Oriented and Alert    Airway:  Mallampati: II   Mouth Opening: Normal  TM Distance: Normal  Tongue: Normal  Neck ROM: Normal ROM    Dental:  Intact        Anesthesia Plan  Type of Anesthesia, risks & benefits discussed:    Anesthesia Type: Gen Natural Airway  Intra-op Monitoring Plan: Standard ASA Monitors  Induction:  IV  Informed Consent: Patient consented to blood products? No  ASA Score: 2    Ready For Surgery From Anesthesia Perspective.     .

## 2024-09-18 NOTE — PROVATION PATIENT INSTRUCTIONS
Discharge Summary/Instructions after an Endoscopic Procedure  Patient Name: Renata Garcia  Patient MRN: [id number]  Patient YOB: 1955 Wednesday, September 18, 2024  Benoit Chin MD  Dear patient,  As a result of recent federal legislation (The Federal Cures Act), you may   receive lab or pathology results from your procedure in your [facility]   account before your physician is able to contact you. Your physician or   their representative will relay the results to you with their   recommendations at their soonest availability.  Thank you,  RESTRICTIONS:  During your procedure today, you received medications for sedation.  These   medications may affect your judgment, balance and coordination.  Therefore,   for 24 hours, you have the following restrictions:   - DO NOT drive a car, operate machinery, make legal/financial decisions,   sign important papers or drink alcohol.    ACTIVITY:  Today: no heavy lifting, straining or running due to procedural   sedation/anesthesia.  The following day: return to full activity including work.  DIET:  Eat and drink normally unless instructed otherwise.     TREATMENT FOR COMMON SIDE EFFECTS:  - Mild abdominal pain, nausea, belching, bloating or excessive gas:  rest,   eat lightly and use a heating pad.  - Sore Throat: treat with throat lozenges and/or gargle with warm salt   water.  - Because air was used during the procedure, expelling large amounts of air   from your rectum or belching is normal.  - If a bowel prep was taken, you may not have a bowel movement for 1-3 days.    This is normal.  SYMPTOMS TO WATCH FOR AND REPORT TO YOUR PHYSICIAN:  1. Abdominal pain or bloating, other than gas cramps.  2. Chest pain.  3. Back pain.  4. Signs of infection such as: chills or fever occurring within 24 hours   after the procedure.  5. Rectal bleeding, which would show as bright red, maroon, or black stools.   (A tablespoon of blood from the rectum is not serious, especially  if   hemorrhoids are present.)  6. Vomiting.  7. Weakness or dizziness.  GO DIRECTLY TO THE NEAREST EMERGENCY ROOM IF YOU HAVE ANY OF THE FOLLOWING:      Difficulty breathing              Chills and/or fever over 101 F   Persistent vomiting and/or vomiting blood   Severe abdominal pain   Severe chest pain   Black, tarry stools   Bleeding- more than one tablespoon   Any other symptom or condition that you feel may need urgent attention  Your doctor recommends these additional instructions:  If any biopsies were taken, your doctors clinic will contact you in 1 to 2   weeks with any results.  - Patient has a contact number available for emergencies.  The signs and   symptoms of potential delayed complications were discussed with the   patient.  Return to normal activities tomorrow.  Written discharge   instructions were provided to the patient.   - Discharge patient to home (ambulatory).   - Resume previous diet.   - Continue present medications.   - Await pathology results.   - Repeat colonoscopy in 3 years for surveillance.   - Return to primary care physician as previously scheduled.  For questions, problems or results please call your physician - Benoit Chin MD at Work:  [phone].  [facility] can be reached at [phone]     IF A COMPLICATION OR EMERGENCY SITUATION ARISES AND YOU ARE UNABLE TO REACH   YOUR PHYSICIAN - GO DIRECTLY TO THE EMERGENCY ROOM.  Benoit Chin MD  9/18/2024 9:54:11 AM  This report has been verified and signed electronically.  Dear patient,  As a result of recent federal legislation (The Federal Cures Act), you may   receive lab or pathology results from your procedure in your [facility]   account before your physician is able to contact you. Your physician or   their representative will relay the results to you with their   recommendations at their soonest availability.  Thank you,  PROVATION

## 2024-09-18 NOTE — TRANSFER OF CARE
Anesthesia Transfer of Care Note    Patient: Renata Garcia    Procedure(s) Performed: Procedure(s) (LRB):  COLONOSCOPY (N/A)    Patient location: GI    Anesthesia Type: general    Transport from OR: Transported from OR on room air with adequate spontaneous ventilation    Post pain: adequate analgesia    Post assessment: no apparent anesthetic complications and tolerated procedure well    Post vital signs: stable    Level of consciousness: sedated    Nausea/Vomiting: no nausea/vomiting    Complications: none    Transfer of care protocol was followed      Last vitals: Visit Vitals  BP (!) 89/54 (BP Location: Left arm)   Pulse 64   Temp 36.4 °C (97.5 °F) (Oral)   Resp 18   LMP  (LMP Unknown)   SpO2 95%   Breastfeeding No

## 2024-09-18 NOTE — PLAN OF CARE
Procedure and Recovery complete. Patient alert and oriented, no pain reported, and no signs or symptoms of distress noted at this moment. Discharge instructions discussed and given to patient.

## 2024-09-18 NOTE — H&P
Short Stay Endoscopy History and Physical    PCP - Holly Cotto, DO    Procedure - Colonoscopy  ASA - per anesthesia  Mallampati - per anesthesia  History of Anesthesia problems - no  Family history Anesthesia problems -  no   Plan of anesthesia - General    HPI:  This is a 68 y.o. female here for evaluation of : positive cologuard.     ROS:  Constitutional: No fevers, chills  CV: No chest pain  Pulm: No shortness of breath  GI: see HPI  Derm: No rash    Medical History:  has no past medical history on file.    Surgical History:  has no past surgical history on file.    Family History: family history includes Cancer in her maternal uncle and sister; Hepatitis in her sister.. Otherwise no colon cancer, inflammatory bowel disease, or GI malignancies.    Social History:  reports that she has been smoking cigarettes. She has never used smokeless tobacco. She reports current alcohol use. She reports that she does not use drugs.    Review of patient's allergies indicates:   Allergen Reactions    Codeine Nausea And Vomiting       Medications:   Medications Prior to Admission   Medication Sig Dispense Refill Last Dose    albuterol (PROVENTIL/VENTOLIN HFA) 90 mcg/actuation inhaler Inhale 1-2 puffs into the lungs every 4 to 6 hours as needed for Wheezing or Shortness of Breath (chest tightness). (Patient not taking: Reported on 7/29/2024) 18 g 11     ergocalciferol (ERGOCALCIFEROL) 50,000 unit Cap Take 1 capsule (50,000 Units total) by mouth every Sunday. 12 capsule 3          Physical Exam:    Vital Signs: There were no vitals filed for this visit.    General Appearance: Well appearing in no acute distress  Abdomen: non distended     Labs:  Lab Results   Component Value Date    WBC 7.88 07/13/2024    HGB 15.6 07/13/2024    HCT 48.2 07/13/2024     07/13/2024    CHOL 193 07/13/2024    TRIG 127 07/13/2024    HDL 54 07/13/2024    ALT 11 07/13/2024    AST 16 07/13/2024     07/13/2024    K 4.4 07/13/2024      07/13/2024    CREATININE 0.8 07/13/2024    BUN 13 07/13/2024    CO2 27 07/13/2024    TSH 1.370 04/15/2015    HGBA1C 5.2 07/13/2024       I have explained the risks and benefits of endoscopy procedures to the patient including but not limited to bleeding, perforation, infection, and death.  The patient was asked if they understand and allowed to ask any further questions to their satisfaction.    Gunjan Thomas MD

## 2024-09-18 NOTE — TELEPHONE ENCOUNTER
Colonoscopy, lot of blood. Seems like 2 cups, was told she should not bleed more than a tablespoon. Triage done- ED advised. Patient would rather wait, advised I will send message,but care advice is ED. States she will go if she does not hear from them.   Reason for Disposition   SEVERE rectal bleeding (large blood clots; on and off, or constant bleeding)    Additional Information   Negative: Shock suspected (e.g., cold/pale/clammy skin, too weak to stand, low BP, rapid pulse)   Negative: Difficult to awaken or acting confused (e.g., disoriented, slurred speech)   Negative: Passed out (i.e., lost consciousness, collapsed and was not responding)   Negative: Sounds like a life-threatening emergency to the triager   Negative: SEVERE abdomen pain (e.g., excruciating)    Protocols used: Colonoscopy Symptoms and Rjdffojam-S-YV

## 2024-09-22 LAB
FINAL PATHOLOGIC DIAGNOSIS: NORMAL
GROSS: NORMAL
Lab: NORMAL

## 2024-09-23 ENCOUNTER — PATIENT MESSAGE (OUTPATIENT)
Dept: GASTROENTEROLOGY | Facility: HOSPITAL | Age: 69
End: 2024-09-23
Payer: MEDICARE

## 2024-10-15 ENCOUNTER — PATIENT MESSAGE (OUTPATIENT)
Dept: RESEARCH | Facility: HOSPITAL | Age: 69
End: 2024-10-15
Payer: MEDICARE

## 2024-12-12 ENCOUNTER — TELEPHONE (OUTPATIENT)
Dept: PULMONOLOGY | Facility: CLINIC | Age: 69
End: 2024-12-12
Payer: MEDICARE

## 2025-02-07 ENCOUNTER — TELEPHONE (OUTPATIENT)
Dept: PULMONOLOGY | Facility: CLINIC | Age: 70
End: 2025-02-07
Payer: MEDICARE

## 2025-02-18 ENCOUNTER — TELEPHONE (OUTPATIENT)
Dept: PULMONOLOGY | Facility: CLINIC | Age: 70
End: 2025-02-18
Payer: MEDICARE

## 2025-02-18 NOTE — TELEPHONE ENCOUNTER
Called and spoke with pt to confirm scheduling of follow up LDCT scan.  Agreeable with scheduling on March 10th, instructions given on where to go.

## 2025-02-22 DIAGNOSIS — Z00.00 ENCOUNTER FOR MEDICARE ANNUAL WELLNESS EXAM: ICD-10-CM

## 2025-03-10 ENCOUNTER — HOSPITAL ENCOUNTER (OUTPATIENT)
Dept: RADIOLOGY | Facility: HOSPITAL | Age: 70
Discharge: HOME | End: 2025-03-10
Attending: FAMILY MEDICINE
Payer: MEDICARE

## 2025-03-10 ENCOUNTER — RESULTS FOLLOW-UP (OUTPATIENT)
Dept: FAMILY MEDICINE | Facility: CLINIC | Age: 70
End: 2025-03-10

## 2025-03-10 ENCOUNTER — TELEPHONE (OUTPATIENT)
Dept: FAMILY MEDICINE | Facility: CLINIC | Age: 70
End: 2025-03-10
Payer: MEDICARE

## 2025-03-10 DIAGNOSIS — R91.1 PULMONARY NODULE: Primary | ICD-10-CM

## 2025-03-10 DIAGNOSIS — R91.1 PULMONARY NODULE 1 CM OR GREATER IN DIAMETER: ICD-10-CM

## 2025-03-10 PROCEDURE — 71250 CT THORAX DX C-: CPT | Mod: TC,HCNC

## 2025-03-10 PROCEDURE — 71250 CT THORAX DX C-: CPT | Mod: 26,HCNC,, | Performed by: RADIOLOGY

## 2025-03-10 NOTE — TELEPHONE ENCOUNTER
Called patient to inform her of her CT chest showing interval enlargement of the left lower pulmonary nodule and that she would need further evaluation with Pulmonology. Patient expressed understanding and agreed to referral.

## 2025-03-14 ENCOUNTER — TELEPHONE (OUTPATIENT)
Dept: FAMILY MEDICINE | Facility: CLINIC | Age: 70
End: 2025-03-14
Payer: MEDICARE

## 2025-03-14 DIAGNOSIS — R91.1 PULMONARY NODULE: Primary | ICD-10-CM

## 2025-03-14 NOTE — TELEPHONE ENCOUNTER
----- Message from Demetri sent at 3/14/2025 10:10 AM CDT -----  Regarding: Renata  Type: Patient Callback Who called: Renata What is the request in detail: Pt stated that she would like to speak with the doctor about her appointment with the specialist she was referred to because the appointment she was given is not until July. Please reach out to the patient as soon as possible. Can the clinic reply by [facility]? Yes Would the patient rather a call back or a response via [facility]? Callback Best call back number: [phone] Information:

## 2025-03-14 NOTE — TELEPHONE ENCOUNTER
Patient requesting to speak with provider regarding pulmonology appointment . Patient advised to take the first available appointment and request subsequent follow up appointment at preferred location. Patient voiced understanding.

## 2025-03-14 NOTE — TELEPHONE ENCOUNTER
----- Message from Nurse Hernandez sent at 3/14/2025 10:43 AM CDT -----  Patient appointment with pulmonology is not until July. Patient wants to know if she is ok to wait this long to be evaluated due to rapid growth of spot on lung.  ----- Message -----  From: Jennifer Trujillo MA  Sent: 3/14/2025  10:41 AM CDT  To: Yadiel Barrera Staff    Type: Patient Call BackWho called: selfWhat is the request in detail: pt. States she has a spot on her lung and there are no appts available until July.. she states she checked several other locations ad nothing is available .. Can the clinic reply by [facility]?NoWould the patient rather a call back or a response via [facility]? Yes, call Best call back number: [phone] (home)

## 2025-04-24 ENCOUNTER — OFFICE VISIT (OUTPATIENT)
Dept: PULMONOLOGY | Facility: CLINIC | Age: 70
End: 2025-04-24
Payer: MEDICARE

## 2025-04-24 ENCOUNTER — LAB VISIT (OUTPATIENT)
Dept: LAB | Facility: HOSPITAL | Age: 70
End: 2025-04-24
Attending: INTERNAL MEDICINE
Payer: MEDICARE

## 2025-04-24 VITALS
BODY MASS INDEX: 17.81 KG/M2 | SYSTOLIC BLOOD PRESSURE: 127 MMHG | HEART RATE: 78 BPM | DIASTOLIC BLOOD PRESSURE: 77 MMHG | OXYGEN SATURATION: 96 % | HEIGHT: 70 IN | WEIGHT: 124.44 LBS

## 2025-04-24 DIAGNOSIS — R91.1 PULMONARY NODULE: ICD-10-CM

## 2025-04-24 DIAGNOSIS — J44.9 CHRONIC OBSTRUCTIVE PULMONARY DISEASE, UNSPECIFIED COPD TYPE: Primary | ICD-10-CM

## 2025-04-24 DIAGNOSIS — Z72.0 TOBACCO ABUSE: ICD-10-CM

## 2025-04-24 DIAGNOSIS — R91.8 PULMONARY NODULES: ICD-10-CM

## 2025-04-24 PROCEDURE — 3008F BODY MASS INDEX DOCD: CPT | Mod: HCNC,CPTII,S$GLB, | Performed by: INTERNAL MEDICINE

## 2025-04-24 PROCEDURE — 1101F PT FALLS ASSESS-DOCD LE1/YR: CPT | Mod: HCNC,CPTII,S$GLB, | Performed by: INTERNAL MEDICINE

## 2025-04-24 PROCEDURE — 99999 PR PBB SHADOW E&M-EST. PATIENT-LVL III: CPT | Mod: PBBFAC,HCNC,, | Performed by: INTERNAL MEDICINE

## 2025-04-24 PROCEDURE — 3078F DIAST BP <80 MM HG: CPT | Mod: HCNC,CPTII,S$GLB, | Performed by: INTERNAL MEDICINE

## 2025-04-24 PROCEDURE — 1126F AMNT PAIN NOTED NONE PRSNT: CPT | Mod: HCNC,CPTII,S$GLB, | Performed by: INTERNAL MEDICINE

## 2025-04-24 PROCEDURE — 3074F SYST BP LT 130 MM HG: CPT | Mod: HCNC,CPTII,S$GLB, | Performed by: INTERNAL MEDICINE

## 2025-04-24 PROCEDURE — 3288F FALL RISK ASSESSMENT DOCD: CPT | Mod: HCNC,CPTII,S$GLB, | Performed by: INTERNAL MEDICINE

## 2025-04-24 PROCEDURE — 86480 TB TEST CELL IMMUN MEASURE: CPT | Mod: HCNC

## 2025-04-24 PROCEDURE — 99204 OFFICE O/P NEW MOD 45 MIN: CPT | Mod: HCNC,S$GLB,, | Performed by: INTERNAL MEDICINE

## 2025-04-24 PROCEDURE — 36415 COLL VENOUS BLD VENIPUNCTURE: CPT | Mod: HCNC

## 2025-04-24 PROCEDURE — 1159F MED LIST DOCD IN RCRD: CPT | Mod: HCNC,CPTII,S$GLB, | Performed by: INTERNAL MEDICINE

## 2025-04-24 NOTE — PROGRESS NOTES
Renata Garcia  was seen as a new patient at the request  Holly Cotto DO for the evaluation of  pulmonary nodules.    CHIEF COMPLAINT:  Pulmonary Nodules      HISTORY OF PRESENT ILLNESS: Renata Garcia is a 69 y.o. female  has no past medical history on file.  Patient was noted to have 1 cm rul nodule on 7/15/24.  Repeat ct 3/10/25 noted new lll at 1 cm and enlarging lll ggo.  Patient was referred to pulmonary for further inputs.    Patient with intermittent cough.  No fever/chills.  No hemoptysis.  No weight loss.  Active caring for grandchildren.  No chest pain. No orthopnea.  No pnd.  No h/o prior pneumonia    Additional Pulmonary History:   Occupational/Environmental Exposures:  retire  after 45 years  Exposure to Animals/Pets:  dogs and cats   Foreign Travel History:  nonce since 1979  History of exposures to TB:  denied   Family History of Lung Cancer:  denied   Tobacco:  3/4 ppd; used to smoke 1 ppd since teenage years  Childhood history of Lung Disease:  denied  Chest surgery or trauma:  denied      PAST MEDICAL HISTORY:    Active Ambulatory Problems     Diagnosis Date Noted    Palpitations 05/05/2015    Abnormal EKG 05/05/2015    Chest pain, atypical 05/05/2015    Chronic obstructive pulmonary disease, unspecified COPD type 04/24/2025    Pulmonary nodules 04/24/2025    Tobacco abuse 04/24/2025     Resolved Ambulatory Problems     Diagnosis Date Noted    No Resolved Ambulatory Problems     No Additional Past Medical History                PAST SURGICAL HISTORY:    Past Surgical History:   Procedure Laterality Date    COLONOSCOPY N/A 9/18/2024    Procedure: COLONOSCOPY;  Surgeon: Benoit Chin MD;  Location: [facility];  Service: Endoscopy;  Laterality: N/A;  8/21 ref  by LEANDRO HERNANDEZ, JANY, instr. to portal-st  9/10/24- lvm/portal for pc. DBM  9/16 pt r/s, PEG, portal.efren         FAMILY HISTORY:                Family History   Problem Relation Name Age of Onset    Cancer Sister       "Hepatitis Sister      Cancer Maternal Uncle         SOCIAL HISTORY:          Tobacco: Tobacco Use History[1]  alcohol use:    Social History     Substance and Sexual Activity   Alcohol Use Yes    Comment: occ                   ALLERGIES:    Review of patient's allergies indicates:   Allergen Reactions    Codeine Nausea And Vomiting       CURRENT MEDICATIONS:  Current Medications[2]               REVIEW OF SYSTEMS:     Pulmonary related symptoms as per HPI.  Gen:  no weight loss, no fever, no night sweat  HEENT:  no visual changes, no sore throat, no hearing loss  CV:  per hpi  GI:  no melena, no hematochezia, no diarhea, no constipation.  :  no dysuria, no hematuria, no hesistancy, no dribbling  Neuro:  no syncope, no vertigo, no tinitus  Psych:  No homocide or suicide ideation; no depression.  Endocrine:  No heat or cold intolerance.  Sleep:  + snoring; no witnessed apnea.  Rested upon awake.    Otherwise, a balance of systems reviewed is negative.          PHYSICAL EXAM:  Vitals:    04/24/25 0929   BP: 127/77   Pulse: 78   SpO2: 96%   Weight: 56.4 kg (124 lb 7.2 oz)   Height: 5' 10" (1.778 m)   PainSc: 0-No pain     Body mass index is 17.86 kg/m².     GENERAL:  well develop; no apparent distress  HEENT:  no nasal congestion; no discharge noted; class 3 modified mallampatti.   NECK:  supple; no palpable masses.  CARDIO: regular rate and rhythm  PULM:  clear to auscultation bilaterally; no intercostals retractions; no accessory muscle usage   ABDOMEN:  soft nontender/nondistended.  +bowel sound  EXTREMITIES no cce  NEURO:  CN II-XII intact.  5/5 motor in all extremities.  sensation grossly intact   to light touch.  PSYCH:  normal affect.  Alert and oriented x 4    LABS  Pulmonary Functions Testing Results(personally reviewed):    PFT 7/15/24 Ratio of 67%; FVC 2.58 L (72%); FEV1 1.73 L (63%); TLC 5.4 L (91%); dlco 16.8 (66%)   ABG (personally reviewed):  none  CXR (personally reviewed):  none  CT CHEST(personally " reviewed):  3/10/25 scattered stranding and nodular pattern in both apex (Rt>Lt).  +bullous changes in rul with bronchiectasis.  When compared to 7/15/24, there is new spiculated 1.2 cm nodule lll (4:212) and Ill defined ggo angelica (4:236)    ASSESSMENT/PLAN  Problem List Items Addressed This Visit       Chronic obstructive pulmonary disease, unspecified COPD type - Primary    Overview   bullous/emphysematous changes noted on ct scan.    Fev 63%.    Encourage smoking cessation.  Deferred inhalers.    Patient stated  has nebulizer along with albuterol and hypertonic saline.  She will used her  nebs.           Pulmonary nodules    Overview   scattered nodular infiltrated noted bilaterally.  Repeat ct 3/2025 with enlarging nodule in lll and ggo in angelica.  Ddx: infection vs malignancy.  Pet scan to help with decision of ct guided biopsy vs serial monitoring.  Sputum afb.  Quantiferon.           Tobacco abuse    Overview   No ready to quit.            Other Visit Diagnoses         Pulmonary nodule        Relevant Orders    AFB Culture & Smear    Quantiferon Gold TB    NM PET CT FDG Skull Base to Mid Thigh          Patient will No follow-ups on file.     CC: Send copy of this note to Holly Cotto DO          [1]   Social History  Tobacco Use   Smoking Status Every Day    Current packs/day: 0.50    Average packs/day: 0.5 packs/day for 56.3 years (28.2 ttl pk-yrs)    Types: Cigarettes    Start date: 1969   Smokeless Tobacco Never   [2]   Current Outpatient Medications   Medication Sig Dispense Refill    albuterol (PROVENTIL/VENTOLIN HFA) 90 mcg/actuation inhaler Inhale 1-2 puffs into the lungs every 4 to 6 hours as needed for Wheezing or Shortness of Breath (chest tightness). (Patient not taking: Reported on 7/29/2024) 18 g 11    ergocalciferol (ERGOCALCIFEROL) 50,000 unit Cap Take 1 capsule (50,000 Units total) by mouth every Sunday. (Patient not taking: Reported on 4/24/2025) 12 capsule 3     No current  facility-administered medications for this visit.

## 2025-04-25 LAB
MITOGEN MINUS NIL (OHS): 9.96
NIL TB SYNCED (OHS): 0.04
QUANTIFERON GOLD INTERP (OHS): NEGATIVE
TB1 AG MINUS NIL (OHS): 0.01
TB2 AG MINUS NIL (OHS): 0.01

## 2025-04-28 ENCOUNTER — LAB VISIT (OUTPATIENT)
Dept: LAB | Facility: HOSPITAL | Age: 70
End: 2025-04-28
Payer: MEDICARE

## 2025-04-28 DIAGNOSIS — R91.1 PULMONARY NODULE: ICD-10-CM

## 2025-04-28 PROCEDURE — 87206 SMEAR FLUORESCENT/ACID STAI: CPT | Mod: HCNC

## 2025-04-28 PROCEDURE — 87116 MYCOBACTERIA CULTURE: CPT | Mod: HCNC

## 2025-04-29 LAB — ACID FAST MOD KINY STN SPEC: NORMAL

## 2025-04-30 ENCOUNTER — HOSPITAL ENCOUNTER (OUTPATIENT)
Dept: RADIOLOGY | Facility: HOSPITAL | Age: 70
Discharge: HOME | End: 2025-04-30
Attending: INTERNAL MEDICINE
Payer: MEDICARE

## 2025-04-30 ENCOUNTER — RESULTS FOLLOW-UP (OUTPATIENT)
Dept: PULMONOLOGY | Facility: CLINIC | Age: 70
End: 2025-04-30
Payer: MEDICARE

## 2025-04-30 DIAGNOSIS — R91.1 PULMONARY NODULE: ICD-10-CM

## 2025-04-30 LAB — POCT GLUCOSE: 96 MG/DL (ref 70–110)

## 2025-04-30 PROCEDURE — A9552 F18 FDG: HCPCS | Mod: HCNC | Performed by: INTERNAL MEDICINE

## 2025-04-30 PROCEDURE — 78815 PET IMAGE W/CT SKULL-THIGH: CPT | Mod: 26,HCNC,PI, | Performed by: NUCLEAR MEDICINE

## 2025-04-30 PROCEDURE — 78815 PET IMAGE W/CT SKULL-THIGH: CPT | Mod: TC,HCNC

## 2025-04-30 RX ORDER — FLUDEOXYGLUCOSE F18 500 MCI/ML
12.12 INJECTION INTRAVENOUS
Status: COMPLETED | OUTPATIENT
Start: 2025-04-30 | End: 2025-04-30

## 2025-04-30 RX ADMIN — FLUDEOXYGLUCOSE F-18 12.12 MILLICURIE: 500 INJECTION INTRAVENOUS at 12:04

## 2025-05-02 DIAGNOSIS — D49.9 NEOPLASM OF UNSPECIFIED BEHAVIOR OF UNSPECIFIED SITE: ICD-10-CM

## 2025-05-02 DIAGNOSIS — R91.8 PULMONARY NODULES: Primary | ICD-10-CM

## 2025-05-06 ENCOUNTER — TELEPHONE (OUTPATIENT)
Dept: INTERVENTIONAL RADIOLOGY/VASCULAR | Facility: CLINIC | Age: 70
End: 2025-05-06
Payer: MEDICARE

## 2025-05-12 ENCOUNTER — HOSPITAL ENCOUNTER (OUTPATIENT)
Dept: PREADMISSION TESTING | Facility: HOSPITAL | Age: 70
Discharge: HOME | End: 2025-05-12
Attending: NURSE PRACTITIONER
Payer: MEDICARE

## 2025-05-12 VITALS
BODY MASS INDEX: 17.94 KG/M2 | HEART RATE: 84 BPM | RESPIRATION RATE: 18 BRPM | SYSTOLIC BLOOD PRESSURE: 115 MMHG | HEIGHT: 70 IN | OXYGEN SATURATION: 97 % | WEIGHT: 125.31 LBS | TEMPERATURE: 98 F | DIASTOLIC BLOOD PRESSURE: 67 MMHG

## 2025-05-12 DIAGNOSIS — D49.9 NEOPLASM OF UNSPECIFIED BEHAVIOR OF UNSPECIFIED SITE: ICD-10-CM

## 2025-05-12 DIAGNOSIS — R91.8 PULMONARY NODULES: ICD-10-CM

## 2025-05-12 LAB
ABSOLUTE EOSINOPHIL (OHS): 0.23 K/UL
ABSOLUTE MONOCYTE (OHS): 0.91 K/UL (ref 0.3–1)
ABSOLUTE NEUTROPHIL COUNT (OHS): 6.05 K/UL (ref 1.8–7.7)
ANION GAP (OHS): 8 MMOL/L (ref 8–16)
BASOPHILS # BLD AUTO: 0.06 K/UL
BASOPHILS NFR BLD AUTO: 0.6 %
BUN SERPL-MCNC: 13 MG/DL (ref 8–23)
CALCIUM SERPL-MCNC: 9.4 MG/DL (ref 8.7–10.5)
CHLORIDE SERPL-SCNC: 108 MMOL/L (ref 95–110)
CO2 SERPL-SCNC: 26 MMOL/L (ref 23–29)
CREAT SERPL-MCNC: 0.8 MG/DL (ref 0.5–1.4)
ERYTHROCYTE [DISTWIDTH] IN BLOOD BY AUTOMATED COUNT: 13 % (ref 11.5–14.5)
GFR SERPLBLD CREATININE-BSD FMLA CKD-EPI: >60 ML/MIN/1.73/M2
GLUCOSE SERPL-MCNC: 92 MG/DL (ref 70–110)
HCT VFR BLD AUTO: 42.5 % (ref 37–48.5)
HGB BLD-MCNC: 14.2 GM/DL (ref 12–16)
IMM GRANULOCYTES # BLD AUTO: 0.03 K/UL (ref 0–0.04)
IMM GRANULOCYTES NFR BLD AUTO: 0.3 % (ref 0–0.5)
INR PPP: 1 (ref 0.8–1.2)
LYMPHOCYTES # BLD AUTO: 3.12 K/UL (ref 1–4.8)
MCH RBC QN AUTO: 30 PG (ref 27–31)
MCHC RBC AUTO-ENTMCNC: 33.4 G/DL (ref 32–36)
MCV RBC AUTO: 90 FL (ref 82–98)
NUCLEATED RBC (/100WBC) (OHS): 0 /100 WBC
PLATELET # BLD AUTO: 230 K/UL (ref 150–450)
PMV BLD AUTO: 11.4 FL (ref 9.2–12.9)
POTASSIUM SERPL-SCNC: 4.6 MMOL/L (ref 3.5–5.1)
PROTHROMBIN TIME: 11.4 SECONDS (ref 9–12.5)
RBC # BLD AUTO: 4.73 M/UL (ref 4–5.4)
RELATIVE EOSINOPHIL (OHS): 2.2 %
RELATIVE LYMPHOCYTE (OHS): 30 % (ref 18–48)
RELATIVE MONOCYTE (OHS): 8.8 % (ref 4–15)
RELATIVE NEUTROPHIL (OHS): 58.1 % (ref 38–73)
SODIUM SERPL-SCNC: 142 MMOL/L (ref 136–145)
WBC # BLD AUTO: 10.4 K/UL (ref 3.9–12.7)

## 2025-05-12 PROCEDURE — 82310 ASSAY OF CALCIUM: CPT

## 2025-05-12 PROCEDURE — 85025 COMPLETE CBC W/AUTO DIFF WBC: CPT

## 2025-05-12 PROCEDURE — 85610 PROTHROMBIN TIME: CPT

## 2025-05-12 NOTE — DISCHARGE INSTRUCTIONS
YOUR PROCEDURE WILL BE AT [facility] at [address], [place] [address]           Enter through the Main Entrance facing [address].      Your procedure  is scheduled for __5/14/2025________.   Arrive in Same Day Surgery at __11:30 am__________    You may have up to three visitors.  No children under 18 years old.     You will be going to the Same Day Surgery Unit on the 2nd floor of the hospital.    Report to the Same Day Surgery Registration Desk in the hallway.(Just beside the Same Day Surgery Unit)                    DO NOT PARK IN THE GARAGE.  THERE IS NO OPEN ENTRANCE TO THE HOSPITAL BUILDING AND NO ELEVATOR.      Important instructions:  Do not eat or drink anything after midnight.         SEE MEDICATION SHEET.   TAKE MEDICATIONS AS DIRECTED WITH SIPS OF WATER.    Do not take any diabetic medication on the morning of surgery unless instructed to do so by your doctor or pre op nurse.  Be sure to take all blood pressure medications.      STOP taking for 7 days before surgery:    Aspirin    Ibuprofen  (Advil, Motrin)  Mobic (meloxicam, celebrex)  Aleve (naproxen),   Fish oil, Krill oil and Vitamin E  Headache Powders (BC Powder, Goody's Powder)             You may take Tylenol if needed which is not a blood thinner.    Please shower the night before and the morning of your surgery.      Contact lenses and removable denture work may not be worn during your procedure.    You may wear deodorant only. If you are having breast surgery, do not wear deodorant on the operative side.    Do not wear powder, body lotion, perfume/cologne or make-up.    Do not wear any jewelry or have any metal on your body.    You will be asked to remove any dentures or partials for the procedure.    If you are going home on the same day of surgery, you must arrange for a family member or a friend to drive you home.  Public transportation is prohibited.  You will not be able to drive home if you  were given anesthesia or sedation.    Patients who want to have their Post-op prescriptions filled from our in-house [facility], bring a Credit/Debit Card  or cash with you. A co-pay may be required.  The pharmacy closes at 5:30 pm.    Children under 18 years of age require a parent/guardian present the entire time that they are here.    Wear loose fitting clothes allowing for bandages.    Please leave money and valuables home.      You may bring your cell phone.    Call the doctor if fever or illness should occur before your surgery.    Call [phone] to contact us here if needed.

## 2025-05-13 ENCOUNTER — TELEPHONE (OUTPATIENT)
Dept: INTERVENTIONAL RADIOLOGY/VASCULAR | Facility: HOSPITAL | Age: 70
End: 2025-05-13
Payer: MEDICARE

## 2025-05-14 ENCOUNTER — HOSPITAL ENCOUNTER (OUTPATIENT)
Dept: RADIOLOGY | Facility: HOSPITAL | Age: 70
Discharge: HOME | End: 2025-05-14
Attending: INTERNAL MEDICINE
Payer: MEDICARE

## 2025-05-14 ENCOUNTER — HOSPITAL ENCOUNTER (OUTPATIENT)
Dept: INTERVENTIONAL RADIOLOGY/VASCULAR | Facility: HOSPITAL | Age: 70
Discharge: HOME | End: 2025-05-14
Attending: NURSE PRACTITIONER
Payer: MEDICARE

## 2025-05-14 VITALS
DIASTOLIC BLOOD PRESSURE: 65 MMHG | RESPIRATION RATE: 20 BRPM | TEMPERATURE: 98 F | HEART RATE: 69 BPM | OXYGEN SATURATION: 96 % | SYSTOLIC BLOOD PRESSURE: 126 MMHG

## 2025-05-14 DIAGNOSIS — R91.8 PULMONARY NODULES: ICD-10-CM

## 2025-05-14 DIAGNOSIS — R91.1 LEFT LOWER LOBE PULMONARY NODULE: ICD-10-CM

## 2025-05-14 PROCEDURE — 63600175 PHARM REV CODE 636 W HCPCS: Performed by: INTERNAL MEDICINE

## 2025-05-14 PROCEDURE — 99152 MOD SED SAME PHYS/QHP 5/>YRS: CPT

## 2025-05-14 PROCEDURE — 71045 X-RAY EXAM CHEST 1 VIEW: CPT | Mod: TC,FY

## 2025-05-14 PROCEDURE — 71045 X-RAY EXAM CHEST 1 VIEW: CPT | Mod: 26,,, | Performed by: RADIOLOGY

## 2025-05-14 RX ORDER — SODIUM CHLORIDE 9 MG/ML
INJECTION, SOLUTION INTRAVENOUS CONTINUOUS
Status: DISCONTINUED | OUTPATIENT
Start: 2025-05-14 | End: 2025-05-15 | Stop reason: HOSPADM

## 2025-05-14 RX ORDER — FENTANYL CITRATE 50 UG/ML
INJECTION, SOLUTION INTRAMUSCULAR; INTRAVENOUS
Status: COMPLETED | OUTPATIENT
Start: 2025-05-14 | End: 2025-05-14

## 2025-05-14 RX ORDER — MIDAZOLAM HYDROCHLORIDE 2 MG/2ML
INJECTION, SOLUTION INTRAMUSCULAR; INTRAVENOUS
Status: COMPLETED | OUTPATIENT
Start: 2025-05-14 | End: 2025-05-14

## 2025-05-14 RX ORDER — LIDOCAINE HYDROCHLORIDE 10 MG/ML
1 INJECTION, SOLUTION EPIDURAL; INFILTRATION; INTRACAUDAL; PERINEURAL ONCE
Status: DISCONTINUED | OUTPATIENT
Start: 2025-05-14 | End: 2025-05-15 | Stop reason: HOSPADM

## 2025-05-14 RX ORDER — LIDOCAINE HYDROCHLORIDE 10 MG/ML
INJECTION, SOLUTION INFILTRATION; PERINEURAL
Status: COMPLETED | OUTPATIENT
Start: 2025-05-14 | End: 2025-05-14

## 2025-05-14 RX ADMIN — LIDOCAINE HYDROCHLORIDE 5 ML: 10 INJECTION, SOLUTION INFILTRATION; PERINEURAL at 01:05

## 2025-05-14 RX ADMIN — FENTANYL CITRATE 50 MCG: 50 INJECTION, SOLUTION INTRAMUSCULAR; INTRAVENOUS at 01:05

## 2025-05-14 RX ADMIN — MIDAZOLAM HYDROCHLORIDE 1 MG: 1 INJECTION, SOLUTION INTRAMUSCULAR; INTRAVENOUS at 01:05

## 2025-05-14 NOTE — H&P
VIR Pre-Procedure H&P      SUBJECTIVE:          History of Present Illness:  Renata Garcia is a 69 y.o. female who presents for LLL lung biopsy   History reviewed. No pertinent past medical history.  Past Surgical History:   Procedure Laterality Date    COLONOSCOPY N/A 09/18/2024    Procedure: COLONOSCOPY;  Surgeon: Benoit Chin MD;  Location: [facility];  Service: Endoscopy;  Laterality: N/A;  8/21 ref  by LEANDRO HERNANDEZ, PEG, instr. to portal-st  9/10/24- lvm/portal for pc. DBM  9/16 pt r/s, PEG, portal.efren    LASER ABLATION OF THE CERVIX         Home Meds:   Prior to Admission medications    Not on File          Allergies:   Review of patient's allergies indicates:   Allergen Reactions    Codeine Nausea And Vomiting            OBJECTIVE:     Vital Signs (Most Recent)  Temp: 98 °F (36.7 °C) (05/14/25 1104)  Pulse: 80 (05/14/25 1104)  Resp: 18 (05/14/25 1104)  BP: 123/73 (05/14/25 1104)  SpO2: 95 % (05/14/25 1104)    Physical Exam:  ASA: III  Mallampati: II    General: no acute distress  Mental Status: alert and oriented to person, place and time  HEENT: normocephalic, atraumatic  Chest: unlabored breathing  Heart: regular heart rate  Abdomen: Soft, non tender.   Extremity: moves all extremities    Laboratory  Lab Results   Component Value Date    INR 1.0 05/12/2025       Lab Results   Component Value Date    WBC 10.40 05/12/2025    HGB 14.2 05/12/2025    HCT 42.5 05/12/2025    MCV 90 05/12/2025     05/12/2025      Lab Results   Component Value Date    GLU 92 05/12/2025     05/12/2025    K 4.6 05/12/2025     05/12/2025    CO2 26 05/12/2025    BUN 13 05/12/2025    CREATININE 0.8 05/12/2025    CALCIUM 9.4 05/12/2025    ALT 11 07/13/2024    AST 16 07/13/2024    ALBUMIN 4.0 07/13/2024    BILITOT 0.5 07/13/2024       Imaging    All recent imaging Independently reviewed by myself.       ASSESSMENT/PLAN:     Patient will undergo lung biopsy.  Sedation Plan: moderate sedation.    Dave Neves,  MD  VIR

## 2025-05-14 NOTE — PLAN OF CARE
states 2nd xray  not needed.pt alert  and drinking well.dressing to back incsion intact scant drainage present.pt and spouse vb undrerstanding of instuctions.pt desires to go home and will call  for concerns

## 2025-05-14 NOTE — Clinical Note
Left: Back.   Scrubbed with Chlorhexidine/Alcohol.    Hair: N/A.  Skin prep dry before draping.  Prepped by: Dave Neves MD 5/14/2025 1:44 PM.

## 2025-05-14 NOTE — PROCEDURES
VIR Procedure Note    Pre Op Diagnosis:  Left lung nodule    Post Op Diagnosis:  Same    Procedure:  CT guided lung biopsy    Procedure performed by:  Dave Neves MD    Written Informed Consent Obtained:  Yes    Specimen Removed:  Yes; core lung samples    Estimated Blood Loss: minimal    Findings:     CT guided biopsy of left lung nodule using 19/20g coaxial system. 6 samples were sent to pathology.        Dave Neves MD  VIR

## 2025-05-14 NOTE — DISCHARGE INSTRUCTIONS
ACTIVITY LEVEL: If you have received sedation or an anesthetic, you may feel sleepy for several hours. Rest  until you are more awake. Slowly resume your normal activities. No heavy lifting, nothing more that 10-15 pounds until surgery site is healed.      NO driving, alcoholic beverages or signing legal documents for next 24 hours or while taking pain medication    BATHING: You may shower after your dressing is removed, but no tub baths, hot tubs, saunas or swimming until you see the doctor.    DIET: You may resume your home diet. If nausea is present, increase your diet gradually with fluids and bland foods. Drink extra water for the next 48 hours.     Medications: Pain medication should be taken only if needed and as directed. IF antibiotics are prescribed, the  medication should be taken until completed. You will be given an updated list of you medications.    CALL THE DOCTOR:  Fever over 101°F --any signs of infection including body aches, fever, chills, redness at surgery site   Severe pain that doesnt go away with medication.  Upset stomach and vomiting that is persistent  Problems urinating, unable to urinate or heavy bleeding (with or without clots)      Fall Prevention  Millions of people fall every year and injure themselves. You may have had anesthesia or sedation which may increase your risk of falling. You may have health issues that put you at an increased risk of falling.     Here are ways to reduce your risk of falling.    Make your home safe by keeping walkways clear of objects you may trip over.  Use non-slip pads under rugs. Do not use area rugs or small throw rugs.  Use non-slip mats in bathtubs and showers.  Install handrails and lights on staircases.  Do not walk in poorly lit areas.  Do not stand on chairs or wobbly ladders.  Use caution when reaching overhead or looking upward. This position can cause a loss of balance.  Be sure your shoes fit properly, have non-slip bottoms and are in good  condition.   Wear shoes both inside and out. Avoid going barefoot or wearing slippers.  Be cautious when going up and down stairs, curbs, and when walking on uneven sidewalks.  If your balance is poor, consider using a cane or walker.  If your fall was related to alcohol use, stop or limit alcohol intake.   If your fall was related to use of sleeping medicines, talk to your doctor about this. You may need to reduce your dosage at bedtime if you awaken during the night to go to the bathroom.    To reduce the need for nighttime bathroom trips:  Avoid drinking fluids for several hours before going to bed  Empty your bladder before going to bed  Men can keep a urinal at the bedside  Stay as active as you can. Balance, flexibility, strength, and endurance all come from exercise. They all play a role in preventing falls. Ask your healthcare provider which types of activity are right for you.  Get your vision checked on a regular basis.  If you have pets, know where they are before you stand up or walk so you don't trip over them.  Use night lights. LUNG BX DISCHARGE INSTRUCTIONS    Remove adhesive bandage in 48 hours, sponge bathe during this time.   You may shower after 48 hours, gently clean site and pat dry after showering.   Cover puncture site with an adhesive bandage for 5 days.  No heavy lifting; nothing heavier than a gallon of milk for 5 days.   No strenuous activity for 5 days.   Check puncture site throughout the day for bleeding.  If bleeding occurs, apply pressure over the site and call your physician/go to the Emergency Department.   Do not submerge in a bath tub, swimming pool, water, spa, etc.., for 7 days.   Resume normal diet.   Change positions slowly for the first 24 hours to avoid lightheadedness or dizziness.  Hydrate for the next 24 hours (unless you are on fluid restrictions).   Report a temperature of 101.4 or greater, redness at the puncture site, increased discomfort at puncture site, drainage  that is discolored or has a foul smell to your physician.    Make a follow-up appointment with your physician to review biopsy results.

## 2025-05-14 NOTE — DISCHARGE SUMMARY
VIR Discharge Summary      Hospital Course: No complications    Admit Date: 5/14/2025  Discharge Date: 05/14/2025     Instructions Given to Patient: Yes  Diet: Resume prior diet  Activity:   Activity as tolerated and no driving for today.    Description of Condition on Discharge: Stable  Vital Signs (Most Recent): Temp: 98 °F (36.7 °C) (05/14/25 1104)  Pulse: 77 (05/14/25 1406)  Resp: 12 (05/14/25 1406)  BP: 127/65 (05/14/25 1406)  SpO2: 99 % (05/14/25 1406)    Discharge Disposition: Home    Discharge Diagnosis: Lung bx.          Dave Neves MD  VIR

## 2025-05-23 ENCOUNTER — TELEPHONE (OUTPATIENT)
Dept: INTERVENTIONAL RADIOLOGY/VASCULAR | Facility: HOSPITAL | Age: 70
End: 2025-05-23
Payer: MEDICARE

## 2025-05-23 DIAGNOSIS — C34.32 SQUAMOUS CELL CARCINOMA OF LOWER LOBE OF LEFT LUNG: Primary | ICD-10-CM

## 2025-05-26 ENCOUNTER — TELEPHONE (OUTPATIENT)
Dept: PULMONOLOGY | Facility: HOSPITAL | Age: 70
End: 2025-05-26
Payer: MEDICARE

## 2025-05-26 ENCOUNTER — TELEPHONE (OUTPATIENT)
Dept: PULMONOLOGY | Facility: CLINIC | Age: 70
End: 2025-05-26
Payer: MEDICARE

## 2025-05-26 DIAGNOSIS — C34.32 MALIGNANT NEOPLASM OF LOWER LOBE OF LEFT LUNG: Primary | ICD-10-CM

## 2025-05-26 NOTE — TELEPHONE ENCOUNTER
Result of ct guided biopsy d/w patient.  Patient is aware of diagnosis of NSCLC.  Will refer to oncology for their inputs.  Also with nodule in angelica with suv of 1.4.  will present patient at tumor board for further treatment recommendation.      Please schedule oncology appointment.

## 2025-05-28 ENCOUNTER — TELEPHONE (OUTPATIENT)
Dept: PULMONOLOGY | Facility: HOSPITAL | Age: 70
End: 2025-05-28
Payer: MEDICARE

## 2025-05-28 ENCOUNTER — TELEPHONE (OUTPATIENT)
Dept: PULMONOLOGY | Facility: CLINIC | Age: 70
End: 2025-05-28
Payer: MEDICARE

## 2025-05-28 ENCOUNTER — TELEPHONE (OUTPATIENT)
Dept: HEMATOLOGY/ONCOLOGY | Facility: CLINIC | Age: 70
End: 2025-05-28
Payer: MEDICARE

## 2025-05-28 ENCOUNTER — TUMOR BOARD CONFERENCE (OUTPATIENT)
Dept: CARDIOTHORACIC SURGERY | Facility: HOSPITAL | Age: 70
End: 2025-05-28
Payer: MEDICARE

## 2025-05-28 DIAGNOSIS — C34.32 MALIGNANT NEOPLASM OF LOWER LOBE OF LEFT LUNG: Primary | ICD-10-CM

## 2025-05-28 DIAGNOSIS — R91.8 PULMONARY NODULES: Primary | ICD-10-CM

## 2025-05-28 NOTE — TELEPHONE ENCOUNTER
----- Message from Yousuf Kohler sent at 5/28/2025  1:19 PM CDT -----  Appointment is needed for the above patient. Can you give the patient a call to set up an appointment per Dr. Green.Tea Kohler/[facility] P#[phone]

## 2025-05-28 NOTE — TELEPHONE ENCOUNTER
Sent Dr. Yoon and Dr. Motta staff a message to get patient set up.                      Case presented at tumor conference.  Recommendation would be for ebus evaluation + CT surgery for resection.  Will monitor angelica ggo with serial imaging.  Recommendation d/w patient and she is in agreement.       Please schedule patient to see    Dr. Yoon or Jennifer for EBUS and   CT surgery for resection evaluation.

## 2025-05-28 NOTE — TELEPHONE ENCOUNTER
Patient scheduled with Dr Yoon on 6/26/25 at 8:30am for ebus/robotic bronchoscopy consult per Dr Green. Appointment mailed.

## 2025-05-28 NOTE — TELEPHONE ENCOUNTER
I spoke with patient in regards to scheduling a appointment per Dr. Green. Patient is scheduled with Dr. Yoon first available appointment on 7/7/25 at 8 am. Patient is added to the wait list. Patient confirmed and verbalized understanding.

## 2025-05-28 NOTE — TELEPHONE ENCOUNTER
Case presented at tumor conference.  Recommendation would be for ebus evaluation + CT surgery for resection.  Will monitor angelica ggo with serial imaging.  Recommendation d/w patient and she is in agreement.      Please schedule patient to see    Dr. Yoon or Jennifer for EBUS and   CT surgery for resection evaluation.

## 2025-05-28 NOTE — PATIENT CARE CONFERENCE
[facility]      THORACIC MULTIDISCIPLINARY TUMOR BOARD  PATIENT REVIEW FORM  ________________________________________________________________________    CLINIC #: [id number]  DATE: 05/28/2025    TUMOR SITE:   NSCLC    PRESENTER:   Dr. Green     PATIENT SUMMARY:   69 y.o. female current smoker with COPD now with LLL NSCLC. CT chest 3/10/24 with spiculated solid nodule in the left lower lobe measuring 1.2 cm and left upper lobe GGO measuring 1.2 cm. PET with left lower lobe measuring 1.1 cm, max SUV 2.6, and a ground-glass nodule in the left upper lobe measuring 1.5 cm, max SUV 1.4. Percutaneous biopsy of LLL nodule returned invasive squamous cell carcinoma.     PFTs: FEV1 - 62%, DLCO - 66%     BOARD RECOMMENDATIONS:   Refer to surgery for consideration of resection of biopsy proven lesion in the LLL. Consider EBUS given persistence of ground glass in BLAS.     CONSULT NEEDED:     [x] Surgery    [] Hem/Onc    [] Rad/Onc    [] Dietary                 [] Social Service    [] Psychology       [] Pulmonology    Clinical Stage: Tumor 1 Node(s)  Metastasis   Pathologic Stage: Tumor  Node(s)  Metastasis     GROUP STAGE:     [] O    [x] 1A    [] IB    [] IIA    [] IIB     [] IIIA     [] IIIB     [] IIIC    [] IV                               [] Local recurrence     [] Regional recurrence     [] Distant recurrence                   [x] NSCLC     [] SCLC     Tumor type     Unstageable:      [] Yes     [] No  Metastatic site(s):          [x] Patricia'l Treatment Guidelines reviewed and care planned is consistent with guidelines.         (i.e., NCCN, NCI, PD, ACO, AUA, etc.)    PRESENTATION AT CANCER CONFERENCE:         [] Prospective    [] Retrospective     [] Follow-Up          [] Eligible for clinical trial

## 2025-05-29 NOTE — NURSING
Patient notified of the scheduled appointment with Dr. Alvarez for 06/05/25 and for PFTs.  Patient is active on the patient portal.  Oncology Navigation   Intake      Treatment  Current Status: Active  Date Presented to Tumor Board: 05/28/25  Presentation Notes: BOARD RECOMMENDATIONS:   Refer to surgery for consideration of resection of biopsy proven lesion in the LLL. Consider EBUS given persistence of ground glass in BLAS.    Surgical Oncologist: Kim  Consult Date: 06/05/25                          Acuity      Follow Up  No follow-ups on file.

## 2025-06-02 ENCOUNTER — LAB VISIT (OUTPATIENT)
Dept: LAB | Facility: HOSPITAL | Age: 70
End: 2025-06-02
Attending: STUDENT IN AN ORGANIZED HEALTH CARE EDUCATION/TRAINING PROGRAM
Payer: MEDICARE

## 2025-06-02 ENCOUNTER — HOSPITAL ENCOUNTER (OUTPATIENT)
Dept: PULMONOLOGY | Facility: CLINIC | Age: 70
Discharge: HOME | End: 2025-06-02
Payer: MEDICARE

## 2025-06-02 ENCOUNTER — OFFICE VISIT (OUTPATIENT)
Dept: CARDIOTHORACIC SURGERY | Facility: CLINIC | Age: 70
End: 2025-06-02
Payer: MEDICARE

## 2025-06-02 VITALS
OXYGEN SATURATION: 98 % | SYSTOLIC BLOOD PRESSURE: 131 MMHG | WEIGHT: 124 LBS | HEART RATE: 64 BPM | HEIGHT: 70 IN | DIASTOLIC BLOOD PRESSURE: 81 MMHG | BODY MASS INDEX: 17.75 KG/M2

## 2025-06-02 DIAGNOSIS — D68.9 COAGULOPATHY: ICD-10-CM

## 2025-06-02 DIAGNOSIS — D68.9 COAGULOPATHY: Primary | ICD-10-CM

## 2025-06-02 DIAGNOSIS — Z01.810 PRE-PROCEDURAL CARDIOVASCULAR EXAMINATION: ICD-10-CM

## 2025-06-02 DIAGNOSIS — C34.32 MALIGNANT NEOPLASM OF LOWER LOBE OF LEFT LUNG: ICD-10-CM

## 2025-06-02 DIAGNOSIS — R91.8 PULMONARY NODULES: ICD-10-CM

## 2025-06-02 LAB
ABSOLUTE EOSINOPHIL (OHS): 0.17 K/UL
ABSOLUTE MONOCYTE (OHS): 0.58 K/UL (ref 0.3–1)
ABSOLUTE NEUTROPHIL COUNT (OHS): 5.34 K/UL (ref 1.8–7.7)
ALBUMIN SERPL BCP-MCNC: 4.1 G/DL (ref 3.5–5.2)
ALP SERPL-CCNC: 73 UNIT/L (ref 40–150)
ALT SERPL W/O P-5'-P-CCNC: 13 UNIT/L (ref 10–44)
ANION GAP (OHS): 9 MMOL/L (ref 8–16)
APTT PPP: 24.7 SECONDS (ref 21–32)
AST SERPL-CCNC: 14 UNIT/L (ref 11–45)
BASOPHILS # BLD AUTO: 0.06 K/UL
BASOPHILS NFR BLD AUTO: 0.7 %
BILIRUB SERPL-MCNC: 0.4 MG/DL (ref 0.1–1)
BUN SERPL-MCNC: 15 MG/DL (ref 8–23)
CALCIUM SERPL-MCNC: 9.1 MG/DL (ref 8.7–10.5)
CHLORIDE SERPL-SCNC: 107 MMOL/L (ref 95–110)
CO2 SERPL-SCNC: 26 MMOL/L (ref 23–29)
CREAT SERPL-MCNC: 0.7 MG/DL (ref 0.5–1.4)
ERYTHROCYTE [DISTWIDTH] IN BLOOD BY AUTOMATED COUNT: 12.9 % (ref 11.5–14.5)
GFR SERPLBLD CREATININE-BSD FMLA CKD-EPI: >60 ML/MIN/1.73/M2
GLUCOSE SERPL-MCNC: 104 MG/DL (ref 70–110)
HCT VFR BLD AUTO: 43.8 % (ref 37–48.5)
HGB BLD-MCNC: 14.1 GM/DL (ref 12–16)
IMM GRANULOCYTES # BLD AUTO: 0.03 K/UL (ref 0–0.04)
IMM GRANULOCYTES NFR BLD AUTO: 0.3 % (ref 0–0.5)
INR PPP: 1 (ref 0.8–1.2)
LYMPHOCYTES # BLD AUTO: 2.69 K/UL (ref 1–4.8)
MCH RBC QN AUTO: 29.3 PG (ref 27–31)
MCHC RBC AUTO-ENTMCNC: 32.2 G/DL (ref 32–36)
MCV RBC AUTO: 91 FL (ref 82–98)
NUCLEATED RBC (/100WBC) (OHS): 0 /100 WBC
PLATELET # BLD AUTO: 217 K/UL (ref 150–450)
PMV BLD AUTO: 11.1 FL (ref 9.2–12.9)
POTASSIUM SERPL-SCNC: 4.7 MMOL/L (ref 3.5–5.1)
PREALB SERPL-MCNC: 20 MG/DL (ref 20–43)
PROT SERPL-MCNC: 7 GM/DL (ref 6–8.4)
PROTHROMBIN TIME: 11.3 SECONDS (ref 9–12.5)
RBC # BLD AUTO: 4.81 M/UL (ref 4–5.4)
RELATIVE EOSINOPHIL (OHS): 1.9 %
RELATIVE LYMPHOCYTE (OHS): 30.3 % (ref 18–48)
RELATIVE MONOCYTE (OHS): 6.5 % (ref 4–15)
RELATIVE NEUTROPHIL (OHS): 60.3 % (ref 38–73)
SODIUM SERPL-SCNC: 142 MMOL/L (ref 136–145)
WBC # BLD AUTO: 8.87 K/UL (ref 3.9–12.7)

## 2025-06-02 PROCEDURE — 99999 PR PBB SHADOW E&M-EST. PATIENT-LVL III: CPT | Mod: PBBFAC,,, | Performed by: STUDENT IN AN ORGANIZED HEALTH CARE EDUCATION/TRAINING PROGRAM

## 2025-06-02 PROCEDURE — 1126F AMNT PAIN NOTED NONE PRSNT: CPT | Mod: CPTII,S$GLB,, | Performed by: STUDENT IN AN ORGANIZED HEALTH CARE EDUCATION/TRAINING PROGRAM

## 2025-06-02 PROCEDURE — 84134 ASSAY OF PREALBUMIN: CPT

## 2025-06-02 PROCEDURE — 3075F SYST BP GE 130 - 139MM HG: CPT | Mod: CPTII,S$GLB,, | Performed by: STUDENT IN AN ORGANIZED HEALTH CARE EDUCATION/TRAINING PROGRAM

## 2025-06-02 PROCEDURE — 3288F FALL RISK ASSESSMENT DOCD: CPT | Mod: CPTII,S$GLB,, | Performed by: STUDENT IN AN ORGANIZED HEALTH CARE EDUCATION/TRAINING PROGRAM

## 2025-06-02 PROCEDURE — 36415 COLL VENOUS BLD VENIPUNCTURE: CPT

## 2025-06-02 PROCEDURE — 85730 THROMBOPLASTIN TIME PARTIAL: CPT

## 2025-06-02 PROCEDURE — 3008F BODY MASS INDEX DOCD: CPT | Mod: CPTII,S$GLB,, | Performed by: STUDENT IN AN ORGANIZED HEALTH CARE EDUCATION/TRAINING PROGRAM

## 2025-06-02 PROCEDURE — 3079F DIAST BP 80-89 MM HG: CPT | Mod: CPTII,S$GLB,, | Performed by: STUDENT IN AN ORGANIZED HEALTH CARE EDUCATION/TRAINING PROGRAM

## 2025-06-02 PROCEDURE — 1101F PT FALLS ASSESS-DOCD LE1/YR: CPT | Mod: CPTII,S$GLB,, | Performed by: STUDENT IN AN ORGANIZED HEALTH CARE EDUCATION/TRAINING PROGRAM

## 2025-06-02 PROCEDURE — 82040 ASSAY OF SERUM ALBUMIN: CPT

## 2025-06-02 PROCEDURE — 99204 OFFICE O/P NEW MOD 45 MIN: CPT | Mod: 57,S$GLB,, | Performed by: STUDENT IN AN ORGANIZED HEALTH CARE EDUCATION/TRAINING PROGRAM

## 2025-06-02 PROCEDURE — 85025 COMPLETE CBC W/AUTO DIFF WBC: CPT

## 2025-06-02 PROCEDURE — 85610 PROTHROMBIN TIME: CPT

## 2025-06-03 ENCOUNTER — OFFICE VISIT (OUTPATIENT)
Dept: HEMATOLOGY/ONCOLOGY | Facility: CLINIC | Age: 70
End: 2025-06-03
Payer: MEDICARE

## 2025-06-03 VITALS
WEIGHT: 125.25 LBS | HEART RATE: 69 BPM | BODY MASS INDEX: 17.93 KG/M2 | HEIGHT: 70 IN | SYSTOLIC BLOOD PRESSURE: 142 MMHG | OXYGEN SATURATION: 97 % | DIASTOLIC BLOOD PRESSURE: 70 MMHG | TEMPERATURE: 98 F

## 2025-06-03 DIAGNOSIS — C34.32 SQUAMOUS CELL CARCINOMA OF LOWER LOBE OF LEFT LUNG: Primary | ICD-10-CM

## 2025-06-03 DIAGNOSIS — F17.200 NICOTINE DEPENDENCE, UNCOMPLICATED, UNSPECIFIED NICOTINE PRODUCT TYPE: ICD-10-CM

## 2025-06-03 DIAGNOSIS — J44.9 CHRONIC OBSTRUCTIVE PULMONARY DISEASE, UNSPECIFIED COPD TYPE: ICD-10-CM

## 2025-06-03 PROCEDURE — 3077F SYST BP >= 140 MM HG: CPT | Mod: CPTII,S$GLB,, | Performed by: INTERNAL MEDICINE

## 2025-06-03 PROCEDURE — 3078F DIAST BP <80 MM HG: CPT | Mod: CPTII,S$GLB,, | Performed by: INTERNAL MEDICINE

## 2025-06-03 PROCEDURE — 1101F PT FALLS ASSESS-DOCD LE1/YR: CPT | Mod: CPTII,S$GLB,, | Performed by: INTERNAL MEDICINE

## 2025-06-03 PROCEDURE — 1126F AMNT PAIN NOTED NONE PRSNT: CPT | Mod: CPTII,S$GLB,, | Performed by: INTERNAL MEDICINE

## 2025-06-03 PROCEDURE — 3288F FALL RISK ASSESSMENT DOCD: CPT | Mod: CPTII,S$GLB,, | Performed by: INTERNAL MEDICINE

## 2025-06-03 PROCEDURE — 99205 OFFICE O/P NEW HI 60 MIN: CPT | Mod: S$GLB,,, | Performed by: INTERNAL MEDICINE

## 2025-06-03 PROCEDURE — 99999 PR PBB SHADOW E&M-EST. PATIENT-LVL III: CPT | Mod: PBBFAC,,, | Performed by: INTERNAL MEDICINE

## 2025-06-03 PROCEDURE — 3008F BODY MASS INDEX DOCD: CPT | Mod: CPTII,S$GLB,, | Performed by: INTERNAL MEDICINE

## 2025-06-04 ENCOUNTER — TELEPHONE (OUTPATIENT)
Dept: PULMONOLOGY | Facility: CLINIC | Age: 70
End: 2025-06-04
Payer: MEDICARE

## 2025-06-04 DIAGNOSIS — R91.1 SOLITARY PULMONARY NODULE: ICD-10-CM

## 2025-06-04 DIAGNOSIS — C34.92 SQUAMOUS CELL LUNG CANCER, LEFT: Primary | ICD-10-CM

## 2025-06-04 DIAGNOSIS — C34.92 ADENOCARCINOMA, LUNG, LEFT: ICD-10-CM

## 2025-06-05 ENCOUNTER — PATIENT MESSAGE (OUTPATIENT)
Dept: PULMONOLOGY | Facility: CLINIC | Age: 70
End: 2025-06-05
Payer: MEDICARE

## 2025-06-05 LAB
DLCO ADJ PRE: 14.02 ML/(MIN*MMHG) (ref 19.56–31.03)
DLCO SINGLE BREATH LLN: 19.56
DLCO SINGLE BREATH PRE REF: 56.7 %
DLCO SINGLE BREATH REF: 25.3
DLCOC SBVA LLN: 3.06
DLCOC SBVA PRE REF: 77.2 %
DLCOC SBVA REF: 4.26
DLCOC SINGLE BREATH LLN: 19.56
DLCOC SINGLE BREATH PRE REF: 55.4 %
DLCOC SINGLE BREATH REF: 25.3
DLCOCSBVAULN: 5.45
DLCOCSINGLEBREATHULN: 31.03
DLCOCSINGLEBREATHZSCORE: -3.24
DLCOSINGLEBREATHULN: 31.03
DLCOSINGLEBREATHZSCORE: -3.14
DLCOVA LLN: 3.06
DLCOVA PRE REF: 79 %
DLCOVA PRE: 3.36 ML/(MIN*MMHG*L) (ref 3.06–5.45)
DLCOVA REF: 4.26
DLCOVAULN: 5.45
DLVAADJ PRE: 3.29 ML/(MIN*MMHG*L) (ref 3.06–5.45)
ERVN2 LLN: -16449.26
ERVN2 PRE REF: 15 %
ERVN2 PRE: 0.11 L (ref -16449.26–16450.74)
ERVN2 REF: 0.74
ERVN2ULN: ABNORMAL
FEF 25 75 LLN: 1.43
FEF 25 75 PRE REF: 30.5 %
FEF 25 75 REF: 2.83
FET100 CHG: 4.1 %
FEV05 LLN: 1.16
FEV05 REF: 2.02
FEV1 CHG: 11.2 %
FEV1 FVC LLN: 65
FEV1 FVC PRE REF: 91.4 %
FEV1 FVC REF: 78
FEV1 LLN: 2
FEV1 PRE REF: 54.3 %
FEV1 REF: 2.73
FEV1 VOL CHG: 0.17
FRCN2 LLN: 2.23
FRCN2 PRE REF: 110.3 %
FRCN2 REF: 3.05
FRCN2ULN: 3.87
FVC CHG: 12.2 %
FVC LLN: 2.61
FVC PRE REF: 58.8 %
FVC REF: 3.55
FVC VOL CHG: 0.25
ICN2REF: 2.63
IVC PRE: 2.18 L (ref 2.61–4.54)
IVC SINGLE BREATH LLN: 2.61
IVC SINGLE BREATH PRE REF: 61.4 %
IVC SINGLE BREATH REF: 3.55
IVCSINGLEBREATHULN: 4.54
LLN IC N2: -16447.37
PEF LLN: 4.71
PEF PRE REF: 64.2 %
PEF REF: 6.76
PHYSICIAN COMMENT: ABNORMAL
POST FEF 25 75: 0.96 L/S (ref 1.43–4.23)
POST FET 100: 6.52 SEC
POST FEV1 FVC: 70.31 % (ref 64.63–88.68)
POST FEV1: 1.65 L (ref 2–3.42)
POST FEV5: 1.33 L (ref 1.16–2.88)
POST FVC: 2.34 L (ref 2.61–4.54)
POST PEF: 5.2 L/S (ref 4.71–8.81)
PRE DLCO: 14.35 ML/(MIN*MMHG) (ref 19.56–31.03)
PRE FEF 25 75: 0.86 L/S (ref 1.43–4.23)
PRE FET 100: 6.26 SEC
PRE FEV05 REF: 57.9 %
PRE FEV1 FVC: 70.89 % (ref 64.63–88.68)
PRE FEV1: 1.48 L (ref 2–3.42)
PRE FEV5: 1.17 L (ref 1.16–2.88)
PRE FRC N2: 3.37 L (ref 2.23–3.87)
PRE FVC: 2.09 L (ref 2.61–4.54)
PRE IC N2: 2.09 L (ref -16447.37–16452.63)
PRE PEF: 4.34 L/S (ref 4.71–8.81)
PRE REF IC N2: 79.5 %
RVN2 LLN: 1.73
RVN2 PRE REF: 141.1 %
RVN2 PRE: 3.25 L (ref 1.73–2.88)
RVN2 REF: 2.31
RVN2TLCN2 LLN: 32.49
RVN2TLCN2 PRE REF: 141.7 %
RVN2TLCN2 PRE: 59.65 % (ref 32.49–51.67)
RVN2TLCN2 REF: 42.08
RVN2TLCN2ULN: 51.67
RVN2ULN: 2.88
TLCN2 LLN: 4.96
TLCN2 PRE REF: 91.8 %
TLCN2 PRE: 5.46 L (ref 4.96–6.93)
TLCN2 REF: 5.94
TLCN2ULN: 6.93
ULN IC N2: ABNORMAL
VA PRE: 4.27 L (ref 5.79–5.79)
VA SINGLE BREATH LLN: 5.79
VA SINGLE BREATH PRE REF: 73.6 %
VA SINGLE BREATH REF: 5.79
VASINGLEBREATHULN: 5.79
VCMAXN2 LLN: 2.61
VCMAXN2 PRE REF: 62 %
VCMAXN2 PRE: 2.2 L (ref 2.61–4.54)
VCMAXN2 REF: 3.55
VCMAXN2ULN: 4.54

## 2025-06-06 ENCOUNTER — TELEPHONE (OUTPATIENT)
Dept: CARDIOTHORACIC SURGERY | Facility: CLINIC | Age: 70
End: 2025-06-06
Payer: MEDICARE

## 2025-06-06 DIAGNOSIS — C34.32 MALIGNANT NEOPLASM OF LOWER LOBE OF LEFT LUNG: Primary | ICD-10-CM

## 2025-06-11 ENCOUNTER — PATIENT MESSAGE (OUTPATIENT)
Dept: PULMONOLOGY | Facility: CLINIC | Age: 70
End: 2025-06-11
Payer: MEDICARE

## 2025-06-11 ENCOUNTER — TUMOR BOARD CONFERENCE (OUTPATIENT)
Dept: CARDIOTHORACIC SURGERY | Facility: CLINIC | Age: 70
End: 2025-06-11
Payer: MEDICARE

## 2025-06-13 ENCOUNTER — HOSPITAL ENCOUNTER (OUTPATIENT)
Dept: CARDIOLOGY | Facility: HOSPITAL | Age: 70
Discharge: HOME | End: 2025-06-13
Attending: STUDENT IN AN ORGANIZED HEALTH CARE EDUCATION/TRAINING PROGRAM
Payer: MEDICARE

## 2025-06-13 ENCOUNTER — HOSPITAL ENCOUNTER (OUTPATIENT)
Dept: RADIOLOGY | Facility: HOSPITAL | Age: 70
Discharge: HOME | End: 2025-06-13
Attending: INTERNAL MEDICINE
Payer: MEDICARE

## 2025-06-13 VITALS
DIASTOLIC BLOOD PRESSURE: 68 MMHG | BODY MASS INDEX: 17.9 KG/M2 | WEIGHT: 125 LBS | HEIGHT: 70 IN | HEART RATE: 60 BPM | SYSTOLIC BLOOD PRESSURE: 133 MMHG

## 2025-06-13 DIAGNOSIS — Z01.810 PRE-PROCEDURAL CARDIOVASCULAR EXAMINATION: ICD-10-CM

## 2025-06-13 DIAGNOSIS — R91.1 SOLITARY PULMONARY NODULE: ICD-10-CM

## 2025-06-13 DIAGNOSIS — C34.32 MALIGNANT NEOPLASM OF LOWER LOBE OF LEFT LUNG: ICD-10-CM

## 2025-06-13 LAB
CV PHARM DOSE: 0.4 MG
CV STRESS BASE HR: 60 BPM
DIASTOLIC BLOOD PRESSURE: 68 MMHG
EJECTION FRACTION- HIGH: 65 %
END DIASTOLIC INDEX-HIGH: 153 ML/M2
END DIASTOLIC INDEX-LOW: 93 ML/M2
END SYSTOLIC INDEX-HIGH: 71 ML/M2
END SYSTOLIC INDEX-LOW: 31 ML/M2
NUC REST DIASTOLIC VOLUME INDEX: 67
NUC REST EJECTION FRACTION: 70
NUC REST SYSTOLIC VOLUME INDEX: 20
NUC STRESS DIASTOLIC VOLUME INDEX: 66
NUC STRESS EJECTION FRACTION: 70 %
NUC STRESS SYSTOLIC VOLUME INDEX: 20
OHS CV CPX 1 MINUTE RECOVERY HEART RATE: 92 BPM
OHS CV CPX 85 PERCENT MAX PREDICTED HEART RATE MALE: 129
OHS CV CPX MAX PREDICTED HEART RATE: 152
OHS CV CPX PATIENT IS FEMALE: 1
OHS CV CPX PATIENT IS MALE: 0
OHS CV CPX PEAK DIASTOLIC BLOOD PRESSURE: 73 MMHG
OHS CV CPX PEAK HEAR RATE: 61 BPM
OHS CV CPX PEAK RATE PRESSURE PRODUCT: 8357
OHS CV CPX PEAK SYSTOLIC BLOOD PRESSURE: 137 MMHG
OHS CV CPX PERCENT MAX PREDICTED HEART RATE ACHIEVED: 42
OHS CV CPX RATE PRESSURE PRODUCT PRESENTING: 7980
OHS CV INITIAL DOSE: 10.1 MCG/KG/MIN
OHS CV PEAK DOSE: 31.5 MCG/KG/MIN
RETIRED EF AND QEF - SEE NOTES: 53 %
SYSTOLIC BLOOD PRESSURE: 133 MMHG

## 2025-06-13 PROCEDURE — 93016 CV STRESS TEST SUPVJ ONLY: CPT | Mod: ,,, | Performed by: INTERNAL MEDICINE

## 2025-06-13 PROCEDURE — 71250 CT THORAX DX C-: CPT | Mod: 26,,, | Performed by: STUDENT IN AN ORGANIZED HEALTH CARE EDUCATION/TRAINING PROGRAM

## 2025-06-13 PROCEDURE — 78452 HT MUSCLE IMAGE SPECT MULT: CPT | Mod: 26,,, | Performed by: INTERNAL MEDICINE

## 2025-06-13 PROCEDURE — 71250 CT THORAX DX C-: CPT | Mod: TC

## 2025-06-13 PROCEDURE — 93018 CV STRESS TEST I&R ONLY: CPT | Mod: ,,, | Performed by: INTERNAL MEDICINE

## 2025-06-13 PROCEDURE — 78452 HT MUSCLE IMAGE SPECT MULT: CPT

## 2025-06-13 PROCEDURE — A9502 TC99M TETROFOSMIN: HCPCS | Performed by: STUDENT IN AN ORGANIZED HEALTH CARE EDUCATION/TRAINING PROGRAM

## 2025-06-13 PROCEDURE — 63600175 PHARM REV CODE 636 W HCPCS: Performed by: STUDENT IN AN ORGANIZED HEALTH CARE EDUCATION/TRAINING PROGRAM

## 2025-06-13 RX ORDER — REGADENOSON 0.08 MG/ML
0.4 INJECTION, SOLUTION INTRAVENOUS
Status: COMPLETED | OUTPATIENT
Start: 2025-06-13 | End: 2025-06-13

## 2025-06-13 RX ORDER — AMINOPHYLLINE 25 MG/ML
75 INJECTION, SOLUTION INTRAVENOUS ONCE
Status: COMPLETED | OUTPATIENT
Start: 2025-06-13 | End: 2025-06-13

## 2025-06-13 RX ADMIN — REGADENOSON 0.4 MG: 0.08 INJECTION, SOLUTION INTRAVENOUS at 10:06

## 2025-06-13 RX ADMIN — TETROFOSMIN 10.1 MILLICURIE: 1.38 INJECTION, POWDER, LYOPHILIZED, FOR SOLUTION INTRAVENOUS at 09:06

## 2025-06-13 RX ADMIN — AMINOPHYLLINE 75 MG: 25 INJECTION, SOLUTION INTRAVENOUS at 10:06

## 2025-06-13 RX ADMIN — TETROFOSMIN 31.5 MILLICURIE: 1.38 INJECTION, POWDER, LYOPHILIZED, FOR SOLUTION INTRAVENOUS at 10:06

## 2025-06-20 ENCOUNTER — TELEPHONE (OUTPATIENT)
Dept: PULMONOLOGY | Facility: CLINIC | Age: 70
End: 2025-06-20
Payer: MEDICARE

## 2025-06-20 ENCOUNTER — RESULTS FOLLOW-UP (OUTPATIENT)
Dept: PULMONOLOGY | Facility: CLINIC | Age: 70
End: 2025-06-20

## 2025-06-20 NOTE — TELEPHONE ENCOUNTER
Spoke to patient regarding bronchoscopy scheduled for 6/27/25,  Will plan on robotic bronchoscopy to BLAS GGO and mediastinal staging.      Explained procedure over the phone.  Not on medications, no anesthesia complications.    Patient has had a negative stress and is being considered for surgical resection.    Will proceed with bronchoscopy without clinic visit prior.    Will obtain consent the day of the procedure.

## 2025-06-23 NOTE — PROGRESS NOTES
PATIENT: Renata Garcia  MRN: [id number]  DATE: 6/23/2025      Diagnosis:   1. 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 Chronic obstructive pulmonary disease, unspecified COPD type        Chief Complaint: New Patient  (Lung cancer )          Subjective:    Initial History: Ms. Garcia is a 69 y.o. female with  lung cancer     History of Present Illness    CHIEF COMPLAINT:  Patient presents today for consultation for NSCLC.    HPI: 69 y.o. female current smoker with COPD seen today in consultation for LLL NSCLC. She is followed by Pulmonology and  CT chest 3/10/25 shows  a spiculated solid nodule in the left LLL measuring 1.2 cm, previously 0.8 cm  BLAS  ground-glass nodule measuring 1.7 x 1.0 cm, previously 1.8 x 0.9 cm. PET 4/30/25 shows  with left lower lobe measuring 1.1 cm, max SUV 2.6, and a ground-glass nodule in the left upper lobe measuring 1.5 cm, max SUV 1.4. Percutaneous biopsy of LLL nodule returned invasive squamous cell carcinoma. Scheduled with pulmonology for EBUS consideration on 6/26/25.PFTs: FEV1 - 62%, DLCO - 66%  She is followed by CTS with plan for  left robotic assisted lower lobectomy versus segmentectomy with MLND, possible thoracotomy.   Patient reports dyspnea for approximately 1 year, primarily with exertion. Associated symptoms include mild cough and unintentional weight loss from 145 lbs 2 years ago to recent wt 125 lbs. She was diagnosed with COPD and lung nodules during a wellness exam last year. A 1 cm right upper lung nodule was identified on 07/15/2024, without immediate follow-up.  Smoking history is significant for tobacco use since age 14-15, with a reduction in consumption from 1 pack per day to 0.5 pack per day. A previous trial of for smoking cessation meds was discontinued due to significant side effects.  She denies dyspnea at rest, hemoptysis, chest pain, chest pressure, decreased appetite, headaches, nausea, or vomiting. She does not have any medical history  "or physician encounters prior to last year.        ROS:  General: -fever, -chills, -fatigue, -weight gain, +weight loss  Eyes: -vision changes, -redness, -discharge  ENT: -ear pain, -nasal congestion, -sore throat  Cardiovascular: -chest pain, +palpitations, -lower extremity edema  Respiratory: +cough, -shortness of breath, +exertional dyspnea  Gastrointestinal: -abdominal pain, -nausea, -vomiting, -diarrhea, -constipation, -blood in stool  Genitourinary: -dysuria, -hematuria, -frequency  Musculoskeletal: -joint pain, -muscle pain  Skin: -rash, -lesion  Neurological: -headache, -dizziness, -numbness, -tingling  Psychiatric: +anxiety, -depression, -sleep difficulty          Past Medical History: History reviewed. No pertinent past medical history.    Past Surgical HIstory:   Past Surgical History:   Procedure Laterality Date    COLONOSCOPY N/A 09/18/2024    Procedure: COLONOSCOPY;  Surgeon: Benoit Chin MD;  Location: [facility];  Service: Endoscopy;  Laterality: N/A;  8/21 ref  by LEANDRO HERNANDEZ, JANY, instr. to portal-st  9/10/24- lvm/portal for pc. [facility]  9/16 pt r/s, PEG, portal.efren    LASER ABLATION OF THE CERVIX         Family History:   Family History   Problem Relation Name Age of Onset    Cancer Sister      Hepatitis Sister      Cancer Maternal Uncle         Social History:  reports that she has been smoking cigarettes. She started smoking about 56 years ago. She has a 28.2 pack-year smoking history. She has been exposed to tobacco smoke. She uses smokeless tobacco. She reports current alcohol use. She reports that she does not use drugs.    Allergies:  Review of patient's allergies indicates:   Allergen Reactions    Codeine Nausea And Vomiting         ECOG Performance Status: 1   Objective:      Vitals:   Vitals:    06/03/25 0901   BP: (!) 142/70   Pulse: 69   Temp: 97.6 °F (36.4 °C)   SpO2: 97%   Weight: 56.8 kg (125 lb 3.5 oz)   Height: 5' 10" (1.778 m)         Physical Exam    General: No acute " distress. Well-developed. Well-nourished.  Eyes: EOMI. Sclerae anicteric.  HENT: Normocephalic. Atraumatic. Nares patent. Moist oral mucosa.  Cardiovascular: Regular rate. Regular rhythm. No murmurs.  Normal S1, S2.  Respiratory: Normal respiratory effort. Clear to auscultation bilaterally. No rales. No rhonchi. No wheezing.  Abdomen: Soft. Non-tender. Non-distended. Normoactive bowel sounds.  Musculoskeletal: No  obvious deformity.  Extremities: No lower extremity edema.  Neurological: Alert & oriented x3. No slurred speech. Normal gait.  Psychiatric: Normal mood. Normal affect. Good insight. Good judgment.  Skin: Warm. Dry. No rash.              Laboratory Data:  Hospital Outpatient Visit on 06/02/2025   Component Date Value Ref Range Status    Physician Comment 06/05/2025    Final                    Value:The reduced [id number] suggests possible obstruction. Spirometry remains unimproved following bronchodilator. Lung volume determination shows TLC is normal with evidence air trapping is present. DLCO is moderately decreased.   Â   Notes: The failure to demonstrate improvement in spirometry does not preclude a clinical response to a trial of bronchodilators. The isolated reduction in the FVC may be due to air trapping. DLCO interpretation is based upon the reported hemoglobin   level.      Post FVC 06/05/2025 2.34 (L)  2.61 - 4.54 L Final    Post FEV5 06/05/2025 1.33  1.16 - 2.88 L Final    Post FEV1 06/05/2025 1.65 (L)  2.00 - 3.42 L Final    Post FEV1 FVC 06/05/2025 70.31  64.63 - 88.68 % Final    Post FEF 25 75 06/05/2025 0.96 (L)  1.43 - 4.23 L/s Final    Post PEF 06/05/2025 5.20  4.71 - 8.81 L/s Final    Post  06/05/2025 6.52  sec Final    Pre DLCO 06/05/2025 14.35 (L)  19.56 - 31.03 ml/(min*mmHg) Final    DLCO ADJ PRE 06/05/2025 14.02 (L)  19.56 - 31.03 ml/(min*mmHg) Final    DLCOVA PRE 06/05/2025 3.36  3.06 - 5.45 ml/(min*mmHg*L) Final    DLVAAdj PRE 06/05/2025 3.29  3.06 - 5.45 ml/(min*mmHg*L) Final     VA PRE 06/05/2025 4.27 (L)  5.79 - 5.79 L Final    IVC PRE 06/05/2025 2.18 (L)  2.61 - 4.54 L Final    TLCN2 PRE 06/05/2025 5.46  4.96 - 6.93 L Final    VCMAXN2 PRE 06/05/2025 2.20 (L)  2.61 - 4.54 L Final    PRE IC N2 06/05/2025 2.09  -95972.37 - 73107.63 L Final    Pre FRC N2 06/05/2025 3.37  2.23 - 3.87 L Final    ERVN2 PRE 06/05/2025 0.11  -77281.26 - 97567.74 L Final    RVN2 PRE 06/05/2025 3.25 (H)  1.73 - 2.88 L Final    [id number] PRE 06/05/2025 59.65 (H)  32.49 - 51.67 % Final    Pre FVC 06/05/2025 2.09 (L)  2.61 - 4.54 L Final    PRE FEV5 06/05/2025 1.17  1.16 - 2.88 L Final    Pre FEV1 06/05/2025 1.48 (L)  2.00 - 3.42 L Final    Pre FEV1 FVC 06/05/2025 70.89  64.63 - 88.68 % Final    Pre FEF 25 75 06/05/2025 0.86 (L)  1.43 - 4.23 L/s Final    Pre PEF 06/05/2025 4.34 (L)  4.71 - 8.81 L/s Final    Pre  06/05/2025 6.26  sec Final    FVC Ref 06/05/2025 3.55   Final    FVC LLN 06/05/2025 2.61   Final    FVC Pre Ref 06/05/2025 58.8  % Final    FEV05 REF 06/05/2025 2.02   Final    FEV05 LLN 06/05/2025 1.16   Final    PRE FEV05 REF 06/05/2025 57.9  % Final    FEV1 Ref 06/05/2025 2.73   Final    FEV1 LLN 06/05/2025 2.00   Final    FEV1 Pre Ref 06/05/2025 54.3  % Final    FEV1 FVC Ref 06/05/2025 78   Final    FEV1 FVC LLN 06/05/2025 65   Final    FEV1 FVC Pre Ref 06/05/2025 91.4  % Final    FEF 25 75 Ref 06/05/2025 2.83   Final    FEF 25 75 LLN 06/05/2025 1.43   Final    FEF 25 75 Pre Ref 06/05/2025 30.5  % Final    PEF Ref 06/05/2025 6.76   Final    PEF LLN 06/05/2025 4.71   Final    PEF Pre Ref 06/05/2025 64.2  % Final    FVC Chg 06/05/2025 12.2  % Final    FEV1 Chg 06/05/2025 11.2  % Final    SGC270 Chg 06/05/2025 4.1  % Final    FVC VOL CHG 06/05/2025 0.25   Final    FEV1 VOL CHG 06/05/2025 0.17   Final    TLCN2 Ref 06/05/2025 5.94   Final    TLCN2 LLN 06/05/2025 4.96   Final    TLC N2 ULN 06/05/2025 6.93   Final    TLCN2 Pre Ref 06/05/2025 91.8  % Final    VCMAXN2 Ref 06/05/2025 3.55   Final    VCMAXN2  LLN 06/05/2025 2.61   Final    VCMAX N2 ULN 06/05/2025 4.54   Final    VCMAXN2 Pre Ref 06/05/2025 62.0  % Final    FIF5PCJ 06/05/2025 2.63   Final    LLN IC N2 06/05/2025 -43657.37   Final    ULN IC N2 06/05/2025 96432.63   Final    PRE REF IC N2 06/05/2025 79.5  % Final    FRCN2 Ref 06/05/2025 3.05   Final    FRCN2 LLN 06/05/2025 2.23   Final    FRC N2 ULN 06/05/2025 3.87   Final    FRCN2 Pre Ref 06/05/2025 110.3  % Final    ERVN2 Ref 06/05/2025 0.74   Final    ERVN2 LLN 06/05/2025 -46947.26   Final    ERV N2 ULN 06/05/2025 76478.74   Final    ERVN2 Pre Ref 06/05/2025 15.0  % Final    RVN2 Ref 06/05/2025 2.31   Final    RVN2 LLN 06/05/2025 1.73   Final    RV N2 ULN 06/05/2025 2.88   Final    RVN2 Pre Ref 06/05/2025 141.1  % Final    YFS6YRWY5 Ref 06/05/2025 42.08   Final    BYP8RVDB9 LLN 06/05/2025 32.49   Final    RV N2 TLC N2 ULN 06/05/2025 51.67   Final    SDU0GHHW6 Pre Ref 06/05/2025 141.7  % Final    DLCO Single Breath Ref 06/05/2025 25.30   Final    DLCO Single Breath LLN 06/05/2025 19.56   Final    DLCO SINGLEBREATH ULN 06/05/2025 31.03   Final    DLCO Single Breath Pre Ref 06/05/2025 56.7  % Final    DLCOc Single Breath Ref 06/05/2025 25.30   Final    DLCOc Single Breath LLN 06/05/2025 19.56   Final    DLCOC SINGLEBREATH ULN 06/05/2025 31.03   Final    DLCOc Single Breath Pre Ref 06/05/2025 55.4  % Final    DLCOVA Ref 06/05/2025 4.26   Final    DLCOVA LLN 06/05/2025 3.06   Final    DLCOVA ULN 06/05/2025 5.45   Final    DLCOVA Pre Ref 06/05/2025 79.0  % Final    DLCOc SBVA Ref 06/05/2025 4.26   Final    DLCOc SBVA LLN 06/05/2025 3.06   Final    DLCOCSBVA ULN 06/05/2025 5.45   Final    DLCOc SBVA Pre Ref 06/05/2025 77.2  % Final    VA Single Breath Ref 06/05/2025 5.79   Final    VA Single Breath LLN 06/05/2025 5.79   Final    VA SINGLEBREATH ULN 06/05/2025 5.79   Final    VA Single Breath Pre Ref 06/05/2025 73.6  % Final    IVC Single Breath Ref 06/05/2025 3.55   Final    IVC Single Breath LLN 06/05/2025 2.61    Final    IVC SINGLEBREATH ULN 06/05/2025 4.54   Final    IVC Single Breath Pre Ref 06/05/2025 61.4  % Final    DLCOSINGLEBREATHZSCORE 06/05/2025 -3.14   Final    DLCOcSingleBreathZSCORE 06/05/2025 -3.24   Final   Lab Visit on 06/02/2025   Component Date Value Ref Range Status    Sodium 06/02/2025 142  136 - 145 mmol/L Final    Potassium 06/02/2025 4.7  3.5 - 5.1 mmol/L Final    Chloride 06/02/2025 107  95 - 110 mmol/L Final    CO2 06/02/2025 26  23 - 29 mmol/L Final    Glucose 06/02/2025 104  70 - 110 mg/dL Final    BUN 06/02/2025 15  8 - 23 mg/dL Final    Creatinine 06/02/2025 0.7  0.5 - 1.4 mg/dL Final    Calcium 06/02/2025 9.1  8.7 - 10.5 mg/dL Final    Protein Total 06/02/2025 7.0  6.0 - 8.4 gm/dL Final    Albumin 06/02/2025 4.1  3.5 - 5.2 g/dL Final    Bilirubin Total 06/02/2025 0.4  0.1 - 1.0 mg/dL Final    For infants and newborns, interpretation of results should be based   on gestational age, weight and in agreement with clinical   observations.    Premature Infant recommended reference ranges:   0-24 hours:  <8.0 mg/dL   24-48 hours: <12.0 mg/dL   3-5 days:    <15.0 mg/dL   6-29 days:   <15.0 mg/dL    ALP 06/02/2025 73  40 - 150 unit/L Final    AST 06/02/2025 14  11 - 45 unit/L Final    ALT 06/02/2025 13  10 - 44 unit/L Final    Anion Gap 06/02/2025 9  8 - 16 mmol/L Final    eGFR 06/02/2025 >60  >60 mL/min/1.73/m2 Final    Estimated GFR calculated using the CKD-EPI creatinine (2021) equation.    PT 06/02/2025 11.3  9.0 - 12.5 seconds Final    INR 06/02/2025 1.0  0.8 - 1.2 Final    Coumadin Therapy:    2.0 - 3.0 for INR for all indicators except mechanical heart valves    and antiphospholipid syndromes which should use 2.5 - 3.5.    PTT 06/02/2025 24.7  21.0 - 32.0 seconds Final    Refer to local heparin nomogram for intensity/dose specific therapeutic range.    Prealbumin 06/02/2025 20  20 - 43 mg/dL Final    WBC 06/02/2025 8.87  3.90 - 12.70 K/uL Final    RBC 06/02/2025 4.81  4.00 - 5.40 M/uL Final     HGB 06/02/2025 14.1  12.0 - 16.0 gm/dL Final    HCT 06/02/2025 43.8  37.0 - 48.5 % Final    MCV 06/02/2025 91  82 - 98 fL Final    MCH 06/02/2025 29.3  27.0 - 31.0 pg Final    MCHC 06/02/2025 32.2  32.0 - 36.0 g/dL Final    RDW 06/02/2025 12.9  11.5 - 14.5 % Final    Platelet Count 06/02/2025 217  150 - 450 K/uL Final    MPV 06/02/2025 11.1  9.2 - 12.9 fL Final    Nucleated RBC 06/02/2025 0  <=0 /100 WBC Final    Neut % 06/02/2025 60.3  38 - 73 % Final    Lymph % 06/02/2025 30.3  18 - 48 % Final    Mono % 06/02/2025 6.5  4 - 15 % Final    Eos % 06/02/2025 1.9  <=8 % Final    Basophil % 06/02/2025 0.7  <=1.9 % Final    Imm Grans % 06/02/2025 0.3  0.0 - 0.5 % Final    Neut # 06/02/2025 5.34  1.8 - 7.7 K/uL Final    Lymph # 06/02/2025 2.69  1 - 4.8 K/uL Final    Mono # 06/02/2025 0.58  0.3 - 1 K/uL Final    Eos # 06/02/2025 0.17  <=0.5 K/uL Final    Baso # 06/02/2025 0.06  <=0.2 K/uL Final    Imm Grans # 06/02/2025 0.03  0.00 - 0.04 K/uL Final    Mild elevation in immature granulocytes is non specific and can be seen in a variety of conditions including stress response, acute inflammation, trauma and pregnancy. Correlation with other laboratory and clinical findings is essential.         Imaging: CT CHEST WITHOUT CONTRAST 3/10/25      CLINICAL HISTORY:  Lung nodule, > 8mm;  Solitary pulmonary nodule     TECHNIQUE:  CT chest without intravenous contrast.  Coronal and sagittal reformats provided.     COMPARISON:  07/15/2024     FINDINGS:  No pericardial or pleural effusion.  No mediastinal lymphadenopathy.  Noncontrast evaluation of hilar contours is unremarkable.  Ascending aorta measures 3.8 cm.  Heart is normal size.  Mild aortic calcification noted.     There is biapical scarring and bullous change, overall similar to prior study.  There is a spiculated solid nodule in the left lower lobe measuring 1.2 cm, previously 0.8 cm (series 4, image 212).  There is a left upper lobe ground-glass nodule measuring 1.7 x 1.0 cm,  previously 1.8 x 0.9 cm (series 4, image 239), which exhibits similar density with no new solid component.  Several additional subpleural micro nodules noted bilaterally.  Central airways are patent, without endobronchial lesions.     Limited evaluation of the upper abdomen is unremarkable.     Regional osseous structures demonstrate no aggressive appearing lytic or blastic lesions.     Impression:     Interval enlargement of spiculated solid pulmonary nodule in the left lower lobe.  Recommend pulmonary medicine consultation.  Consider further evaluation with PET-CT or histologic sampling.     Stable appearance of ground-glass nodule in the left upper lobe.  Continued annual surveillance recommended.         NM PET CT FDG SKULL BASE TO MID THIGH 4/30/25     CLINICAL HISTORY:  Lung nodule, > 8mm; Solitary pulmonary nodule     TECHNIQUE:  12.12 mCi of F18-FDG was administered intravenously in the left antecubital fossa.  After an approximately 60 min distribution time, PET/CT images were acquired from the skull base to mid-thigh. Transmission images were acquired to correct for attenuation using a whole body low-dose CT scan without IV contrast with the arms positioned above the head. Glycemia at the time of injection was 96 mg/dL.     COMPARISON:  No priors.     FINDINGS:  Quality of the study: Adequate.     In the head and neck, there are no hypermetabolic lesions worrisome for malignancy. There are no hypermetabolic mucosal lesions, and there are no pathologically enlarged or hypermetabolic lymph nodes.     In the chest, there is a hypermetabolic pulmonary nodule in the left lower lobe measuring 0.9 x 1.1 cm (image 102) with max SUV measuring 2.6.  Additional ground-glass nodule in the left upper lobe measuring 1.5 x 1.4 cm (image 108) with max SUV measuring 1.4.     In the abdomen and pelvis, there is physiologic tracer distribution within the abdominal organs and excretion into the genitourinary system.     In  the bones, there are no hypermetabolic lesions worrisome for malignancy.     In the extremities, there are no hypermetabolic lesions worrisome for malignancy.     Additional CT findings: Biapical pleuroparenchymal scarring.  Non hypermetabolic solid pulmonary nodule in the right upper lobe measuring 5 mm (image 78).  Patchy calcific atherosclerosis throughout the coronary arteries.  Colonic diverticulosis.  Degenerative changes of the spine.     Impression:     1. Hypermetabolic main pulmonary nodule LLL, and other as detailed above.  Attention to follow with tissue diagnosis     Electronically signed by resident: Sven Seaman  Date:                                            04/30/2025  Time:                                           13:49     Electronically signed by:Blanca Sweeney  Date:                                            04/30/2025      Pathology  5/14/25     Final Diagnosis   Lung, left lower lobe, biopsy:   Invasive squamous cell carcinoma, nonkeratinizing  [facility] NGS and PDL1 testing have been ordered on block 1B.   Electronically signed by Lynsey Esparza DO on 5/20/2025 at 1212   Microscopic Description    Immunohistochemical stain results in the tumor cells are as follows: AE1/AE3-positive, P40-positive, TTF 1-negative.  Stains adequately controlled.     PFTs 6/2/25  FEV1 - 1.48    54.3%  DLCO - 14.35   56.7%     Assessment:       1. Squamous cell carcinoma of lower lobe of left lung        2. Chronic obstructive pulmonary disease, unspecified COPD type             Assessment & Plan    IMPRESSION:  Squamous Cell CA of LLL   5/14/25 S/p Percutaneous biopsy of LLL nodule  path pos for invasive squamous cell carcinoma   NGS testing-tissue  PD-L1 positive TPS 14%   MAURICIO  TMB low   No potentially actionable variants and no reportable treatment options   Follow up on NGS ctDNA testing   PFTs: FEV1 - 62%, DLCO - 66%    EBUS  for mediastinal staging planned 6/26/25   Followed by CTS for possible  surgical intervention involving  left robotic assisted lower lobectomy versus segmentectomy with MLND, possible thoracotomy.    Pt presented at tumor board and plan as above and monitor angelica ggo with serial imaging.  Await workup    Follow up 2 weeks post op   Follow up on [facility] ctDNA ( ordered 5/23/25)      COPD   Stable         NICOTINE DEPENDENCE:  - Discussed  that smoking cessation is crucial for improving overall health and potentially impacting cancer treatment outcomes.  -    FOLLOW-UP:  - Follow up 2 weeks post-op to review biopsy results.  - Contact the office to schedule follow up after surgery is completed.           Jailyn Santiago MD  [facility]-  Hematology and Oncology  [address] , [facility] [address]  [place] [address]  O: [phone] F: [phone]    This note was generated with the assistance of ambient listening technology. Verbal consent was obtained by the patient and accompanying visitor(s) for the recording of patient appointment to facilitate this note. I attest to having reviewed and edited the generated note for accuracy, though some syntax or spelling errors may persist. Please contact the author of this note for any clarification.

## 2025-06-26 ENCOUNTER — ANESTHESIA EVENT (OUTPATIENT)
Dept: SURGERY | Facility: HOSPITAL | Age: 70
End: 2025-06-26
Payer: MEDICARE

## 2025-06-27 ENCOUNTER — HOSPITAL ENCOUNTER (OUTPATIENT)
Facility: HOSPITAL | Age: 70
Discharge: HOME | End: 2025-06-27
Attending: INTERNAL MEDICINE | Admitting: INTERNAL MEDICINE
Payer: MEDICARE

## 2025-06-27 ENCOUNTER — TELEPHONE (OUTPATIENT)
Dept: PULMONOLOGY | Facility: HOSPITAL | Age: 70
End: 2025-06-27
Payer: MEDICARE

## 2025-06-27 ENCOUNTER — ANESTHESIA (OUTPATIENT)
Dept: SURGERY | Facility: HOSPITAL | Age: 70
End: 2025-06-27
Payer: MEDICARE

## 2025-06-27 ENCOUNTER — RESULTS FOLLOW-UP (OUTPATIENT)
Dept: PULMONOLOGY | Facility: HOSPITAL | Age: 70
End: 2025-06-27

## 2025-06-27 VITALS
TEMPERATURE: 97 F | BODY MASS INDEX: 17.9 KG/M2 | SYSTOLIC BLOOD PRESSURE: 142 MMHG | OXYGEN SATURATION: 98 % | RESPIRATION RATE: 20 BRPM | WEIGHT: 125 LBS | HEART RATE: 56 BPM | HEIGHT: 70 IN | DIASTOLIC BLOOD PRESSURE: 64 MMHG

## 2025-06-27 DIAGNOSIS — C34.92 SQUAMOUS CELL LUNG CANCER, LEFT: ICD-10-CM

## 2025-06-27 DIAGNOSIS — R91.8 PULMONARY NODULES: Primary | ICD-10-CM

## 2025-06-27 DIAGNOSIS — I47.11 INAPPROPRIATE SINUS TACHYCARDIA: Primary | ICD-10-CM

## 2025-06-27 PROCEDURE — 36000709 HC OR TIME LEV III EA ADD 15 MIN: Performed by: INTERNAL MEDICINE

## 2025-06-27 PROCEDURE — 71000015 HC POSTOP RECOV 1ST HR: Performed by: INTERNAL MEDICINE

## 2025-06-27 PROCEDURE — 31624 DX BRONCHOSCOPE/LAVAGE: CPT | Mod: 51,,, | Performed by: INTERNAL MEDICINE

## 2025-06-27 PROCEDURE — 63600175 PHARM REV CODE 636 W HCPCS

## 2025-06-27 PROCEDURE — 88173 CYTOPATH EVAL FNA REPORT: CPT | Mod: TC,91 | Performed by: INTERNAL MEDICINE

## 2025-06-27 PROCEDURE — 31627 NAVIGATIONAL BRONCHOSCOPY: CPT | Mod: ,,, | Performed by: INTERNAL MEDICINE

## 2025-06-27 PROCEDURE — 31652 BRONCH EBUS SAMPLNG 1/2 NODE: CPT | Mod: ,,, | Performed by: INTERNAL MEDICINE

## 2025-06-27 PROCEDURE — 37000009 HC ANESTHESIA EA ADD 15 MINS: Performed by: INTERNAL MEDICINE

## 2025-06-27 PROCEDURE — 71000044 HC DOSC ROUTINE RECOVERY FIRST HOUR: Performed by: INTERNAL MEDICINE

## 2025-06-27 PROCEDURE — 31654 BRONCH EBUS IVNTJ PERPH LES: CPT | Mod: ,,, | Performed by: INTERNAL MEDICINE

## 2025-06-27 PROCEDURE — 36000708 HC OR TIME LEV III 1ST 15 MIN: Performed by: INTERNAL MEDICINE

## 2025-06-27 PROCEDURE — 31629 BRONCHOSCOPY/NEEDLE BX EACH: CPT | Mod: 51,,, | Performed by: INTERNAL MEDICINE

## 2025-06-27 PROCEDURE — 37000008 HC ANESTHESIA 1ST 15 MINUTES: Performed by: INTERNAL MEDICINE

## 2025-06-27 PROCEDURE — 27201423 OPTIME MED/SURG SUP & DEVICES STERILE SUPPLY: Performed by: INTERNAL MEDICINE

## 2025-06-27 PROCEDURE — 25000003 PHARM REV CODE 250

## 2025-06-27 PROCEDURE — 31628 BRONCHOSCOPY/LUNG BX EACH: CPT | Mod: 51,,, | Performed by: INTERNAL MEDICINE

## 2025-06-27 RX ORDER — PHENYLEPHRINE HYDROCHLORIDE 10 MG/ML
INJECTION INTRAVENOUS
Status: DISCONTINUED | OUTPATIENT
Start: 2025-06-27 | End: 2025-06-27

## 2025-06-27 RX ORDER — PROPOFOL 10 MG/ML
VIAL (ML) INTRAVENOUS
Status: DISCONTINUED | OUTPATIENT
Start: 2025-06-27 | End: 2025-06-27

## 2025-06-27 RX ORDER — LIDOCAINE HYDROCHLORIDE 10 MG/ML
INJECTION, SOLUTION INTRAVENOUS
Status: DISCONTINUED | OUTPATIENT
Start: 2025-06-27 | End: 2025-06-27

## 2025-06-27 RX ORDER — ACETAMINOPHEN 10 MG/ML
INJECTION, SOLUTION INTRAVENOUS
Status: DISCONTINUED | OUTPATIENT
Start: 2025-06-27 | End: 2025-06-27

## 2025-06-27 RX ORDER — ONDANSETRON HYDROCHLORIDE 2 MG/ML
INJECTION, SOLUTION INTRAVENOUS
Status: DISCONTINUED | OUTPATIENT
Start: 2025-06-27 | End: 2025-06-27

## 2025-06-27 RX ORDER — GLUCAGON 1 MG
1 KIT INJECTION
Status: DISCONTINUED | OUTPATIENT
Start: 2025-06-27 | End: 2025-06-27 | Stop reason: HOSPADM

## 2025-06-27 RX ORDER — FENTANYL CITRATE 50 UG/ML
25 INJECTION, SOLUTION INTRAMUSCULAR; INTRAVENOUS EVERY 5 MIN PRN
Status: DISCONTINUED | OUTPATIENT
Start: 2025-06-27 | End: 2025-06-27 | Stop reason: HOSPADM

## 2025-06-27 RX ORDER — DEXAMETHASONE SODIUM PHOSPHATE 4 MG/ML
INJECTION, SOLUTION INTRA-ARTICULAR; INTRALESIONAL; INTRAMUSCULAR; INTRAVENOUS; SOFT TISSUE
Status: DISCONTINUED | OUTPATIENT
Start: 2025-06-27 | End: 2025-06-27

## 2025-06-27 RX ORDER — FENTANYL CITRATE 50 UG/ML
INJECTION, SOLUTION INTRAMUSCULAR; INTRAVENOUS
Status: DISCONTINUED | OUTPATIENT
Start: 2025-06-27 | End: 2025-06-27

## 2025-06-27 RX ORDER — ROCURONIUM BROMIDE 10 MG/ML
INJECTION, SOLUTION INTRAVENOUS
Status: DISCONTINUED | OUTPATIENT
Start: 2025-06-27 | End: 2025-06-27

## 2025-06-27 RX ORDER — HALOPERIDOL LACTATE 5 MG/ML
0.5 INJECTION, SOLUTION INTRAMUSCULAR EVERY 10 MIN PRN
Status: DISCONTINUED | OUTPATIENT
Start: 2025-06-27 | End: 2025-06-27 | Stop reason: HOSPADM

## 2025-06-27 RX ORDER — OXYCODONE HYDROCHLORIDE 5 MG/1
5 TABLET ORAL
Status: DISCONTINUED | OUTPATIENT
Start: 2025-06-27 | End: 2025-06-27 | Stop reason: HOSPADM

## 2025-06-27 RX ADMIN — PROPOFOL 130 MG: 10 INJECTION, EMULSION INTRAVENOUS at 11:06

## 2025-06-27 RX ADMIN — PHENYLEPHRINE HYDROCHLORIDE 100 MCG: 10 INJECTION INTRAVENOUS at 11:06

## 2025-06-27 RX ADMIN — ACETAMINOPHEN 1000 MG: 10 INJECTION, SOLUTION INTRAVENOUS at 11:06

## 2025-06-27 RX ADMIN — SUGAMMADEX 200 MG: 100 INJECTION, SOLUTION INTRAVENOUS at 12:06

## 2025-06-27 RX ADMIN — PHENYLEPHRINE HYDROCHLORIDE 100 MCG: 10 INJECTION INTRAVENOUS at 12:06

## 2025-06-27 RX ADMIN — PROPOFOL 150 MCG/KG/MIN: 10 INJECTION, EMULSION INTRAVENOUS at 11:06

## 2025-06-27 RX ADMIN — LIDOCAINE HYDROCHLORIDE 100 MG: 10 INJECTION, SOLUTION INTRAVENOUS at 11:06

## 2025-06-27 RX ADMIN — SODIUM CHLORIDE: 9 INJECTION, SOLUTION INTRAVENOUS at 11:06

## 2025-06-27 RX ADMIN — GLYCOPYRROLATE 0.2 MG: 0.2 INJECTION, SOLUTION INTRAMUSCULAR; INTRAVENOUS at 12:06

## 2025-06-27 RX ADMIN — DEXAMETHASONE SODIUM PHOSPHATE 8 MG: 4 INJECTION, SOLUTION INTRAMUSCULAR; INTRAVENOUS at 11:06

## 2025-06-27 RX ADMIN — FENTANYL CITRATE 100 MCG: 50 INJECTION, SOLUTION INTRAMUSCULAR; INTRAVENOUS at 11:06

## 2025-06-27 RX ADMIN — ONDANSETRON 4 MG: 2 INJECTION INTRAMUSCULAR; INTRAVENOUS at 12:06

## 2025-06-27 RX ADMIN — ROCURONIUM BROMIDE 50 MG: 10 INJECTION, SOLUTION INTRAVENOUS at 11:06

## 2025-06-27 NOTE — ANESTHESIA PROCEDURE NOTES
Intubation    Date/Time: 6/27/2025 11:38 AM    Performed by: Ashley Hutchinson MD  Authorized by: Yolanda Parker MD    Intubation:     Induction:  Intravenous    Intubated:  Postinduction    Mask Ventilation:  Easy with oral airway    Attempts:  2    Attempted By:  Resident anesthesiologist    Method of Intubation:  Video laryngoscopy    Blade:  Sergei 3    Laryngeal View Grade: Grade IIb - only the arytenoids and epiglottis seen      Attempted By (2nd Attempt):  Resident anesthesiologist    Method of Intubation (2nd Attempt):  Video laryngoscopy    Blade (2nd Attempt):  Troncoso 3    Laryngeal View Grade (2nd Attempt): Grade I - full view of cords      Difficult Airway Encountered?: No      Complications:  None    Airway Device:  Oral endotracheal tube    Airway Device Size:  8.5    Style/Cuff Inflation:  Cuffed (inflated to minimal occlusive pressure)    Tube secured:  22    Secured at:  The teeth    Placement Verified By:  Capnometry    Complicating Factors:  Anterior larynx    Findings Post-Intubation:  BS equal bilateral and atraumatic/condition of teeth unchanged

## 2025-06-27 NOTE — ANESTHESIA POSTPROCEDURE EVALUATION
Anesthesia Post Evaluation    Patient: Renata Garcia    Procedure(s) Performed: Procedure(s) (LRB):  ROBOTIC BRONCHOSCOPY (N/A)    Final Anesthesia Type: general      Patient location during evaluation: [facility]  Patient participation: Yes- Able to Participate  Level of consciousness: awake and alert  Post-procedure vital signs: reviewed and stable  Pain management: adequate  Airway patency: patent  MAURI mitigation strategies: Multimodal analgesia  PONV status at discharge: No PONV  Anesthetic complications: no      Cardiovascular status: blood pressure returned to baseline, hemodynamically stable and stable  Respiratory status: unassisted, room air and spontaneous ventilation  Hydration status: euvolemic  Follow-up not needed.              Vitals Value Taken Time   /77 06/27/25 13:02   Temp 36 °C (96.8 °F) 06/27/25 12:53   Pulse 86 06/27/25 13:03   Resp 22 06/27/25 13:03   SpO2 99 % 06/27/25 13:03   Vitals shown include unfiled device data.      No case tracking events are documented in the log.      Pain/Fely Score: No data recorded

## 2025-06-27 NOTE — TELEPHONE ENCOUNTER
During procedure and post operatively, patient had seconds runs of sinus tachycardia with HR up to 120 bpm.  BP remained stable and then returns to baseline within 30-60 seconds.  Patient states she has had palpitations like this for years.  Stress test is negative but recommend EP or cards evaluation if surgery is still considered an option.  Patient and  are aware of referral.

## 2025-06-27 NOTE — TRANSFER OF CARE
"Anesthesia Transfer of Care Note    Patient: Renata Garcia    Procedure(s) Performed: Procedure(s) (LRB):  ROBOTIC BRONCHOSCOPY (N/A)    Patient location: [facility]    Anesthesia Type: general    Transport from OR: Transported from OR on 6-10 L/min O2 by face mask with adequate spontaneous ventilation    Post pain: adequate analgesia    Post assessment: no apparent anesthetic complications    Post vital signs: stable    Level of consciousness: awake and alert    Nausea/Vomiting: no nausea/vomiting    Complications: none    Transfer of care protocol was followed      Last vitals: Visit Vitals  /65   Pulse 67   Temp 36.3 °C (97.3 °F) (Oral)   Resp 18   Ht 5' 10" (1.778 m)   Wt 56.7 kg (125 lb)   LMP  (LMP Unknown)   SpO2 97%   Breastfeeding No   BMI 17.94 kg/m²     "

## 2025-06-27 NOTE — H&P
HISTORY OF PRESENT ILLNESS: Renata Garcia is a 69 y.o. female  has no past medical history on file.  Patient was noted to have 1 cm rul nodule on 7/15/24.  Repeat ct 3/10/25 noted new lll at 1 cm and enlarging lll ggo.  Patient was referred to pulmonary for further inputs.     Patient with intermittent cough.  No fever/chills.  No hemoptysis.  No weight loss.  Active caring for grandchildren.  No chest pain. No orthopnea.  No pnd.  No h/o prior pneumonia     Additional Pulmonary History:   Occupational/Environmental Exposures:  retire  after 45 years  Exposure to Animals/Pets:  dogs and cats   Foreign Travel History:  nonce since 1979  History of exposures to TB:  denied   Family History of Lung Cancer:  denied   Tobacco:  3/4 ppd; used to smoke 1 ppd since teenage years  Childhood history of Lung Disease:  denied  Chest surgery or trauma:  denied        PAST MEDICAL HISTORY:         Active Ambulatory Problems     Diagnosis Date Noted    Palpitations 05/05/2015    Abnormal EKG 05/05/2015    Chest pain, atypical 05/05/2015    Chronic obstructive pulmonary disease, unspecified COPD type 04/24/2025    Pulmonary nodules 04/24/2025    Tobacco abuse 04/24/2025           Resolved Ambulatory Problems     Diagnosis Date Noted    No Resolved Ambulatory Problems      No Additional Past Medical History                  PAST SURGICAL HISTORY:          Past Surgical History:   Procedure Laterality Date    COLONOSCOPY N/A 9/18/2024     Procedure: COLONOSCOPY;  Surgeon: Benoit Chin MD;  Location: [facility];  Service: Endoscopy;  Laterality: N/A;  8/21 ref  by LEANDRO HERNANDEZ, PEG, instr. to portal-st  9/10/24- lvm/portal for pc. DBM  9/16 pt r/s, PEG, portal.efren            FAMILY HISTORY:                       Family History   Problem Relation Name Age of Onset    Cancer Sister        Hepatitis Sister        Cancer Maternal Uncle             SOCIAL HISTORY:          Tobacco: [Tobacco Use History]    [Tobacco Use  "History]       Tobacco Use   Smoking Status Every Day    Current packs/day: 0.50    Average packs/day: 0.5 packs/day for 56.3 years (28.2 ttl pk-yrs)    Types: Cigarettes    Start date: 1969   Smokeless Tobacco Never     alcohol use:    Social History           Substance and Sexual Activity   Alcohol Use Yes     Comment: occ                     ALLERGIES:         Review of patient's allergies indicates:   Allergen Reactions    Codeine Nausea And Vomiting         CURRENT MEDICATIONS:  [Current Medications]               [Current Medications]         Current Outpatient Medications   Medication Sig Dispense Refill    albuterol (PROVENTIL/VENTOLIN HFA) 90 mcg/actuation inhaler Inhale 1-2 puffs into the lungs every 4 to 6 hours as needed for Wheezing or Shortness of Breath (chest tightness). (Patient not taking: Reported on 7/29/2024) 18 g 11    ergocalciferol (ERGOCALCIFEROL) 50,000 unit Cap Take 1 capsule (50,000 Units total) by mouth every Sunday. (Patient not taking: Reported on 4/24/2025) 12 capsule 3      No current facility-administered medications for this visit.        REVIEW OF SYSTEMS:      Pulmonary related symptoms as per HPI.  Gen:  no weight loss, no fever, no night sweat  HEENT:  no visual changes, no sore throat, no hearing loss  CV:  per hpi  GI:  no melena, no hematochezia, no diarhea, no constipation.  :  no dysuria, no hematuria, no hesistancy, no dribbling  Neuro:  no syncope, no vertigo, no tinitus  Psych:  No homocide or suicide ideation; no depression.  Endocrine:  No heat or cold intolerance.  Sleep:  + snoring; no witnessed apnea.  Rested upon awake.    Otherwise, a balance of systems reviewed is negative.           PHYSICAL EXAM:      Vitals:     04/24/25 0929   BP: 127/77   Pulse: 78   SpO2: 96%   Weight: 56.4 kg (124 lb 7.2 oz)   Height: 5' 10" (1.778 m)   PainSc: 0-No pain      Body mass index is 17.86 kg/m².      GENERAL:  well develop; no apparent distress  HEENT:  no nasal congestion; " no discharge noted; class 3 modified mallampatti.   NECK:  supple; no palpable masses.  CARDIO: regular rate and rhythm  PULM:  clear to auscultation bilaterally; no intercostals retractions; no accessory muscle usage   ABDOMEN:  soft nontender/nondistended.  +bowel sound  EXTREMITIES no cce  NEURO:  CN II-XII intact.  5/5 motor in all extremities.  sensation grossly intact   to light touch.  PSYCH:  normal affect.  Alert and oriented x 4     LABS  Pulmonary Functions Testing Results(personally reviewed):    PFT 7/15/24 Ratio of 67%; FVC 2.58 L (72%); FEV1 1.73 L (63%); TLC 5.4 L (91%); dlco 16.8 (66%)   ABG (personally reviewed):  none  CXR (personally reviewed):  none  CT CHEST(personally reviewed):  3/10/25 scattered stranding and nodular pattern in both apex (Rt>Lt).  +bullous changes in rul with bronchiectasis.  When compared to 7/15/24, there is new spiculated 1.2 cm nodule lll (4:212) and Ill defined ggo angelica (4:236)     ASSESSMENT/PLAN  Problem List Items Addressed This Visit         Chronic obstructive pulmonary disease, unspecified COPD type - Primary     Overview   bullous/emphysematous changes noted on ct scan.    Fev 63%.    Encourage smoking cessation.  Deferred inhalers.    Patient stated  has nebulizer along with albuterol and hypertonic saline.  She will used her  nebs.             Pulmonary nodules     Overview   scattered nodular infiltrated noted bilaterally.  Repeat ct 3/2025 with enlarging nodule in lll and ggo in angelica.  Ddx: infection vs malignancy.  Pet scan to help with decision of ct guided biopsy vs serial monitoring.  Sputum afb.  Quantiferon.             Tobacco abuse     Overview   No ready to quit.              Other Visit Diagnoses           Pulmonary nodule         Relevant Orders     AFB Culture & Smear     Quantiferon Gold TB     NM PET CT FDG Skull Base to Mid Thigh                  CC: Send copy of this note to Holly Cotto, DO          Still with mild sob.  LLL  squamous cell carcinoma and BLAS GGO,.  Plan for diagnostic and staging robotic bronchoscopy with EBUS today.      Stress echo was negative.      I have explained the risks, benefits and alternatives of the procedure in detail.  The patient voices understanding and all questions have been answered.  The patient agrees to proceed as planned.

## 2025-06-27 NOTE — PLAN OF CARE
Recovery care complete. Discharge instructions given to pt and family. Pt and family verbalized understanding of all instructions. Vital signs stable. Pt is in no acute distress at this time. CXR read by Dr. Yoon, ready for d/c per MD. PIV removed. PO fluids given and tolerated well. Discharge home to self care with spouse.

## 2025-06-27 NOTE — DISCHARGE SUMMARY
[facility] - [facility] (2nd Fl)  Discharge Note  Short Stay    Procedure(s) (LRB):  ROBOTIC BRONCHOSCOPY (N/A)  FNA BLAS nodule  Transbronchial biopsies BLAS nodule  EBUS-TBNA Station 7    OUTCOME: Patient tolerated treatment/procedure well without complication and is now ready for discharge.    DISPOSITION: Home or Self Care    FINAL DIAGNOSIS:  BLAS nodule. Mediastinal adenopathy. NSCLC (SCC) of LLL    FOLLOWUP: Call with results    DISCHARGE INSTRUCTIONS:    Discharge Procedure Orders   Diet general     Call MD for:  temperature >101     Call MD for:  coughing up blood greater than 3 tablespoons in volume     Call MD for:  chest pain     Call MD for:  difficulty breathing or shortness of breath        TIME SPENT ON DISCHARGE: 15 minutes    Soren Jon DO  12:47 PM 06/27/2025

## 2025-06-27 NOTE — NURSING
HR irregular, will go as high as 120s and as low as 50s. Pt is asymptomatic and states she has palpitations at home occasionally. Notified Anesthesia MD Parker and MD Yoon. MD Yoon at bedside and said HR was doing same thing in procedure. MD suggests follow up with cardiology but okay to discharge since this is baseline. MD Parker okay with discharge as well.

## 2025-06-27 NOTE — ANESTHESIA PREPROCEDURE EVALUATION
[facility]  Anesthesia Pre-Operative Evaluation         Patient Name: Renata Garcia  YOB: 1955  MRN: [id number]    SUBJECTIVE:     Pre-operative evaluation for Procedure(s) (LRB):  ROBOTIC BRONCHOSCOPY (N/A)     06/26/2025    Renata Garcia is a 69 y.o. female w/ a significant PMHx of COPD, left lung SCC, tobacco use (1/2 ppd).    Now presenting for the above procedure(s).     Exercise stress echo:  6/2/2025    Normal myocardial perfusion scan. There is no evidence of myocardial ischemia or infarction.    There is a mild intensity fixed perfusion abnormality in the inferior wall of the left ventricle secondary to diaphragm attenuation.    The gated perfusion images showed an ejection fraction of 70% at rest. The gated perfusion images showed an ejection fraction of 70% post stress. Normal ejection fraction is greater than 53%.    There is normal wall motion at rest and post-stress.    LV cavity size is normal at rest and normal at post-stress.    The ECG portion of the study is negative for ischemia.    The patient reported chest pain during the stress test.    There were no arrhythmias during stress.    There is no prior study for comparison.      SCOTT:  No results found for this or any previous visit.    Prev airway: none documented      Problem List[1]    Review of patient's allergies indicates:   Allergen Reactions    Codeine Nausea And Vomiting       Current Inpatient Medications:       No current outpatient medications    Surgical History  Past Surgical History:   Procedure Laterality Date    COLONOSCOPY N/A 09/18/2024    Procedure: COLONOSCOPY;  Surgeon: Benoit Chin MD;  Location: [facility];  Service: Endoscopy;  Laterality: N/A;  8/21 ref  by LEANDRO HERNANDEZ, JANY, instr. to portal-st  9/10/24- lvm/portal for pc. DBM  9/16 pt r/s, PEG, portal.efren    LASER ABLATION OF THE CERVIX         Social History:  Tobacco Use: High Risk (6/3/2025)    Patient History     Smoking  Tobacco Use: Every Day     Smokeless Tobacco Use: Current     Passive Exposure: Current     Alcohol Use: Not At Risk (4/23/2025)    AUDIT-C     Frequency of Alcohol Consumption: Monthly or less     Average Number of Drinks: 3 or 4     Frequency of Binge Drinking: Never         OBJECTIVE:     Vital Signs Range (Last 24H):         Significant Labs:  Lab Results   Component Value Date    WBC 8.87 06/02/2025    HGB 14.1 06/02/2025    HCT 43.8 06/02/2025     06/02/2025    INR 1.0 06/02/2025       Lab Results   Component Value Date     06/02/2025    K 4.7 06/02/2025     06/02/2025    CREATININE 0.7 06/02/2025    BUN 15 06/02/2025    CO2 26 06/02/2025       Lab Results   Component Value Date    TSH 1.370 04/15/2015    HGBA1C 5.2 07/13/2024       EKG:   Results for orders placed or performed in visit on 05/05/15   IN OFFICE EKG 12-LEAD (to [place])    Collection Time: 05/05/15  3:35 PM    Narrative    Test Reason : 785.1  Vent. Rate : 086 BPM     Atrial Rate : 086 BPM     P-R Int : 154 ms          QRS Dur : 100 ms      QT Int : 366 ms       P-R-T Axes : 079 053 058 degrees     QTc Int : 437 ms    Normal sinus rhythm  Possible Left atrial enlargement  Incomplete right bundle branch block  Borderline Abnormal ECG  When compared with ECG of 14-APR-2015 11:01,  No significant change was found  Confirmed by Erik Dillard MD ([id number]) on 5/7/2015 10:17:12 AM    Referred By: BRITTANY LAURENT           Confirmed By:Erik Dillard MD       ASSESSMENT/PLAN:       Pre-op Assessment    I have reviewed the Patient Summary Reports.     I have reviewed the Nursing Notes. I have reviewed the NPO Status.   I have reviewed the Medications.     Review of Systems  Anesthesia Hx:  No problems with previous Anesthesia   History of prior surgery of interest to airway management or planning:          Denies Family Hx of Anesthesia complications.    Denies Personal Hx of Anesthesia complications.                    Social:  Smoker        Hematology/Oncology:  Hematology Normal                     Current/Recent Cancer.                EENT/Dental:  EENT/Dental Normal           Cardiovascular:  Cardiovascular Normal                                              Pulmonary:   COPD                     Renal/:  Renal/ Normal                 Hepatic/GI:  Hepatic/GI Normal                    Neurological:  Neurology Normal                                      Endocrine:  Endocrine Normal            Psych:  Psychiatric Normal                    Physical Exam  General: Cooperative and Alert    Airway:  Mallampati: II   Mouth Opening: Small, but > 3cm  TM Distance: 4 - 6 cm  Tongue: Normal  Neck ROM: Normal ROM    Dental:  Intact    Chest/Lungs:  Normal Respiratory Rate    Heart:  Rate: Normal        Anesthesia Plan  Type of Anesthesia, risks & benefits discussed:    Anesthesia Type: Gen ETT  Intra-op Monitoring Plan: Standard ASA Monitors  Post Op Pain Control Plan: multimodal analgesia and IV/PO Opioids PRN  Induction:  IV  Airway Plan: Direct and Video, Post-Induction  Informed Consent: Informed consent signed with the Patient and all parties understand the risks and agree with anesthesia plan.  All questions answered.   ASA Score: 3  Day of Surgery Review of History & Physical: H&P Update referred to the surgeon/provider.    Ready For Surgery From Anesthesia Perspective.     .           [1]   Patient Active Problem List  Diagnosis    Palpitations    Abnormal EKG    Chest pain, atypical    Chronic obstructive pulmonary disease, unspecified COPD type    Pulmonary nodules    Tobacco abuse

## 2025-06-30 ENCOUNTER — TELEPHONE (OUTPATIENT)
Dept: PULMONOLOGY | Facility: CLINIC | Age: 70
End: 2025-06-30
Payer: MEDICARE

## 2025-06-30 NOTE — TELEPHONE ENCOUNTER
I spoke with patient in regards to scheduling an cardiologist appointment. Patient states she received a call to schedule an appointment. I informed patient per Dr. Yoon last note, stress test is negative but recommend EP or cards evaluation if surgery is still considered an option. I also, advised patient to reach out to Dr. Green office as well. Patient verbalized understanding.

## 2025-07-01 ENCOUNTER — E-VISIT (OUTPATIENT)
Dept: FAMILY MEDICINE | Facility: CLINIC | Age: 70
End: 2025-07-01
Payer: MEDICARE

## 2025-07-01 DIAGNOSIS — R39.89 SUSPECTED UTI: Primary | ICD-10-CM

## 2025-07-01 DIAGNOSIS — R10.2 PELVIC PAIN: ICD-10-CM

## 2025-07-01 RX ORDER — CIPROFLOXACIN 250 MG/1
250 TABLET, FILM COATED ORAL 2 TIMES DAILY
Qty: 6 TABLET | Refills: 0 | Status: SHIPPED | OUTPATIENT
Start: 2025-07-01 | End: 2025-07-03

## 2025-07-01 NOTE — PROGRESS NOTES
Patient ID: Renata Garcia is a 69 y.o. female.    Chief Complaint: Pelvic Pain    The patient initiated a request through [employer] on 7/1/2025 for evaluation and management with a chief complaint of Pelvic Pain     I evaluated the questionnaire submission on 7/1/25.    [facility]    7/1/2025  3:56 PM CDT - Filed by Patient   What do you need help with? Urinary Symptoms   Do you agree to participate in an E-Visit? Yes   If you have any of the following symptoms, please go to the nearest emergency room or call 911: I acknowledge   What is the main issue you would like addressed today? Am having surgery on july 10th, and am having some pressure on lower right side when i urinate thought I might check to see if i am getting an uti uti   Do you have any of the following symptoms? ( Discomfort or pain passing urine, Passing urine more frequently, Cloudy urine, Discharge in urine, Other, None) None   When did your concern begin? 6/28/2025   What medications or treatments have you used to help your symptoms? (Antibiotics, Cranberry products, Drinking more fluids, Painkillers, Other, None) None   What does your urine look like? (Clear, Cloudy, Dark yellow, Light yellow, Pink or red (bloody), Foamy, Not sure) Light yellow   Have you had any of the following symptoms during the past 24 hours?   Urgency (a sudden and uncontrollable urge to urinate) (None, Mild, Moderate, Severe) None   Frequency (going to the toilet very often) (None, Mild, Moderate, Severe) None   Burning pain when urinating (None, Mild, Moderate, Severe) None   Incomplete bladder emptying (still feel like you need to urinate again after urination) (None, Mild, Moderate, Severe) Mild   Pain in the lower belly when youre not urinating. (None, Mild, Moderate, Severe) Mild   Please rate how much discomfort these symptoms have caused in the last 24 hours. (None, Mild, Moderate, Severe) None   Have you had any of the following  symptoms during the past 24 hours?   Blood seen in the urine (None, Mild, Moderate, Severe) None   Pain in the lower back on one or both sides (flank pain) (None, Mild, Moderate, Severe) None   Abnormal Vaginal Discharge (abnormal amount, color and/or odor) (None, Mild, Moderate, Severe) None   Discharge from the opening you urinate from (urethra) when not urinating. (None, Mild, Moderate, Severe) None   Tell us how the symptoms have interfered with your every day activities/work in the past 24 hours. (None, Mild, Moderate, Severe) None   Do you have a fever? (Less than 100.4°F, 100.4°F or greater, No, Not sure) No   Are you a diabetic? No   If you are female, please tell us if you have any of the following right now (as youre completing the questionnaire): (Menstrual period (menses), PMS (Premenstrual syndrome),Signs of menopause such as hot flashes, Pregnancy, None) None   Do you have a history of kidney stones? No   Provide any information you feel is important to your history not asked above    Please attach any relevant images or files (if you have performed a home test for UTI, please submit a photo of results)    Are you able to take your vitals? No         Encounter Diagnoses   Name Primary?    Pelvic pain     Suspected UTI Yes        Orders Placed This Encounter   Procedures    Urine Culture High Risk ($$)     Standing Status:   Future     Expected Date:   7/1/2025     Expiration Date:   9/29/2026     Send normal result to authorizing provider's In Basket if patient is active on MyChart::   Yes    Urinalysis     Standing Status:   Future     Expected Date:   7/1/2025     Expiration Date:   8/30/2026     Collection Type:   Urine, Clean Catch      Medications Ordered This Encounter   Medications    ciprofloxacin HCl (CIPRO) 250 MG tablet     Sig: Take 1 tablet (250 mg total) by mouth 2 (two) times daily. for 3 days     Dispense:  6 tablet     Refill:  0      Please see [facility] message for the plan of care.      Follow up if symptoms worsen or fail to improve.      E-Visit Time Tracking:    Day 1 Time (in minutes): 5    Total Time (in minutes): 5

## 2025-07-02 ENCOUNTER — LAB VISIT (OUTPATIENT)
Dept: LAB | Facility: HOSPITAL | Age: 70
End: 2025-07-02
Attending: FAMILY MEDICINE
Payer: MEDICARE

## 2025-07-02 ENCOUNTER — RESULTS FOLLOW-UP (OUTPATIENT)
Dept: FAMILY MEDICINE | Facility: CLINIC | Age: 70
End: 2025-07-02

## 2025-07-02 ENCOUNTER — OFFICE VISIT (OUTPATIENT)
Dept: CARDIOLOGY | Facility: CLINIC | Age: 70
End: 2025-07-02
Payer: MEDICARE

## 2025-07-02 VITALS
WEIGHT: 124.75 LBS | HEART RATE: 81 BPM | BODY MASS INDEX: 17.86 KG/M2 | OXYGEN SATURATION: 94 % | RESPIRATION RATE: 19 BRPM | HEIGHT: 70 IN | SYSTOLIC BLOOD PRESSURE: 120 MMHG | DIASTOLIC BLOOD PRESSURE: 64 MMHG

## 2025-07-02 DIAGNOSIS — I47.11 INAPPROPRIATE SINUS TACHYCARDIA: ICD-10-CM

## 2025-07-02 DIAGNOSIS — I70.0 AORTIC ATHEROSCLEROSIS: ICD-10-CM

## 2025-07-02 DIAGNOSIS — R00.2 PALPITATIONS: Primary | ICD-10-CM

## 2025-07-02 DIAGNOSIS — R10.2 PELVIC PAIN: ICD-10-CM

## 2025-07-02 DIAGNOSIS — I25.10 CORONARY ARTERY CALCIFICATION SEEN ON CAT SCAN: ICD-10-CM

## 2025-07-02 LAB
BILIRUB UR QL STRIP.AUTO: NEGATIVE
CLARITY UR: CLEAR
COLOR UR AUTO: YELLOW
ESTROGEN SERPL-MCNC: ABNORMAL PG/ML
GLUCOSE UR QL STRIP: NEGATIVE
HGB UR QL STRIP: NEGATIVE
INSULIN SERPL-ACNC: ABNORMAL U[IU]/ML
KETONES UR QL STRIP: NEGATIVE
LAB AP CLINICAL INFORMATION: ABNORMAL
LAB AP GROSS DESCRIPTION: ABNORMAL
LAB AP NON-GYN INTERPRETATION SPECIMEN 1: ABNORMAL
LAB AP NON-GYN INTERPRETATION SPECIMEN 2: ABNORMAL
LAB AP NON-GYN INTERPRETATION SPECIMEN 3: ABNORMAL
LAB AP NON-GYN INTERPRETATION SPECIMEN 4: ABNORMAL
LAB AP PERFORMING LOCATION(S): ABNORMAL
LAB AP REPORT FOOTNOTES: ABNORMAL
LEUKOCYTE ESTERASE UR QL STRIP: NEGATIVE
NITRITE UR QL STRIP: NEGATIVE
PH UR STRIP: 7 [PH]
PROT UR QL STRIP: NEGATIVE
SP GR UR STRIP: 1.02
UROBILINOGEN UR STRIP-ACNC: NEGATIVE EU/DL

## 2025-07-02 PROCEDURE — 3288F FALL RISK ASSESSMENT DOCD: CPT | Mod: CPTII,S$GLB,, | Performed by: INTERNAL MEDICINE

## 2025-07-02 PROCEDURE — 81003 URINALYSIS AUTO W/O SCOPE: CPT

## 2025-07-02 PROCEDURE — 99203 OFFICE O/P NEW LOW 30 MIN: CPT | Mod: S$GLB,,, | Performed by: INTERNAL MEDICINE

## 2025-07-02 PROCEDURE — 3078F DIAST BP <80 MM HG: CPT | Mod: CPTII,S$GLB,, | Performed by: INTERNAL MEDICINE

## 2025-07-02 PROCEDURE — 1159F MED LIST DOCD IN RCRD: CPT | Mod: CPTII,S$GLB,, | Performed by: INTERNAL MEDICINE

## 2025-07-02 PROCEDURE — 1126F AMNT PAIN NOTED NONE PRSNT: CPT | Mod: CPTII,S$GLB,, | Performed by: INTERNAL MEDICINE

## 2025-07-02 PROCEDURE — 1160F RVW MEDS BY RX/DR IN RCRD: CPT | Mod: CPTII,S$GLB,, | Performed by: INTERNAL MEDICINE

## 2025-07-02 PROCEDURE — 3074F SYST BP LT 130 MM HG: CPT | Mod: CPTII,S$GLB,, | Performed by: INTERNAL MEDICINE

## 2025-07-02 PROCEDURE — 99999 PR PBB SHADOW E&M-EST. PATIENT-LVL IV: CPT | Mod: PBBFAC,HCNC,, | Performed by: INTERNAL MEDICINE

## 2025-07-02 PROCEDURE — 87077 CULTURE AEROBIC IDENTIFY: CPT | Mod: 91

## 2025-07-02 PROCEDURE — 1101F PT FALLS ASSESS-DOCD LE1/YR: CPT | Mod: CPTII,S$GLB,, | Performed by: INTERNAL MEDICINE

## 2025-07-02 PROCEDURE — 3008F BODY MASS INDEX DOCD: CPT | Mod: CPTII,S$GLB,, | Performed by: INTERNAL MEDICINE

## 2025-07-02 NOTE — PROGRESS NOTES
CARDIOLOGY CLINIC VISIT        HISTORY OF PRESENT ILLNESS:     07/02/2025:Renata Garcia presents for evaluation of irregular heart rate. Diagnosed with lung cancer last month.  Recent nuclear stress showed no evidence of ischemia or infarction.  EKG negative for ischemia. Post bronchoscopy HR reported  by nursing. Has palpitations at home occasionally. Last a few seconds.    CARDIOVASCULAR HISTORY:     Coronary calcification seen on CAT scan  Aortic atherosclerosis    PAST MEDICAL HISTORY:   History reviewed. No pertinent past medical history.    PAST SURGICAL HISTORY:     Past Surgical History:   Procedure Laterality Date    COLONOSCOPY N/A 09/18/2024    Procedure: COLONOSCOPY;  Surgeon: Benoit Chin MD;  Location: [facility] END[facility];  Service: Endoscopy;  Laterality: N/A;  8/21 ref  by LEANDRO HERNANDEZ, JANY, instr. to portal-st  9/10/24- lvm/portal for pc. DB  9/16 pt r/s, PEG, portal.efren    LASER ABLATION OF THE CERVIX      ROBOTIC BRONCHOSCOPY N/A 6/27/2025    Procedure: ROBOTIC BRONCHOSCOPY;  Surgeon: Raquel Yoon MD;  Location: [address];  Service: Pulmonary;  Laterality: N/A;       ALLERGIES AND MEDICATION:     Review of patient's allergies indicates:   Allergen Reactions    Codeine Nausea And Vomiting        Medication List            Accurate as of July 2, 2025 12:58 PM. If you have any questions, ask your nurse or doctor.                CONTINUE taking these medications      ciprofloxacin HCl 250 MG tablet  Commonly known as: CIPRO  Take 1 tablet (250 mg total) by mouth 2 (two) times daily. for 3 days              SOCIAL HISTORY:   Social History[1]    FAMILY HISTORY:     Family History   Problem Relation Name Age of Onset    Cancer Sister      Hepatitis Sister      Cancer Maternal Uncle         REVIEW OF SYSTEMS:   Review of Systems   Constitutional:  Negative for chills, diaphoresis, fever, malaise/fatigue and weight loss.   HENT:  Negative for congestion, hearing loss, sinus pain, sore  "throat and tinnitus.    Eyes:  Negative for blurred vision, double vision, photophobia and pain.   Respiratory:  Negative for cough, hemoptysis, sputum production, shortness of breath, wheezing and stridor.    Cardiovascular:  Positive for palpitations. Negative for chest pain, orthopnea, claudication, leg swelling and PND.   Gastrointestinal:  Negative for abdominal pain, blood in stool, heartburn, melena, nausea and vomiting.   Musculoskeletal:  Negative for back pain, falls, joint pain, myalgias and neck pain.   Neurological:  Negative for dizziness, tingling, tremors, sensory change, speech change, focal weakness, seizures, loss of consciousness, weakness and headaches.   Endo/Heme/Allergies:  Does not bruise/bleed easily.   Psychiatric/Behavioral:  Negative for depression, memory loss and substance abuse. The patient is not nervous/anxious.        PHYSICAL EXAM:     Vitals:    07/02/25 1117   BP: 120/64   Pulse: 81   Resp: 19    Body mass index is 17.9 kg/m².  Weight: 56.6 kg (124 lb 12.5 oz)   Height: 5' 10" (177.8 cm)     Physical Exam  Vitals reviewed.   Constitutional:       General: She is not in acute distress.     Appearance: Normal appearance. She is well-developed. She is not diaphoretic.   HENT:      Head: Normocephalic.   Neck:      Vascular: No carotid bruit or JVD.   Cardiovascular:      Rate and Rhythm: Normal rate and regular rhythm.      Pulses: Normal pulses.      Heart sounds: Normal heart sounds.   Pulmonary:      Effort: Pulmonary effort is normal.      Breath sounds: Normal breath sounds.   Abdominal:      General: Bowel sounds are normal.      Palpations: Abdomen is soft.      Tenderness: There is no abdominal tenderness.   Skin:     General: Skin is warm and dry.   Neurological:      Mental Status: She is alert and oriented to person, place, and time.   Psychiatric:         Speech: Speech normal.         Behavior: Behavior normal.         Thought Content: Thought content normal. "         DATA:   EKG: (personally reviewed tracing)       Laboratory:  CBC:  Recent Labs   Lab 07/13/24  1010 05/12/25  1607 06/02/25  1153   WBC 7.88 10.40 8.87   Hemoglobin 15.6  --   --    HGB  --  14.2 14.1   Hematocrit 48.2  --   --    HCT  --  42.5 43.8   Platelet Count  --  230 217   Platelets 212  --   --        CHEMISTRIES:  Recent Labs   Lab 07/13/24  1010 05/12/25  1607 06/02/25  1153   Glucose 93 92 104   Sodium 140 142 142   Potassium 4.4 4.6 4.7   BUN 13 13 15   Creatinine 0.8 0.8 0.7   Calcium 9.7 9.4 9.1       CARDIAC BIOMARKERS:        COAGS:  Recent Labs   Lab 05/12/25  1607 06/02/25  1153   INR 1.0 1.0       LIPIDS/LFTS:  Recent Labs   Lab 07/13/24  1010 06/02/25  1153   Cholesterol 193  --    Triglycerides 127  --    HDL 54  --    LDL Cholesterol 113.6  --    Non-HDL Cholesterol 139  --    AST 16 14   ALT 11 13       Hemoglobin A1C   Date Value Ref Range Status   07/13/2024 5.2 4.0 - 5.6 % Final     Comment:     ADA Screening Guidelines:  5.7-6.4%  Consistent with prediabetes  >or=6.5%  Consistent with diabetes    High levels of fetal hemoglobin interfere with the HbA1C  assay. Heterozygous hemoglobin variants (HbS, HgC, etc)do  not significantly interfere with this assay.   However, presence of multiple variants may affect accuracy.          The 10-year ASCVD risk score (Flaquito DK, et al., 2019) is: 11.4%    Values used to calculate the score:      Age: 69 years      Sex: Female      Is Non- : No      Diabetic: No      Tobacco smoker: Yes      Systolic Blood Pressure: 120 mmHg      Is BP treated: No      HDL Cholesterol: 54 mg/dL      Total Cholesterol: 193 mg/dL      Cardiovascular Testing:    Nuclear stress 06/13/2025:      Normal myocardial perfusion scan. There is no evidence of myocardial ischemia or infarction.    There is a mild intensity fixed perfusion abnormality in the inferior wall of the left ventricle secondary to diaphragm attenuation.    The gated perfusion  images showed an ejection fraction of 70% at rest. The gated perfusion images showed an ejection fraction of 70% post stress. Normal ejection fraction is greater than 53%.    There is normal wall motion at rest and post-stress.    LV cavity size is normal at rest and normal at post-stress.    The ECG portion of the study is negative for ischemia.    The patient reported chest pain during the stress test.    There were no arrhythmias during stress.    There is no prior study for comparison.    No echocardiogram results found for the past 12 months     No cardiac monitor results found for the past 12 months         ASSESSMENT:     Palpitations  Coronary calcification seen on CAT scan  Aortic atherosclerosis  Lung cancer    PLAN:     Palpitations:  Event monitor.  TSH.  Coronary calcifications seen on CAT scan:  Recent negative nuclear stress test.  Follow-up lipids.  Risk factor modification.  Return to clinic 2 months.           Parish Lowery MD, MPH, FACC, [facility]         [1]   Social History  Socioeconomic History    Marital status:    Tobacco Use    Smoking status: Every Day     Current packs/day: 0.50     Average packs/day: 0.5 packs/day for 56.5 years (28.2 ttl pk-yrs)     Types: Cigarettes     Start date: 1969     Passive exposure: Current    Smokeless tobacco: Current   Substance and Sexual Activity    Alcohol use: Yes     Comment: occ    Drug use: Never    Sexual activity: Not Currently     Social Drivers of Health     Financial Resource Strain: Low Risk  (4/23/2025)    Overall Financial Resource Strain (CARDIA)     Difficulty of Paying Living Expenses: Not hard at all   Food Insecurity: No Food Insecurity (4/23/2025)    Hunger Vital Sign     Worried About Running Out of Food in the Last Year: Never true     Ran Out of Food in the Last Year: Never true   Transportation Needs: No Transportation Needs (4/23/2025)    PRAPARE - Transportation     Lack of Transportation (Medical): No     Lack of  Transportation (Non-Medical): No   Stress: Stress Concern Present (4/23/2025)    [employer] - Occupational Stress Questionnaire     Feeling of Stress : To some extent   Housing Stability: Low Risk  (4/23/2025)    Housing Stability Vital Sign     Unable to Pay for Housing in the Last Year: No     Homeless in the Last Year: No

## 2025-07-03 ENCOUNTER — PATIENT MESSAGE (OUTPATIENT)
Dept: HEMATOLOGY/ONCOLOGY | Facility: CLINIC | Age: 70
End: 2025-07-03
Payer: MEDICARE

## 2025-07-03 ENCOUNTER — TELEPHONE (OUTPATIENT)
Dept: PULMONOLOGY | Facility: CLINIC | Age: 70
End: 2025-07-03
Payer: MEDICARE

## 2025-07-03 ENCOUNTER — PATIENT MESSAGE (OUTPATIENT)
Dept: FAMILY MEDICINE | Facility: CLINIC | Age: 70
End: 2025-07-03
Payer: MEDICARE

## 2025-07-03 DIAGNOSIS — C34.90 MALIGNANT NEOPLASM OF LUNG, UNSPECIFIED LATERALITY, UNSPECIFIED PART OF LUNG: Primary | ICD-10-CM

## 2025-07-03 DIAGNOSIS — R39.89 SUSPECTED UTI: ICD-10-CM

## 2025-07-03 LAB
BACTERIA UR CULT: ABNORMAL
BACTERIA UR CULT: ABNORMAL

## 2025-07-03 RX ORDER — AMOXICILLIN AND CLAVULANATE POTASSIUM 500; 125 MG/1; MG/1
1 TABLET, FILM COATED ORAL 2 TIMES DAILY
Qty: 10 TABLET | Refills: 0 | Status: SHIPPED | OUTPATIENT
Start: 2025-07-03 | End: 2025-07-08

## 2025-07-03 NOTE — TELEPHONE ENCOUNTER
Patient with squamous cell, no lymph node metastasis as EBUS was negative but also a GGO that is now biopsy proven adenocarcinoma.    Patient does have intermittent NS tachycardia that is symptomatic.  Saw cardiology who is placing an event monitor.    Will order 6MWT as she does complain of some HAQ..    Patient is scheduled for lobectomy with Dr Alvarez.  Will send message regarding results.

## 2025-07-07 ENCOUNTER — HOSPITAL ENCOUNTER (OUTPATIENT)
Dept: RESPIRATORY THERAPY | Facility: HOSPITAL | Age: 70
Discharge: HOME | End: 2025-07-07
Attending: INTERNAL MEDICINE
Payer: MEDICARE

## 2025-07-07 DIAGNOSIS — C34.90 MALIGNANT NEOPLASM OF LUNG, UNSPECIFIED LATERALITY, UNSPECIFIED PART OF LUNG: ICD-10-CM

## 2025-07-07 PROCEDURE — 94618 PULMONARY STRESS TESTING: CPT

## 2025-07-08 ENCOUNTER — CLINICAL SUPPORT (OUTPATIENT)
Dept: CARDIOLOGY | Facility: HOSPITAL | Age: 70
End: 2025-07-08
Attending: INTERNAL MEDICINE
Payer: MEDICARE

## 2025-07-08 ENCOUNTER — TELEPHONE (OUTPATIENT)
Dept: FAMILY MEDICINE | Facility: CLINIC | Age: 70
End: 2025-07-08
Payer: MEDICARE

## 2025-07-08 DIAGNOSIS — R00.2 PALPITATIONS: ICD-10-CM

## 2025-07-08 PROCEDURE — 93271 ECG/MONITORING AND ANALYSIS: CPT

## 2025-07-09 ENCOUNTER — OFFICE VISIT (OUTPATIENT)
Dept: PULMONOLOGY | Facility: CLINIC | Age: 70
End: 2025-07-09
Payer: MEDICARE

## 2025-07-09 ENCOUNTER — TELEPHONE (OUTPATIENT)
Dept: CARDIOTHORACIC SURGERY | Facility: CLINIC | Age: 70
End: 2025-07-09
Payer: MEDICARE

## 2025-07-09 ENCOUNTER — ANESTHESIA EVENT (OUTPATIENT)
Dept: SURGERY | Facility: HOSPITAL | Age: 70
DRG: 165 | End: 2025-07-09
Payer: MEDICARE

## 2025-07-09 VITALS
BODY MASS INDEX: 17.9 KG/M2 | DIASTOLIC BLOOD PRESSURE: 81 MMHG | HEIGHT: 70 IN | OXYGEN SATURATION: 96 % | WEIGHT: 125 LBS | HEART RATE: 78 BPM | SYSTOLIC BLOOD PRESSURE: 130 MMHG

## 2025-07-09 DIAGNOSIS — Z72.0 TOBACCO ABUSE: ICD-10-CM

## 2025-07-09 DIAGNOSIS — J44.9 CHRONIC OBSTRUCTIVE PULMONARY DISEASE, UNSPECIFIED COPD TYPE: Primary | ICD-10-CM

## 2025-07-09 DIAGNOSIS — C34.32 MALIGNANT NEOPLASM OF LOWER LOBE OF LEFT LUNG: ICD-10-CM

## 2025-07-09 PROCEDURE — 99999 PR PBB SHADOW E&M-EST. PATIENT-LVL III: CPT | Mod: PBBFAC,,, | Performed by: INTERNAL MEDICINE

## 2025-07-09 PROCEDURE — 94618 PULMONARY STRESS TESTING: CPT | Mod: 26,,, | Performed by: INTERNAL MEDICINE

## 2025-07-09 NOTE — PROGRESS NOTES
Renata Garcia  was seen as a follow up.      CHIEF COMPLAINT:  COPD      HISTORY OF PRESENT ILLNESS: Renata Garcia is a 69 y.o. female  has no past medical history on file.  Patient was noted to have 1 cm rul nodule on 7/15/24.  Repeat ct 3/10/25 noted new lll at 1 cm and enlarging lll ggo.  Patient was referred to pulmonary for further inputs.    Our first encounter was 4/24/25.  At that time, patient with intermittent cough.  No fever/chills.  No hemoptysis.  No weight loss.  Active caring for grandchildren.  No chest pain. No orthopnea.  No pnd.  No h/o prior pneumonia  S/p pet scan 4/30/25 with high pet avidity.  S/p ct guided biopsy LLL.  Pathology confirmed squamous cell carcinoma  Case presented at tumor conference. Recommendation would be for ebus evaluation + CT surgery for resection.   S/p EBUS 6/27/25  with adenocarcinoma lll.  Sinus tach during EBUS.  Seen by Dr. Lowery and was placed on event monitor.  Per Dr. Alvarez's note, plan is for left superior segmentectomy and followed by SBRT to GGO.    Doing well without need for oxygen.      Additional Pulmonary History:   Occupational/Environmental Exposures:  retire  after 45 years  Exposure to Animals/Pets:  dogs and cats   Foreign Travel History:  nonce since 1979  History of exposures to TB:  denied   Family History of Lung Cancer:  denied   Tobacco:  3/4 ppd; used to smoke 1 ppd since teenage years  Childhood history of Lung Disease:  denied  Chest surgery or trauma:  denied      PAST MEDICAL HISTORY:    Active Ambulatory Problems     Diagnosis Date Noted    Palpitations 05/05/2015    Abnormal EKG 05/05/2015    Chest pain, atypical 05/05/2015    Chronic obstructive pulmonary disease, unspecified COPD type 04/24/2025    Pulmonary nodules 04/24/2025    Tobacco abuse 04/24/2025    Coronary artery calcification seen on CAT scan 07/02/2025    Aortic atherosclerosis 07/02/2025    Malignant neoplasm of lower lobe of left lung 07/09/2025     Resolved  "Ambulatory Problems     Diagnosis Date Noted    No Resolved Ambulatory Problems     No Additional Past Medical History                PAST SURGICAL HISTORY:    Past Surgical History:   Procedure Laterality Date    COLONOSCOPY N/A 09/18/2024    Procedure: COLONOSCOPY;  Surgeon: Benoit Chin MD;  Location: [facility];  Service: Endoscopy;  Laterality: N/A;  8/21 ref  by LEANDRO HERNANDEZ, PEG, instr. to portal-st  9/10/24- lvm/portal for pc. DBM  9/16 pt r/s, PEG, portal.efren    LASER ABLATION OF THE CERVIX      ROBOTIC BRONCHOSCOPY N/A 6/27/2025    Procedure: ROBOTIC BRONCHOSCOPY;  Surgeon: Raquel Yoon MD;  Location: [facility] OR [address] [place] [address];  Service: Pulmonary;  Laterality: N/A;         FAMILY HISTORY:                Family History   Problem Relation Name Age of Onset    Cancer Sister      Hepatitis Sister      Cancer Maternal Uncle         SOCIAL HISTORY:          Tobacco: Tobacco Use History[1]  alcohol use:    Social History     Substance and Sexual Activity   Alcohol Use Yes    Comment: occ                   ALLERGIES:    Review of patient's allergies indicates:   Allergen Reactions    Codeine Nausea And Vomiting       CURRENT MEDICATIONS:  Current Medications[2]               REVIEW OF SYSTEMS:     Pulmonary related symptoms as per HPI.  Gen:  no weight loss, no fever, no night sweat  HEENT:  no visual changes, no sore throat, no hearing loss  CV:  per hpi  GI:  no melena, no hematochezia, no diarhea, no constipation.  :  no dysuria, no hematuria, no hesistancy, no dribbling  Neuro:  no syncope, no vertigo, no tinitus  Psych:  No homocide or suicide ideation; no depression.  Endocrine:  No heat or cold intolerance.  Sleep:  + snoring; no witnessed apnea.  Rested upon awake.    Otherwise, a balance of systems reviewed is negative.          PHYSICAL EXAM:  Vitals:    07/09/25 1131   BP: 130/81   Pulse: 78   SpO2: 96%   Weight: 56.7 kg (125 lb)   Height: 5' 10" (1.778 m)   PainSc: 0-No pain     Body mass index is " 17.94 kg/m².     GENERAL:  well develop; no apparent distress  HEENT:  no nasal congestion; no discharge noted; class 3 modified mallampatti.   NECK:  supple; no palpable masses.  CARDIO: regular rate and rhythm  PULM:  clear to auscultation bilaterally; no intercostals retractions; no accessory muscle usage   ABDOMEN:  soft nontender/nondistended.  +bowel sound  EXTREMITIES no cce  NEURO:  CN II-XII intact.  5/5 motor in all extremities.  sensation grossly intact   to light touch.  PSYCH:  normal affect.  Alert and oriented x 4    LABS  Pulmonary Functions Testing Results(personally reviewed):    PFT 7/15/24 Ratio of 67%; FVC 2.58 L (72%); FEV1 1.73 L (63%); TLC 5.4 L (91%); dlco 16.8 (66%)   ABG (personally reviewed):  none  CXR (personally reviewed):  none  CT CHEST(personally reviewed):  3/10/25 scattered stranding and nodular pattern in both apex (Rt>Lt).  +bullous changes in rul with bronchiectasis.  When compared to 7/15/24, there is new spiculated 1.2 cm nodule lll (4:212) and Ill defined ggo angelica (4:236)    Pet scan 4/24/25 high pet avidity    ASSESSMENT/PLAN  Problem List Items Addressed This Visit       Chronic obstructive pulmonary disease, unspecified COPD type - Primary    Overview   bullous/emphysematous changes noted on ct scan.    Fev 63%.    Encourage smoking cessation.  Deferred inhalers.    Down to 3-4 cigarettes per day  Does not quality for oxygen.              Malignant neoplasm of lower lobe of left lung    Overview   Squamous in lll and adenocarcinoma angelica  Per Dr. Alvarez, patient is schedule for segmentectomy superior segment of lll and sbrt to angelica adenocarcinoma            Patient will No follow-ups on file.     Visit today included increased complexity associated with the care of the episodic problem copd and lung cancer addressed and managing the longitudinal care of the patient due to the serious and/or complex managed problem(s) copd and lung cancer.            [1]   Social  History  Tobacco Use   Smoking Status Every Day    Current packs/day: 0.50    Average packs/day: 0.5 packs/day for 56.5 years (28.3 ttl pk-yrs)    Types: Cigarettes    Start date: 1969    Passive exposure: Current   Smokeless Tobacco Current   [2]   No current outpatient medications on file.     No current facility-administered medications for this visit.

## 2025-07-09 NOTE — PRE-PROCEDURE INSTRUCTIONS
Anesthesia Pre-Op Instructions given:     -- YOUR SURGEON'S OFFICE WILL PROVIDE YOUR ARRIVAL TIME  -- Medication information (what to hold and what to take)   -- NPO guidelines as follows: (or as per your Surgeon)  Stop ALL solid food, gum, candy 8 hours before arrival time.  Stop all CLOUDY liquids: coffee with creamer, cloudy juices, 8 hours prior to arrival time.  The patient should be ENCOURAGED to drink carbohydrate-rich clear liquids (sports drinks, clear juices) until 2 hours prior to arrival time.  Stop clear liquids 2 hours prior to arrival time.  CLEAR liquids include water, black coffee NO creamer, clear oral rehydration drinks, clear sports drinks and clear fruit juices (no orange juice, no pulpy juices, no apple cider).   IF IN DOUBT, drink water instead.   NOTHING TO DRINK 2 hours before surgery/procedure time. If you are told to take medication on the morning of surgery, it may be taken with a sip of water.   -- *Arrival place and directions given*.  Time to be given the day before procedure by the Surgeon's Office   -- Bathe with antibacterial soap (dial or Hibiclens as instructed)  -- Don't wear any jewelry or valuables and not metals on skin or hair AM of surgery   -- No makeup or moisturizer to face   -- No perfume/cologne, powder, lotions, aftershave or deodorant     Pt verbalized understanding.            *If going to , see below:      Directions and Instructions for [facility]   At [facility], we have an outstanding team of physicians, anesthesiologists, CRNAs, Registered Nurses, Surgical Technologists, and other ancillary team members all focused on your surgical and procedural care.   Before Your Procedure:   The physician's office will call you with a specific arrival time and directions a day or two before your scheduled procedure. You may also receive these instructions through your [facility] portal.   Day of Procedure:   Please be sure to arrive at  the arrival time given or you may risk your surgery being delayed or canceled. The arrival time is earlier than your scheduled surgery or procedure time. In the winter months please dress warm and bring blankets for you or your child as the waiting room may be cold. If you have difficulty locating the facility, please give us a call at [phone].   Directions:   The [facility] is located on the 1st floor of the hospital building near the [place] entrance.   Parking:   You will park in the South Parking Garage (note location on map). [facility] opens at 5:00 a.m. and has a drop off area by the entrance.  parking is available starting at 7:00 a.m. Please see below for further  parking instructions.   Directions from the parking garage elevators   [facility] Elevators: From the parking garage, take the blue [address] elevators (located in the center of the parking garage) to the 1st floor of the garage. You will then take a right once off the elevators then another right to the outside of the parking garage. You will be across from the [facility]. You will walk down the sidewalk, pass the  curve at the [place] entrance and continue to follow the sidewalk. You will pass the radiation oncology entrance on your right. Continue to follow the sidewalk to the [facility] glass door entrance.   Hospital Entrance (Inside Route): If a mostly inside route is preferred: Take the inside elevator bank (located at the far north end of the garage) from the parking garage to the 1st floor. On the 1st floor walk past [employer]. Keep walking down the center of the hallway towards the hospital elevators. Once you reach the red brick yash, take a left and go past the hospital elevators. Take another left and follow the blue and white [place] signs around the hallway to the end. Go outside of the door. You will see the [facility] entrance to your right.   Drop Off:   There is a drop off area at the doors of the [facility] for your convenience. If utilized for pediatric patients, an adult must accompany the patient into the surgery center while another adult arce the vehicle.    (at 7:00 a.m.):   Upon check-in, please let the  know that you are utilizing [employer] parking which is free. The . will then call [employer] for your car to be picked up. Your keys and phone number will be collected and given to [employer] services. You will then be given a ticket. Upon discharge, [employer] will be notified to bring your vehicle back when you are ready.           Directions to [facility] [place]:  [phone]     From 1st floor garage elevators: go past [facility] [employer] shop. Look for Black piano on side of coffee shop. Take Atrium (gold) elevator by piano up to 2nd floor. When you exit elevator follow long hallway (you should see a sign hanging from the ceiling that says Day of Surgery Family Waiting Room. When hallway ends you will be entering the day of surgery waiting area. Check in at the desk for your procedure.      From [facility] entrance: Make a right after you enter the door to the hospital. On your Left, take [place] elevator to 2nd floor. Check in at desk      From Lab desk on 2nd floor: Exit lab area toward hospital atrium. You should see a sign that says Day of Surgery Family Waiting Area. Make a Left and follow long hallway (you should see a sign hanging from the ceiling that says Day of Surgery Family Waiting Room. When hallway ends you will be entering the day of surgery waiting area. Check in at the desk for your procedure.

## 2025-07-09 NOTE — ANESTHESIA PREPROCEDURE EVALUATION
[facility]  Anesthesia Pre-Operative Evaluation         Patient Name: Renata Garcia  YOB: 1955  MRN: [id number]    SUBJECTIVE:     Pre-operative evaluation for Procedure(s) (LRB):  XI ROBOTIC RATS, WITH LYMPHADENECTOMY (Left)  XI ROBOTIC RATS,WITH LOBECTOMY,LUNG - Left lower lobe (Left)     07/09/2025    Renata Garcia is a 69 y.o. female w/ a significant PMHx of smoking (1ppd >50y), COPD, lung cancer, CAD, palpitations. Notably experienced episodes of tachycardia with irregular heartbeat during recent bronchoscopy, and was sent to interventional cardiology for follow up.    Patient now presents for the above procedure(s).    Nuclear stress test 6/2/25:    Normal myocardial perfusion scan. There is no evidence of myocardial ischemia or infarction.    There is a mild intensity fixed perfusion abnormality in the inferior wall of the left ventricle secondary to diaphragm attenuation.    The gated perfusion images showed an ejection fraction of 70% at rest. The gated perfusion images showed an ejection fraction of 70% post stress. Normal ejection fraction is greater than 53%.    There is normal wall motion at rest and post-stress.    LV cavity size is normal at rest and normal at post-stress.    The ECG portion of the study is negative for ischemia.    The patient reported chest pain during the stress test.    There were no arrhythmias during stress.    There is no prior study for comparison.       LDA: None documented.       Prev airway: None documented.    Drips: None documented.      Problem List[1]    Review of patient's allergies indicates:   Allergen Reactions    Codeine Nausea And Vomiting       Current Inpatient Medications:      Medications Ordered Prior to Encounter[2]    Past Surgical History:   Procedure Laterality Date    COLONOSCOPY N/A 09/18/2024    Procedure: COLONOSCOPY;  Surgeon: Benoit Chin MD;  Location: [facility];  Service: Endoscopy;  Laterality: N/A;  8/21 ref   by LEANDRO HERNANDEZ, JANY, instr. to portal-st  9/10/24- lvm/portal for pc. DBM  9/16 pt r/s, PEG, portal.efren    LASER ABLATION OF THE CERVIX      ROBOTIC BRONCHOSCOPY N/A 6/27/2025    Procedure: ROBOTIC BRONCHOSCOPY;  Surgeon: Raquel Yoon MD;  Location: [facility] OR [address] [place] [address];  Service: Pulmonary;  Laterality: N/A;       Social History[3]    OBJECTIVE:     Vital Signs Range (Last 24H):  Pulse:  [78]   BP: (130)/(81)   SpO2:  [96 %]       Significant Labs:  Lab Results   Component Value Date    WBC 8.87 06/02/2025    HGB 14.1 06/02/2025    HCT 43.8 06/02/2025     06/02/2025    CHOL 193 07/13/2024    TRIG 127 07/13/2024    HDL 54 07/13/2024    ALT 13 06/02/2025    AST 14 06/02/2025     06/02/2025    K 4.7 06/02/2025     06/02/2025    CREATININE 0.7 06/02/2025    BUN 15 06/02/2025    CO2 26 06/02/2025    TSH 1.370 04/15/2015    INR 1.0 06/02/2025    HGBA1C 5.2 07/13/2024       Diagnostic Studies: No relevant studies.    EKG:   Results for orders placed or performed in visit on 05/05/15   IN OFFICE EKG 12-LEAD (to [place])    Collection Time: 05/05/15  3:35 PM    Narrative    Test Reason : 785.1  Blood Pressure :  mmHG  Vent. Rate : 086 BPM     Atrial Rate : 086 BPM     P-R Int : 154 ms          QRS Dur : 100 ms      QT Int : 366 ms       P-R-T Axes : 079 053 058 degrees     QTc Int : 437 ms    Normal sinus rhythm  Possible Left atrial enlargement  Incomplete right bundle branch block  Borderline Abnormal ECG  When compared with ECG of 14-APR-2015 11:01,  No significant change was found  Confirmed by Erik Dillard MD ([id number]) on 5/7/2015 10:17:12 AM    Referred By: BRITTANY LAURENT           Confirmed By:Erik Dillard MD       2D ECHO:  TTE:  No results found for this or any previous visit.    SCOTT:  No results found for this or any previous visit.    ASSESSMENT/PLAN:           Pre-op Assessment    I have reviewed the Patient Summary Reports.     I have reviewed the Nursing Notes. I have reviewed the NPO  Status.   I have reviewed the Medications.     Review of Systems  Anesthesia Hx:  No problems with previous Anesthesia   History of prior surgery of interest to airway management or planning:          Denies Family Hx of Anesthesia complications.    Denies Personal Hx of Anesthesia complications.                    Social:  Smoker       Hematology/Oncology:       -- Denies Anemia:                  Current/Recent Cancer.                Cardiovascular:      Denies Hypertension.   CAD        Denies CHF.             Coronary Artery Disease:                                  Pulmonary:   COPD  Denies Asthma.        Chronic Obstructive Pulmonary Disease (COPD):                      Hepatic/GI:      Denies GERD.                Neurological:    Denies CVA.    Denies Seizures.                                Endocrine:  Denies Diabetes.                  Anesthesia Plan  Type of Anesthesia, risks & benefits discussed:    Anesthesia Type: Gen ETT  Intra-op Monitoring Plan: Standard ASA Monitors and Art Line  Post Op Pain Control Plan: multimodal analgesia and IV/PO Opioids PRN  Induction:  IV  Airway Plan: Direct, Video and Fiberoptic, Post-Induction  ASA Score: 2  Day of Surgery Review of History & Physical: H&P Update referred to the surgeon/provider.    Ready For Surgery From Anesthesia Perspective.     .           [1]   Patient Active Problem List  Diagnosis    Palpitations    Abnormal EKG    Chest pain, atypical    Chronic obstructive pulmonary disease, unspecified COPD type    Pulmonary nodules    Tobacco abuse    Coronary artery calcification seen on CAT scan    Aortic atherosclerosis    Malignant neoplasm of lower lobe of left lung   [2]   No current facility-administered medications on file prior to encounter.     No current outpatient medications on file prior to encounter.   [3]   Social History  Socioeconomic History    Marital status:    Tobacco Use    Smoking status: Every Day     Current packs/day: 0.50      Average packs/day: 0.5 packs/day for 56.5 years (28.3 ttl pk-yrs)     Types: Cigarettes     Start date: 1969     Passive exposure: Current    Smokeless tobacco: Current   Substance and Sexual Activity    Alcohol use: Yes     Comment: occ    Drug use: Never    Sexual activity: Not Currently     Social Drivers of Health     Financial Resource Strain: Low Risk  (4/23/2025)    Overall Financial Resource Strain (CARDIA)     Difficulty of Paying Living Expenses: Not hard at all   Food Insecurity: No Food Insecurity (4/23/2025)    Hunger Vital Sign     Worried About Running Out of Food in the Last Year: Never true     Ran Out of Food in the Last Year: Never true   Transportation Needs: No Transportation Needs (4/23/2025)    PRAPARE - Transportation     Lack of Transportation (Medical): No     Lack of Transportation (Non-Medical): No   Stress: Stress Concern Present (4/23/2025)    [employer] [employer] - Occupational Stress Questionnaire     Feeling of Stress : To some extent   Housing Stability: Low Risk  (4/23/2025)    Housing Stability Vital Sign     Unable to Pay for Housing in the Last Year: No     Homeless in the Last Year: No

## 2025-07-10 ENCOUNTER — HOSPITAL ENCOUNTER (INPATIENT)
Facility: HOSPITAL | Age: 70
LOS: 3 days | Discharge: HOME | DRG: 164 | End: 2025-07-13
Attending: STUDENT IN AN ORGANIZED HEALTH CARE EDUCATION/TRAINING PROGRAM | Admitting: STUDENT IN AN ORGANIZED HEALTH CARE EDUCATION/TRAINING PROGRAM
Payer: MEDICARE

## 2025-07-10 ENCOUNTER — ANESTHESIA (OUTPATIENT)
Dept: SURGERY | Facility: HOSPITAL | Age: 70
DRG: 165 | End: 2025-07-10
Payer: MEDICARE

## 2025-07-10 DIAGNOSIS — C34.92 MALIGNANT NEOPLASM OF LEFT LUNG, UNSPECIFIED PART OF LUNG: ICD-10-CM

## 2025-07-10 DIAGNOSIS — C34.32 MALIGNANT NEOPLASM OF LOWER LOBE OF LEFT LUNG: Primary | ICD-10-CM

## 2025-07-10 DIAGNOSIS — C34.92 MALIGNANT NEOPLASM OF LEFT LUNG: ICD-10-CM

## 2025-07-10 LAB
ABORH RETYPE: NORMAL
INDIRECT COOMBS: NORMAL
POCT GLUCOSE: 174 MG/DL (ref 70–110)
RH BLD: NORMAL
SPECIMEN OUTDATE: NORMAL

## 2025-07-10 PROCEDURE — 25000003 PHARM REV CODE 250: Performed by: NURSE ANESTHETIST, CERTIFIED REGISTERED

## 2025-07-10 PROCEDURE — 27201037 HC PRESSURE MONITORING SET UP

## 2025-07-10 PROCEDURE — 63600175 PHARM REV CODE 636 W HCPCS

## 2025-07-10 PROCEDURE — 25000003 PHARM REV CODE 250: Performed by: PHYSICIAN ASSISTANT

## 2025-07-10 PROCEDURE — 8E0W4CZ ROBOTIC ASSISTED PROCEDURE OF TRUNK REGION, PERCUTANEOUS ENDOSCOPIC APPROACH: ICD-10-PCS | Performed by: STUDENT IN AN ORGANIZED HEALTH CARE EDUCATION/TRAINING PROGRAM

## 2025-07-10 PROCEDURE — 32669 THORACOSCOPY REMOVE SEGMENT: CPT | Mod: LT,,, | Performed by: STUDENT IN AN ORGANIZED HEALTH CARE EDUCATION/TRAINING PROGRAM

## 2025-07-10 PROCEDURE — 36620 INSERTION CATHETER ARTERY: CPT | Mod: XU,,, | Performed by: ANESTHESIOLOGY

## 2025-07-10 PROCEDURE — 63600175 PHARM REV CODE 636 W HCPCS: Performed by: NURSE ANESTHETIST, CERTIFIED REGISTERED

## 2025-07-10 PROCEDURE — 71000015 HC POSTOP RECOV 1ST HR: Performed by: STUDENT IN AN ORGANIZED HEALTH CARE EDUCATION/TRAINING PROGRAM

## 2025-07-10 PROCEDURE — 27200695 HC TUBE, ENDOBRONCHIAL: Performed by: ANESTHESIOLOGY

## 2025-07-10 PROCEDURE — 63600175 PHARM REV CODE 636 W HCPCS: Performed by: PHYSICIAN ASSISTANT

## 2025-07-10 PROCEDURE — 25000003 PHARM REV CODE 250

## 2025-07-10 PROCEDURE — 86920 COMPATIBILITY TEST SPIN: CPT | Performed by: STUDENT IN AN ORGANIZED HEALTH CARE EDUCATION/TRAINING PROGRAM

## 2025-07-10 PROCEDURE — 25000242 PHARM REV CODE 250 ALT 637 W/ HCPCS: Performed by: PHYSICIAN ASSISTANT

## 2025-07-10 PROCEDURE — 88309 TISSUE EXAM BY PATHOLOGIST: CPT | Mod: 26,,, | Performed by: STUDENT IN AN ORGANIZED HEALTH CARE EDUCATION/TRAINING PROGRAM

## 2025-07-10 PROCEDURE — 27201423 OPTIME MED/SURG SUP & DEVICES STERILE SUPPLY: Performed by: STUDENT IN AN ORGANIZED HEALTH CARE EDUCATION/TRAINING PROGRAM

## 2025-07-10 PROCEDURE — 94640 AIRWAY INHALATION TREATMENT: CPT

## 2025-07-10 PROCEDURE — 88305 TISSUE EXAM BY PATHOLOGIST: CPT | Mod: 26,,, | Performed by: STUDENT IN AN ORGANIZED HEALTH CARE EDUCATION/TRAINING PROGRAM

## 2025-07-10 PROCEDURE — 0BBJ4ZZ EXCISION OF LEFT LOWER LUNG LOBE, PERCUTANEOUS ENDOSCOPIC APPROACH: ICD-10-PCS | Performed by: STUDENT IN AN ORGANIZED HEALTH CARE EDUCATION/TRAINING PROGRAM

## 2025-07-10 PROCEDURE — C1751 CATH, INF, PER/CENT/MIDLINE: HCPCS | Performed by: ANESTHESIOLOGY

## 2025-07-10 PROCEDURE — 88342 IMHCHEM/IMCYTCHM 1ST ANTB: CPT | Mod: 26,,, | Performed by: STUDENT IN AN ORGANIZED HEALTH CARE EDUCATION/TRAINING PROGRAM

## 2025-07-10 PROCEDURE — 20600001 HC STEP DOWN PRIVATE ROOM

## 2025-07-10 PROCEDURE — 32674 THORACOSCOPY LYMPH NODE EXC: CPT | Mod: ,,, | Performed by: STUDENT IN AN ORGANIZED HEALTH CARE EDUCATION/TRAINING PROGRAM

## 2025-07-10 PROCEDURE — 71000033 HC RECOVERY, INTIAL HOUR: Performed by: STUDENT IN AN ORGANIZED HEALTH CARE EDUCATION/TRAINING PROGRAM

## 2025-07-10 PROCEDURE — 37000009 HC ANESTHESIA EA ADD 15 MINS: Performed by: STUDENT IN AN ORGANIZED HEALTH CARE EDUCATION/TRAINING PROGRAM

## 2025-07-10 PROCEDURE — 99900035 HC TECH TIME PER 15 MIN (STAT)

## 2025-07-10 PROCEDURE — 07T74ZZ RESECTION OF THORAX LYMPHATIC, PERCUTANEOUS ENDOSCOPIC APPROACH: ICD-10-PCS | Performed by: STUDENT IN AN ORGANIZED HEALTH CARE EDUCATION/TRAINING PROGRAM

## 2025-07-10 PROCEDURE — 71000039 HC RECOVERY, EACH ADD'L HOUR: Performed by: STUDENT IN AN ORGANIZED HEALTH CARE EDUCATION/TRAINING PROGRAM

## 2025-07-10 PROCEDURE — 27202783 HC SCOPE, STORZ DISPOSABLE: Performed by: ANESTHESIOLOGY

## 2025-07-10 PROCEDURE — 32669 THORACOSCOPY REMOVE SEGMENT: CPT | Mod: AS,LT,, | Performed by: PHYSICIAN ASSISTANT

## 2025-07-10 PROCEDURE — C1729 CATH, DRAINAGE: HCPCS | Performed by: STUDENT IN AN ORGANIZED HEALTH CARE EDUCATION/TRAINING PROGRAM

## 2025-07-10 PROCEDURE — 36000712 HC OR TIME LEV V 1ST 15 MIN: Performed by: STUDENT IN AN ORGANIZED HEALTH CARE EDUCATION/TRAINING PROGRAM

## 2025-07-10 PROCEDURE — 94799 UNLISTED PULMONARY SVC/PX: CPT

## 2025-07-10 PROCEDURE — 63600175 PHARM REV CODE 636 W HCPCS: Performed by: STUDENT IN AN ORGANIZED HEALTH CARE EDUCATION/TRAINING PROGRAM

## 2025-07-10 PROCEDURE — 94761 N-INVAS EAR/PLS OXIMETRY MLT: CPT

## 2025-07-10 PROCEDURE — 37000008 HC ANESTHESIA 1ST 15 MINUTES: Performed by: STUDENT IN AN ORGANIZED HEALTH CARE EDUCATION/TRAINING PROGRAM

## 2025-07-10 PROCEDURE — 32674 THORACOSCOPY LYMPH NODE EXC: CPT | Mod: AS,,, | Performed by: PHYSICIAN ASSISTANT

## 2025-07-10 PROCEDURE — 86900 BLOOD TYPING SEROLOGIC ABO: CPT

## 2025-07-10 PROCEDURE — 88305 TISSUE EXAM BY PATHOLOGIST: CPT | Mod: TC | Performed by: STUDENT IN AN ORGANIZED HEALTH CARE EDUCATION/TRAINING PROGRAM

## 2025-07-10 PROCEDURE — 36000713 HC OR TIME LEV V EA ADD 15 MIN: Performed by: STUDENT IN AN ORGANIZED HEALTH CARE EDUCATION/TRAINING PROGRAM

## 2025-07-10 RX ORDER — OXYCODONE HYDROCHLORIDE 5 MG/1
5 TABLET ORAL
Status: DISCONTINUED | OUTPATIENT
Start: 2025-07-10 | End: 2025-07-10 | Stop reason: HOSPADM

## 2025-07-10 RX ORDER — DEXMEDETOMIDINE HYDROCHLORIDE 100 UG/ML
INJECTION, SOLUTION INTRAVENOUS
Status: DISCONTINUED | OUTPATIENT
Start: 2025-07-10 | End: 2025-07-10

## 2025-07-10 RX ORDER — SODIUM CHLORIDE 0.9 % (FLUSH) 0.9 %
10 SYRINGE (ML) INJECTION
Status: DISCONTINUED | OUTPATIENT
Start: 2025-07-10 | End: 2025-07-10 | Stop reason: HOSPADM

## 2025-07-10 RX ORDER — ONDANSETRON HYDROCHLORIDE 2 MG/ML
INJECTION, SOLUTION INTRAVENOUS
Status: DISCONTINUED | OUTPATIENT
Start: 2025-07-10 | End: 2025-07-10

## 2025-07-10 RX ORDER — ACETAMINOPHEN 500 MG
1000 TABLET ORAL
Status: COMPLETED | OUTPATIENT
Start: 2025-07-10 | End: 2025-07-10

## 2025-07-10 RX ORDER — ROCURONIUM BROMIDE 10 MG/ML
INJECTION, SOLUTION INTRAVENOUS
Status: DISCONTINUED | OUTPATIENT
Start: 2025-07-10 | End: 2025-07-10

## 2025-07-10 RX ORDER — ACETAMINOPHEN 325 MG/1
650 TABLET ORAL EVERY 8 HOURS
Status: DISCONTINUED | OUTPATIENT
Start: 2025-07-10 | End: 2025-07-13 | Stop reason: HOSPADM

## 2025-07-10 RX ORDER — CEFAZOLIN 2 G/1
2 INJECTION, POWDER, FOR SOLUTION INTRAMUSCULAR; INTRAVENOUS
Status: DISCONTINUED | OUTPATIENT
Start: 2025-07-10 | End: 2025-07-10 | Stop reason: HOSPADM

## 2025-07-10 RX ORDER — FENTANYL CITRATE 50 UG/ML
INJECTION, SOLUTION INTRAMUSCULAR; INTRAVENOUS
Status: COMPLETED
Start: 2025-07-10 | End: 2025-07-10

## 2025-07-10 RX ORDER — FENTANYL CITRATE 50 UG/ML
INJECTION, SOLUTION INTRAMUSCULAR; INTRAVENOUS
Status: DISCONTINUED | OUTPATIENT
Start: 2025-07-10 | End: 2025-07-10

## 2025-07-10 RX ORDER — HYDROMORPHONE HYDROCHLORIDE 1 MG/ML
0.2 INJECTION, SOLUTION INTRAMUSCULAR; INTRAVENOUS; SUBCUTANEOUS EVERY 5 MIN PRN
Status: DISCONTINUED | OUTPATIENT
Start: 2025-07-10 | End: 2025-07-10 | Stop reason: HOSPADM

## 2025-07-10 RX ORDER — ENOXAPARIN SODIUM 100 MG/ML
40 INJECTION SUBCUTANEOUS EVERY 24 HOURS
Status: DISCONTINUED | OUTPATIENT
Start: 2025-07-11 | End: 2025-07-13 | Stop reason: HOSPADM

## 2025-07-10 RX ORDER — IPRATROPIUM BROMIDE AND ALBUTEROL SULFATE 2.5; .5 MG/3ML; MG/3ML
3 SOLUTION RESPIRATORY (INHALATION)
Status: DISCONTINUED | OUTPATIENT
Start: 2025-07-10 | End: 2025-07-10

## 2025-07-10 RX ORDER — OXYCODONE HYDROCHLORIDE 10 MG/1
10 TABLET ORAL EVERY 4 HOURS PRN
Status: DISCONTINUED | OUTPATIENT
Start: 2025-07-10 | End: 2025-07-11

## 2025-07-10 RX ORDER — METOPROLOL TARTRATE 25 MG/1
12.5 TABLET ORAL 2 TIMES DAILY
Status: DISCONTINUED | OUTPATIENT
Start: 2025-07-10 | End: 2025-07-13 | Stop reason: HOSPADM

## 2025-07-10 RX ORDER — BUPIVACAINE HYDROCHLORIDE 5 MG/ML
INJECTION, SOLUTION EPIDURAL; INTRACAUDAL; PERINEURAL
Status: DISCONTINUED | OUTPATIENT
Start: 2025-07-10 | End: 2025-07-10 | Stop reason: HOSPADM

## 2025-07-10 RX ORDER — POLYETHYLENE GLYCOL 3350 17 G/17G
17 POWDER, FOR SOLUTION ORAL DAILY
Status: DISCONTINUED | OUTPATIENT
Start: 2025-07-11 | End: 2025-07-13 | Stop reason: HOSPADM

## 2025-07-10 RX ORDER — AMOXICILLIN 250 MG
1 CAPSULE ORAL 2 TIMES DAILY
Status: DISCONTINUED | OUTPATIENT
Start: 2025-07-10 | End: 2025-07-13 | Stop reason: HOSPADM

## 2025-07-10 RX ORDER — ONDANSETRON 8 MG/1
8 TABLET, ORALLY DISINTEGRATING ORAL EVERY 8 HOURS PRN
Status: DISCONTINUED | OUTPATIENT
Start: 2025-07-10 | End: 2025-07-13 | Stop reason: HOSPADM

## 2025-07-10 RX ORDER — OXYCODONE HYDROCHLORIDE 5 MG/1
5 TABLET ORAL EVERY 4 HOURS PRN
Status: DISCONTINUED | OUTPATIENT
Start: 2025-07-10 | End: 2025-07-11

## 2025-07-10 RX ORDER — EPHEDRINE SULFATE 50 MG/ML
INJECTION, SOLUTION INTRAVENOUS
Status: DISCONTINUED | OUTPATIENT
Start: 2025-07-10 | End: 2025-07-10

## 2025-07-10 RX ORDER — BUPIVACAINE HYDROCHLORIDE 2.5 MG/ML
INJECTION, SOLUTION EPIDURAL; INFILTRATION; INTRACAUDAL; PERINEURAL
Status: DISCONTINUED | OUTPATIENT
Start: 2025-07-10 | End: 2025-07-10 | Stop reason: HOSPADM

## 2025-07-10 RX ORDER — METHOCARBAMOL 500 MG/1
500 TABLET, FILM COATED ORAL 4 TIMES DAILY
Status: DISCONTINUED | OUTPATIENT
Start: 2025-07-10 | End: 2025-07-13 | Stop reason: HOSPADM

## 2025-07-10 RX ORDER — PROPOFOL 10 MG/ML
VIAL (ML) INTRAVENOUS
Status: DISCONTINUED | OUTPATIENT
Start: 2025-07-10 | End: 2025-07-10

## 2025-07-10 RX ORDER — GLUCAGON 1 MG
1 KIT INJECTION
Status: DISCONTINUED | OUTPATIENT
Start: 2025-07-10 | End: 2025-07-10 | Stop reason: HOSPADM

## 2025-07-10 RX ORDER — PHENYLEPHRINE HCL IN 0.9% NACL 1 MG/10 ML
SYRINGE (ML) INTRAVENOUS
Status: DISCONTINUED | OUTPATIENT
Start: 2025-07-10 | End: 2025-07-10

## 2025-07-10 RX ORDER — LIDOCAINE HYDROCHLORIDE 20 MG/ML
INJECTION, SOLUTION EPIDURAL; INFILTRATION; INTRACAUDAL; PERINEURAL
Status: DISCONTINUED | OUTPATIENT
Start: 2025-07-10 | End: 2025-07-10

## 2025-07-10 RX ORDER — KETAMINE HCL IN 0.9 % NACL 50 MG/5 ML
SYRINGE (ML) INTRAVENOUS
Status: DISCONTINUED | OUTPATIENT
Start: 2025-07-10 | End: 2025-07-10

## 2025-07-10 RX ORDER — GABAPENTIN 300 MG/1
300 CAPSULE ORAL NIGHTLY
Status: DISCONTINUED | OUTPATIENT
Start: 2025-07-10 | End: 2025-07-11

## 2025-07-10 RX ORDER — ONDANSETRON HYDROCHLORIDE 2 MG/ML
4 INJECTION, SOLUTION INTRAVENOUS DAILY PRN
Status: DISCONTINUED | OUTPATIENT
Start: 2025-07-10 | End: 2025-07-10 | Stop reason: HOSPADM

## 2025-07-10 RX ORDER — BUPIVACAINE 13.3 MG/ML
INJECTION, SUSPENSION, LIPOSOMAL INFILTRATION
Status: DISCONTINUED | OUTPATIENT
Start: 2025-07-10 | End: 2025-07-10 | Stop reason: HOSPADM

## 2025-07-10 RX ORDER — DEXAMETHASONE SODIUM PHOSPHATE 4 MG/ML
INJECTION, SOLUTION INTRA-ARTICULAR; INTRALESIONAL; INTRAMUSCULAR; INTRAVENOUS; SOFT TISSUE
Status: DISCONTINUED | OUTPATIENT
Start: 2025-07-10 | End: 2025-07-10

## 2025-07-10 RX ORDER — LEVALBUTEROL 1.25 MG/.5ML
1.25 SOLUTION, CONCENTRATE RESPIRATORY (INHALATION) EVERY 12 HOURS
Status: DISCONTINUED | OUTPATIENT
Start: 2025-07-10 | End: 2025-07-13

## 2025-07-10 RX ORDER — INDOCYANINE GREEN AND WATER 25 MG
KIT INJECTION
Status: DISCONTINUED | OUTPATIENT
Start: 2025-07-10 | End: 2025-07-10

## 2025-07-10 RX ORDER — LIDOCAINE HYDROCHLORIDE 10 MG/ML
1 INJECTION, SOLUTION EPIDURAL; INFILTRATION; INTRACAUDAL; PERINEURAL ONCE
Status: DISCONTINUED | OUTPATIENT
Start: 2025-07-10 | End: 2025-07-10 | Stop reason: HOSPADM

## 2025-07-10 RX ORDER — MIDAZOLAM HYDROCHLORIDE 1 MG/ML
INJECTION INTRAMUSCULAR; INTRAVENOUS
Status: DISCONTINUED | OUTPATIENT
Start: 2025-07-10 | End: 2025-07-10

## 2025-07-10 RX ORDER — CEFAZOLIN SODIUM 1 G/3ML
INJECTION, POWDER, FOR SOLUTION INTRAMUSCULAR; INTRAVENOUS
Status: DISCONTINUED | OUTPATIENT
Start: 2025-07-10 | End: 2025-07-10

## 2025-07-10 RX ORDER — CEFAZOLIN 2 G/1
2 INJECTION, POWDER, FOR SOLUTION INTRAMUSCULAR; INTRAVENOUS
Status: COMPLETED | OUTPATIENT
Start: 2025-07-10 | End: 2025-07-11

## 2025-07-10 RX ORDER — BISACODYL 10 MG/1
10 SUPPOSITORY RECTAL DAILY PRN
Status: DISCONTINUED | OUTPATIENT
Start: 2025-07-10 | End: 2025-07-13 | Stop reason: HOSPADM

## 2025-07-10 RX ADMIN — SODIUM CHLORIDE, SODIUM GLUCONATE, SODIUM ACETATE, POTASSIUM CHLORIDE, MAGNESIUM CHLORIDE, SODIUM PHOSPHATE, DIBASIC, AND POTASSIUM PHOSPHATE: .53; .5; .37; .037; .03; .012; .00082 INJECTION, SOLUTION INTRAVENOUS at 01:07

## 2025-07-10 RX ADMIN — DEXAMETHASONE SODIUM PHOSPHATE 8 MG: 4 INJECTION, SOLUTION INTRAMUSCULAR; INTRAVENOUS at 01:07

## 2025-07-10 RX ADMIN — ROCURONIUM BROMIDE 10 MG: 10 INJECTION, SOLUTION INTRAVENOUS at 04:07

## 2025-07-10 RX ADMIN — DEXMEDETOMIDINE 8 MCG: 200 INJECTION, SOLUTION INTRAVENOUS at 05:07

## 2025-07-10 RX ADMIN — HYDROMORPHONE HYDROCHLORIDE 0.2 MG: 1 INJECTION, SOLUTION INTRAMUSCULAR; INTRAVENOUS; SUBCUTANEOUS at 06:07

## 2025-07-10 RX ADMIN — ROCURONIUM BROMIDE 50 MG: 10 INJECTION, SOLUTION INTRAVENOUS at 02:07

## 2025-07-10 RX ADMIN — SUGAMMADEX 100 MG: 100 INJECTION, SOLUTION INTRAVENOUS at 05:07

## 2025-07-10 RX ADMIN — FENTANYL CITRATE 50 MCG: 50 INJECTION, SOLUTION INTRAMUSCULAR; INTRAVENOUS at 05:07

## 2025-07-10 RX ADMIN — GLYCOPYRROLATE 0.2 MG: 0.2 INJECTION, SOLUTION INTRAMUSCULAR; INTRAVENOUS at 02:07

## 2025-07-10 RX ADMIN — MIDAZOLAM HYDROCHLORIDE 2 MG: 2 INJECTION, SOLUTION INTRAMUSCULAR; INTRAVENOUS at 01:07

## 2025-07-10 RX ADMIN — ACETAMINOPHEN 1000 MG: 500 TABLET ORAL at 10:07

## 2025-07-10 RX ADMIN — EPHEDRINE SULFATE 10 MG: 50 INJECTION INTRAVENOUS at 02:07

## 2025-07-10 RX ADMIN — EPHEDRINE SULFATE 5 MG: 50 INJECTION INTRAVENOUS at 03:07

## 2025-07-10 RX ADMIN — OXYCODONE HYDROCHLORIDE 10 MG: 10 TABLET ORAL at 06:07

## 2025-07-10 RX ADMIN — SODIUM CHLORIDE: 0.9 INJECTION, SOLUTION INTRAVENOUS at 12:07

## 2025-07-10 RX ADMIN — Medication 30 MG: at 03:07

## 2025-07-10 RX ADMIN — Medication 100 MCG: at 01:07

## 2025-07-10 RX ADMIN — ONDANSETRON 4 MG: 2 INJECTION INTRAMUSCULAR; INTRAVENOUS at 04:07

## 2025-07-10 RX ADMIN — EPHEDRINE SULFATE 5 MG: 50 INJECTION INTRAVENOUS at 04:07

## 2025-07-10 RX ADMIN — INDOCYANINE GREEN AND WATER 10 MG: KIT at 04:07

## 2025-07-10 RX ADMIN — GABAPENTIN 400 MG: 300 CAPSULE ORAL at 10:07

## 2025-07-10 RX ADMIN — LEVALBUTEROL 1.25 MG: 1.25 SOLUTION, CONCENTRATE RESPIRATORY (INHALATION) at 10:07

## 2025-07-10 RX ADMIN — CEFAZOLIN 2 G: 2 INJECTION, POWDER, FOR SOLUTION INTRAMUSCULAR; INTRAVENOUS at 06:07

## 2025-07-10 RX ADMIN — CEFAZOLIN 2 G: 330 INJECTION, POWDER, FOR SOLUTION INTRAMUSCULAR; INTRAVENOUS at 01:07

## 2025-07-10 RX ADMIN — Medication 10 MG: at 04:07

## 2025-07-10 RX ADMIN — PROPOFOL 100 MG: 10 INJECTION, EMULSION INTRAVENOUS at 01:07

## 2025-07-10 RX ADMIN — GABAPENTIN 300 MG: 300 CAPSULE ORAL at 09:07

## 2025-07-10 RX ADMIN — ROCURONIUM BROMIDE 10 MG: 10 INJECTION, SOLUTION INTRAVENOUS at 03:07

## 2025-07-10 RX ADMIN — METHOCARBAMOL 500 MG: 500 TABLET ORAL at 09:07

## 2025-07-10 RX ADMIN — SENNOSIDES AND DOCUSATE SODIUM 1 TABLET: 50; 8.6 TABLET ORAL at 09:07

## 2025-07-10 RX ADMIN — HYDROMORPHONE HYDROCHLORIDE 0.2 MG: 1 INJECTION, SOLUTION INTRAMUSCULAR; INTRAVENOUS; SUBCUTANEOUS at 07:07

## 2025-07-10 RX ADMIN — EPHEDRINE SULFATE 5 MG: 50 INJECTION INTRAVENOUS at 02:07

## 2025-07-10 RX ADMIN — Medication 100 MCG: at 02:07

## 2025-07-10 RX ADMIN — ROCURONIUM BROMIDE 100 MG: 10 INJECTION, SOLUTION INTRAVENOUS at 01:07

## 2025-07-10 RX ADMIN — LIDOCAINE HYDROCHLORIDE 100 MG: 20 INJECTION, SOLUTION EPIDURAL; INFILTRATION; INTRACAUDAL; PERINEURAL at 01:07

## 2025-07-10 RX ADMIN — METHOCARBAMOL 500 MG: 500 TABLET ORAL at 06:07

## 2025-07-10 RX ADMIN — METOPROLOL TARTRATE 12.5 MG: 25 TABLET, FILM COATED ORAL at 09:07

## 2025-07-10 RX ADMIN — FENTANYL CITRATE 100 MCG: 50 INJECTION, SOLUTION INTRAMUSCULAR; INTRAVENOUS at 01:07

## 2025-07-10 RX ADMIN — ACETAMINOPHEN 650 MG: 325 TABLET ORAL at 09:07

## 2025-07-10 NOTE — TRANSFER OF CARE
"Anesthesia Transfer of Care Note    Patient: Renata Garcia    Procedure(s) Performed: Procedure(s) (LRB):  XI ROBOTIC RATS, WITH LYMPHADENECTOMY (Left)  XI ROBOTIC RATS,WITH LOBECTOMY,LUNG - Left lower lobe (Left)  BLOCK, NERVE, INTERCOSTAL, 2 OR MORE (Left)    Patient location: PACU    Anesthesia Type: general    Transport from OR: Transported from OR on 100% O2 by closed face mask with adequate spontaneous ventilation    Post pain: adequate analgesia    Post assessment: no apparent anesthetic complications and tolerated procedure well    Post vital signs: stable    Level of consciousness: awake and alert    Nausea/Vomiting: no nausea/vomiting    Complications: none    Transfer of care protocol was followed      Last vitals: Visit Vitals  BP (!) 117/59 (BP Location: Left arm, Patient Position: Lying)   Pulse 68   Temp 36.7 °C (98.1 °F) (Temporal)   Resp 18   Ht 5' 10" (1.778 m)   Wt 56.7 kg (125 lb)   LMP  (LMP Unknown)   SpO2 98%   Breastfeeding No   BMI 17.94 kg/m²     "

## 2025-07-10 NOTE — ANESTHESIA PROCEDURE NOTES
Intubation    Date/Time: 7/10/2025 1:22 PM    Performed by: Rios Medina MD  Authorized by: Rios Medina MD    Intubation:     Induction:  Intravenous    Mask Ventilation:  Easy mask    Attempts:  1    Attempted By:  Resident anesthesiologist    Method of Intubation:  Video laryngoscopy and fiberoptic    Blade:  Tron[place] 3    Laryngeal View Grade: Grade I - full view of cords      Difficult Airway Encountered?: No      Complications:  None    Airway Device:  Double lumen tube left    Airway Device Size:  37F    Style/Cuff Inflation:  Cuffed (inflated to minimal occlusive pressure)    Inflation Amount (mL):  6    Secured at:  The lips    Placement Verified By:  Capnometry, Fiber optic visualization and Revisualization with laryngoscopy    Complicating Factors:  None    Findings Post-Intubation:  BS equal bilateral

## 2025-07-10 NOTE — BRIEF OP NOTE
[facility] ([facility])  Surgery Department  Operative Note    SUMMARY     Date of Procedure: 7/10/2025     Procedure: Procedure(s) (LRB):  XI ROBOTIC RATS, WITH LYMPHADENECTOMY (Left)  XI ROBOTIC RATS,WITH LOBECTOMY,LUNG - Left lower lobe (Left)  BLOCK, NERVE, INTERCOSTAL, 2 OR MORE (Left)     Surgeons and Role:     * Raul Alvarez MD - Primary     * Janett Baxter PA-C - Assisting     * Rios Cohen MD - Resident - Assisting      Pre-Operative Diagnosis: Malignant neoplasm of lower lobe of left lung [C34.32]    Post-Operative Diagnosis: Post-Op Diagnosis Codes:     * Malignant neoplasm of lower lobe of left lung [C34.32]    Anesthesia: General    Description of Technical Procedures: left robotic assisted superior segmentectomy with MLND. Intercostal nerve block. 24 Wolof diane drain     Significant Surgical Tasks Conducted by the Assistant(s), if Applicable: bedside assist     Estimated Blood Loss (EBL): * No values recorded between 7/10/2025  1:07 PM and 7/10/2025  5:49 PM *           Implants: * No implants in log *    Specimens:   Specimen (24h ago, onward)       Start     Ordered    07/10/25 1518  Specimen to Pathology Pulmonary and Thoracic  RELEASE UPON ORDERING        References:    Click here for ordering Quick Tip   Question:  Release to patient  Answer:  Immediate    07/10/25 1518                   ID Type Source Tests Collected by Time Destination   1 : level 8 Tissue Lung, Left SPECIMEN TO PATHOLOGY Raul Alvarez MD 7/10/2025 1515    2 : level 9 Tissue Lung, Left SPECIMEN TO PATHOLOGY Raul Alvarez MD 7/10/2025 1515    3 : level 7 Tissue Lung, Left SPECIMEN TO PATHOLOGY Raul Alvarez MD 7/10/2025 1516    4 : level 10 Tissue Lung, Left SPECIMEN TO PATHOLOGY Raul Alvarez MD 7/10/2025 1516    5 : level 5 Tissue Lung, Left SPECIMEN TO PATHOLOGY Raul Alvarez MD 7/10/2025 1527    6 : level 12 Tissue Lung, Left SPECIMEN TO PATHOLOGY Raul Alvarez MD  7/10/2025 1534    7 : level 11 Tissue Lung, Left SPECIMEN TO PATHOLOGY Raul Alvarez MD 7/10/2025 1543    8 : left superior segmentectomy Tissue Lung, Left SPECIMEN TO PATHOLOGY Raul Alvarez MD 7/10/2025 1701               Condition: Good    Disposition: PACU - hemodynamically stable.    Attestation: Op Note Attestation: A qualified Resident Physician was not available.

## 2025-07-10 NOTE — H&P
"[facility] - Surgery ([facility])  General Surgery  History & Physical    Patient Name: Renata Garcia  MRN: [id number]  Admission Date: 7/10/2025  Attending Physician: Raul Alvarez MD  Primary Care Provider: Holly Cotto DO    Patient information was obtained from patient and past medical records.     The patient has been examined and the H&P has been reviewed:    I concur with the findings and changes have been noted since the H&P was written: During bronchoscopy, patient stated that she had isolated instance of bradycardia to 50s, sinus tachycardia to 120s lasting less than 1 minute. Patient endorsed history of palpitations for several years. Referred to cardiology who gave patient event monitor. Of note, nuclear stress test negative for arrhythmias or ischemic changes with EF noted to be 70%.    Surgery risks, benefits and alternative options discussed and understood by patient/family.      Subjective:     History of Present Illness: 69 y.o. female current smoker with COPD now with LLL NSCLC. CT chest 3/10/25 with spiculated solid nodule in the left lower lobe measuring 1.2 cm and left upper lobe GGO measuring 1.2 cm. PET with left lower lobe measuring 1.1 cm, max SUV 2.6, and a ground-glass nodule in the left upper lobe measuring 1.5 cm, max SUV 1.4. Percutaneous biopsy of LLL nodule returned invasive squamous cell carcinoma. Today, she endorses SOB. She states that she can walk about a half a mile before needing to stop. SOB after 1 flight of stairs. No previous chest surgeries. Not on blood thinners. Performs ADLs independently. Endorses episodic palpitations, last heart workup in 2015. Scheduled with pulmonology for EBUS consideration on 6/26.     Discussed in tumor board with recommendation "refer to surgery for consideration of resection of biopsy proven lesion in the LLL. Consider EBUS given persistence of ground glass in BLAS"     Aside from occasional SOB and smokers cough, the patient is asymptomatic. " She denies hemoptysis,  or weight loss     PSH: None  Current smoker, 1PPD x 50 years         No current facility-administered medications on file prior to encounter.     No current outpatient medications on file prior to encounter.       Review of patient's allergies indicates:   Allergen Reactions    Codeine Nausea And Vomiting       History reviewed. No pertinent past medical history.  Past Surgical History:   Procedure Laterality Date    COLONOSCOPY N/A 09/18/2024    Procedure: COLONOSCOPY;  Surgeon: Benoit Chin MD;  Location: [facility];  Service: Endoscopy;  Laterality: N/A;  8/21 ref  by LEANDRO HERNANDEZ, PEG, instr. to portal-st  9/10/24- lvm/portal for pc. DBM  9/16 pt r/s, PEG, portal.efren    LASER ABLATION OF THE CERVIX      ROBOTIC BRONCHOSCOPY N/A 6/27/2025    Procedure: ROBOTIC BRONCHOSCOPY;  Surgeon: Raquel Yoon MD;  Location: [facility] OR [address] [place] [address];  Service: Pulmonary;  Laterality: N/A;     Family History       Problem Relation (Age of Onset)    Cancer Sister, Maternal Uncle    Hepatitis Sister          Tobacco Use    Smoking status: Every Day     Current packs/day: 0.50     Average packs/day: 0.5 packs/day for 56.5 years (28.3 ttl pk-yrs)     Types: Cigarettes     Start date: 1969     Passive exposure: Current    Smokeless tobacco: Current   Substance and Sexual Activity    Alcohol use: Yes     Comment: occ    Drug use: Never    Sexual activity: Not Currently     Review of Systems  Objective:     Vital Signs (Most Recent):  Temp: 97.9 °F (36.6 °C) (07/10/25 1017)  Pulse: 68 (07/10/25 0954)  Resp: 18 (07/10/25 0954)  BP: 135/68 (07/10/25 0957)  SpO2: 98 % (07/10/25 0954) Vital Signs (24h Range):  Temp:  [97.9 °F (36.6 °C)] 97.9 °F (36.6 °C)  Pulse:  [66-78] 68  Resp:  [18] 18  SpO2:  [96 %-98 %] 98 %  BP: (130-135)/(68-81) 135/68     Weight: 56.7 kg (125 lb)  Body mass index is 17.94 kg/m².     Physical Exam  Constitutional:       Appearance: Normal appearance.   HENT:      Head: Normocephalic and  atraumatic.   Eyes:      Extraocular Movements: Extraocular movements intact.   Cardiovascular:      Rate and Rhythm: Normal rate and regular rhythm.      Pulses: Normal pulses.      Heart sounds: Normal heart sounds.   Pulmonary:      Effort: Pulmonary effort is normal. No respiratory distress.      Breath sounds: Normal breath sounds.   Musculoskeletal:         General: Normal range of motion.      Cervical back: Normal range of motion.      Right lower leg: No edema.      Left lower leg: No edema.   Skin:     General: Skin is warm and dry.   Neurological:      General: No focal deficit present.      Mental Status: She is alert.   Psychiatric:         Mood and Affect: Mood normal.       Diagnostic Results:  PFTs 7/15/24:  FEV1 - 1.73    62.6%  DLCO - 16.85   66.1%      CT chest 3/10/25:  Interval enlargement of spiculated solid pulmonary nodule in the left lower lobe.  Recommend pulmonary medicine consultation.  Consider further evaluation with PET-CT or histologic sampling.  Stable appearance of ground-glass nodule in the left upper lobe.  Continued annual surveillance recommended.     PET 4/30/25:  In the chest, there is a hypermetabolic pulmonary nodule in the left lower lobe measuring 0.9 x 1.1 cm (image 102) with max SUV measuring 2.6. Additional ground-glass nodule in the left upper lobe measuring 1.5 x 1.4 cm (image 108) with max SUV measuring 1.4.      PFTs 6/2/25  FEV1 - 1.48    54.3%  DLCO - 14.35   56.7%   Assessment/Plan:   Stress test for cardiac clearance  Plan to OR for left robotic assisted lower lobectomy versus segmentectomy with MLND, possible thoracotomy.    Appropriate patient education regarding the gustavo-operative period as well as intraoperative details were discussed. Risks, including but not limited to, bleeding, infection, pain and anesthetic complication were discussed. Patient was given the opportunity to ask questions and to have those questions answered to their satisfaction. Patient  verbalized understanding to both procedure and associated risks. Consent was obtained.    Rios Cohen MD  General Surgery  [facility] - Surgery ([facility])

## 2025-07-10 NOTE — SUBJECTIVE & OBJECTIVE
No current facility-administered medications on file prior to encounter.     No current outpatient medications on file prior to encounter.       Review of patient's allergies indicates:   Allergen Reactions    Codeine Nausea And Vomiting       History reviewed. No pertinent past medical history.  Past Surgical History:   Procedure Laterality Date    COLONOSCOPY N/A 09/18/2024    Procedure: COLONOSCOPY;  Surgeon: Benoit Chin MD;  Location: [facility];  Service: Endoscopy;  Laterality: N/A;  8/21 ref  by LEANDRO HERNANDEZ, PEG, instr. to portal-st  9/10/24- lvm/portal for pc. DBM  9/16 pt r/s, PEG, portal.efren    LASER ABLATION OF THE CERVIX      ROBOTIC BRONCHOSCOPY N/A 6/27/2025    Procedure: ROBOTIC BRONCHOSCOPY;  Surgeon: Raquel Yoon MD;  Location: [facility] OR [address] [place] [address];  Service: Pulmonary;  Laterality: N/A;     Family History       Problem Relation (Age of Onset)    Cancer Sister, Maternal Uncle    Hepatitis Sister          Tobacco Use    Smoking status: Every Day     Current packs/day: 0.50     Average packs/day: 0.5 packs/day for 56.5 years (28.3 ttl pk-yrs)     Types: Cigarettes     Start date: 1969     Passive exposure: Current    Smokeless tobacco: Current   Substance and Sexual Activity    Alcohol use: Yes     Comment: occ    Drug use: Never    Sexual activity: Not Currently     Review of Systems  Objective:     Vital Signs (Most Recent):  Temp: 97.9 °F (36.6 °C) (07/10/25 1017)  Pulse: 68 (07/10/25 0954)  Resp: 18 (07/10/25 0954)  BP: 135/68 (07/10/25 0957)  SpO2: 98 % (07/10/25 0954) Vital Signs (24h Range):  Temp:  [97.9 °F (36.6 °C)] 97.9 °F (36.6 °C)  Pulse:  [66-78] 68  Resp:  [18] 18  SpO2:  [96 %-98 %] 98 %  BP: (130-135)/(68-81) 135/68     Weight: 56.7 kg (125 lb)  Body mass index is 17.94 kg/m².     Physical Exam  Constitutional:       Appearance: Normal appearance.   HENT:      Head: Normocephalic and atraumatic.   Eyes:      Extraocular Movements: Extraocular movements intact.    Cardiovascular:      Rate and Rhythm: Normal rate and regular rhythm.      Pulses: Normal pulses.      Heart sounds: Normal heart sounds.   Pulmonary:      Effort: Pulmonary effort is normal. No respiratory distress.      Breath sounds: Normal breath sounds.   Musculoskeletal:         General: Normal range of motion.      Cervical back: Normal range of motion.      Right lower leg: No edema.      Left lower leg: No edema.   Skin:     General: Skin is warm and dry.   Neurological:      General: No focal deficit present.      Mental Status: She is alert.   Psychiatric:         Mood and Affect: Mood normal.       Diagnostic Results:  PFTs 7/15/24:  FEV1 - 1.73    62.6%  DLCO - 16.85   66.1%      CT chest 3/10/25:  Interval enlargement of spiculated solid pulmonary nodule in the left lower lobe.  Recommend pulmonary medicine consultation.  Consider further evaluation with PET-CT or histologic sampling.  Stable appearance of ground-glass nodule in the left upper lobe.  Continued annual surveillance recommended.     PET 4/30/25:  In the chest, there is a hypermetabolic pulmonary nodule in the left lower lobe measuring 0.9 x 1.1 cm (image 102) with max SUV measuring 2.6. Additional ground-glass nodule in the left upper lobe measuring 1.5 x 1.4 cm (image 108) with max SUV measuring 1.4.      PFTs 6/2/25  FEV1 - 1.48    54.3%  DLCO - 14.35   56.7%

## 2025-07-10 NOTE — HPI
"69 y.o. female current smoker with COPD now with LLL NSCLC. CT chest 3/10/25 with spiculated solid nodule in the left lower lobe measuring 1.2 cm and left upper lobe GGO measuring 1.2 cm. PET with left lower lobe measuring 1.1 cm, max SUV 2.6, and a ground-glass nodule in the left upper lobe measuring 1.5 cm, max SUV 1.4. Percutaneous biopsy of LLL nodule returned invasive squamous cell carcinoma. Today, she endorses SOB. She states that she can walk about a half a mile before needing to stop. SOB after 1 flight of stairs. No previous chest surgeries. Not on blood thinners. Performs ADLs independently. Endorses episodic palpitations, last heart workup in 2015. Scheduled with pulmonology for EBUS consideration on 6/26.     Discussed in tumor board with recommendation "refer to surgery for consideration of resection of biopsy proven lesion in the LLL. Consider EBUS given persistence of ground glass in BLAS"     Aside from occasional SOB and smokers cough, the patient is asymptomatic. She denies hemoptysis,  or weight loss     PSH: None  Current smoker, 1PPD x 50 years       "

## 2025-07-10 NOTE — ASSESSMENT & PLAN NOTE
Plan to OR for left robotic assisted lower lobectomy versus segmentectomy with MLND, possible thoracotomy.    Appropriate patient education regarding the gustavo-operative period as well as intraoperative details were discussed. Risks, including but not limited to, bleeding, infection, pain and anesthetic complication were discussed. Patient was given the opportunity to ask questions and to have those questions answered to their satisfaction. Patient verbalized understanding to both procedure and associated risks. Consent was obtained.

## 2025-07-10 NOTE — ANESTHESIA PROCEDURE NOTES
Arterial    Diagnosis: Lung cancer    Patient location during procedure: done in OR    Staffing  Authorizing Provider: Hussain Farmer Jr., MD  Performing Provider: Rios Medina MD    Staffing  Performed by: Rios Medina MD  Authorized by: Hussain Farmer Jr., MD    Anesthesiologist was present at the time of the procedure.    Preanesthetic Checklist  Completed: patient identified, IV checked, site marked, risks and benefits discussed, surgical consent, monitors and equipment checked, pre-op evaluation, timeout performed and anesthesia consent givenArterial  Skin Prep: chlorhexidine gluconate and isopropyl alcohol  Local Infiltration: none  Orientation: left  Location: radial    Catheter Size: 20 G  Catheter placement by Ultrasound guidance. Heme positive aspiration all ports.   Vessel Caliber: patent, compressibility normal  Vascular Doppler:  not done  Needle advanced into vessel with real time Ultrasound guidance.Insertion Attempts: 1  Assessment  Dressing: secured with tape and tegaderm  Patient: Tolerated well

## 2025-07-10 NOTE — OPERATIVE NOTE ADDENDUM
Certification of Assistant at Surgery       Surgery Date: 7/10/2025     Participating Surgeons:  Surgeons and Role:     * Raul Alvarez MD - Primary     * Janett Baxter PA-C - Assisting     * Rios Cohen MD - Resident - Assisting    Procedures:  Procedure(s) (LRB):  XI ROBOTIC RATS, WITH LYMPHADENECTOMY (Left)  XI ROBOTIC RATS,WITH LOBECTOMY,LUNG - Left lower lobe (Left)  BLOCK, NERVE, INTERCOSTAL, 2 OR MORE (Left)    Assistant Surgeon's Certification of Necessity:  I understand that section 1842 (b) (6) (d) of the Social Security Act generally prohibits Medicare Part B reasonable charge payment for the services of assistants at surgery in teaching hospitals when qualified residents are available to furnish such services. I certify that the services for which payment is claimed were medically necessary, and that no qualified resident was available to perform the services. I further understand that these services are subject to post-payment review by the Medicare carrier.      Janett Baxter PA-C    07/10/2025  6:00 PM

## 2025-07-11 LAB
ABSOLUTE EOSINOPHIL (OHS): 0 K/UL
ABSOLUTE MONOCYTE (OHS): 1.17 K/UL (ref 0.3–1)
ABSOLUTE NEUTROPHIL COUNT (OHS): 21.78 K/UL (ref 1.8–7.7)
ANION GAP (OHS): 10 MMOL/L (ref 8–16)
ANION GAP (OHS): 8 MMOL/L (ref 8–16)
BASOPHILS # BLD AUTO: 0.04 K/UL
BASOPHILS NFR BLD AUTO: 0.2 %
BUN SERPL-MCNC: 13 MG/DL (ref 8–23)
BUN SERPL-MCNC: 14 MG/DL (ref 8–23)
CALCIUM SERPL-MCNC: 8.3 MG/DL (ref 8.7–10.5)
CALCIUM SERPL-MCNC: 8.3 MG/DL (ref 8.7–10.5)
CHLORIDE SERPL-SCNC: 105 MMOL/L (ref 95–110)
CHLORIDE SERPL-SCNC: 105 MMOL/L (ref 95–110)
CO2 SERPL-SCNC: 22 MMOL/L (ref 23–29)
CO2 SERPL-SCNC: 24 MMOL/L (ref 23–29)
CREAT SERPL-MCNC: 0.6 MG/DL (ref 0.5–1.4)
CREAT SERPL-MCNC: 0.6 MG/DL (ref 0.5–1.4)
ERYTHROCYTE [DISTWIDTH] IN BLOOD BY AUTOMATED COUNT: 12.9 % (ref 11.5–14.5)
GFR SERPLBLD CREATININE-BSD FMLA CKD-EPI: >60 ML/MIN/1.73/M2
GFR SERPLBLD CREATININE-BSD FMLA CKD-EPI: >60 ML/MIN/1.73/M2
GLUCOSE SERPL-MCNC: 164 MG/DL (ref 70–110)
GLUCOSE SERPL-MCNC: 165 MG/DL (ref 70–110)
HCT VFR BLD AUTO: 38.8 % (ref 37–48.5)
HGB BLD-MCNC: 12.8 GM/DL (ref 12–16)
IMM GRANULOCYTES # BLD AUTO: 0.13 K/UL (ref 0–0.04)
IMM GRANULOCYTES NFR BLD AUTO: 0.5 % (ref 0–0.5)
LYMPHOCYTES # BLD AUTO: 0.98 K/UL (ref 1–4.8)
MCH RBC QN AUTO: 29.9 PG (ref 27–31)
MCHC RBC AUTO-ENTMCNC: 33 G/DL (ref 32–36)
MCV RBC AUTO: 91 FL (ref 82–98)
NUCLEATED RBC (/100WBC) (OHS): 0 /100 WBC
PLATELET # BLD AUTO: 169 K/UL (ref 150–450)
PMV BLD AUTO: 12.4 FL (ref 9.2–12.9)
POTASSIUM SERPL-SCNC: 4.5 MMOL/L (ref 3.5–5.1)
POTASSIUM SERPL-SCNC: 4.6 MMOL/L (ref 3.5–5.1)
RBC # BLD AUTO: 4.28 M/UL (ref 4–5.4)
RELATIVE EOSINOPHIL (OHS): 0 %
RELATIVE LYMPHOCYTE (OHS): 4.1 % (ref 18–48)
RELATIVE MONOCYTE (OHS): 4.9 % (ref 4–15)
RELATIVE NEUTROPHIL (OHS): 90.3 % (ref 38–73)
SODIUM SERPL-SCNC: 137 MMOL/L (ref 136–145)
SODIUM SERPL-SCNC: 137 MMOL/L (ref 136–145)
WBC # BLD AUTO: 24.1 K/UL (ref 3.9–12.7)

## 2025-07-11 PROCEDURE — 25000242 PHARM REV CODE 250 ALT 637 W/ HCPCS: Performed by: PHYSICIAN ASSISTANT

## 2025-07-11 PROCEDURE — 85025 COMPLETE CBC W/AUTO DIFF WBC: CPT

## 2025-07-11 PROCEDURE — 94640 AIRWAY INHALATION TREATMENT: CPT

## 2025-07-11 PROCEDURE — 99900035 HC TECH TIME PER 15 MIN (STAT)

## 2025-07-11 PROCEDURE — 82310 ASSAY OF CALCIUM: CPT | Performed by: PHYSICIAN ASSISTANT

## 2025-07-11 PROCEDURE — 25000003 PHARM REV CODE 250: Performed by: PHYSICIAN ASSISTANT

## 2025-07-11 PROCEDURE — 97165 OT EVAL LOW COMPLEX 30 MIN: CPT

## 2025-07-11 PROCEDURE — 36415 COLL VENOUS BLD VENIPUNCTURE: CPT

## 2025-07-11 PROCEDURE — 20600001 HC STEP DOWN PRIVATE ROOM

## 2025-07-11 PROCEDURE — 94761 N-INVAS EAR/PLS OXIMETRY MLT: CPT

## 2025-07-11 PROCEDURE — 63600175 PHARM REV CODE 636 W HCPCS: Performed by: PHYSICIAN ASSISTANT

## 2025-07-11 PROCEDURE — 36415 COLL VENOUS BLD VENIPUNCTURE: CPT | Performed by: PHYSICIAN ASSISTANT

## 2025-07-11 PROCEDURE — 63600175 PHARM REV CODE 636 W HCPCS: Mod: JZ,TB

## 2025-07-11 PROCEDURE — 97161 PT EVAL LOW COMPLEX 20 MIN: CPT

## 2025-07-11 PROCEDURE — 97116 GAIT TRAINING THERAPY: CPT

## 2025-07-11 PROCEDURE — 97530 THERAPEUTIC ACTIVITIES: CPT

## 2025-07-11 RX ORDER — OXYCODONE HYDROCHLORIDE 5 MG/1
5 TABLET ORAL EVERY 4 HOURS PRN
Refills: 0 | Status: DISCONTINUED | OUTPATIENT
Start: 2025-07-11 | End: 2025-07-13 | Stop reason: HOSPADM

## 2025-07-11 RX ORDER — LIDOCAINE 50 MG/G
1 PATCH TOPICAL
Status: DISCONTINUED | OUTPATIENT
Start: 2025-07-11 | End: 2025-07-13 | Stop reason: HOSPADM

## 2025-07-11 RX ORDER — GABAPENTIN 300 MG/1
300 CAPSULE ORAL 2 TIMES DAILY
Status: DISCONTINUED | OUTPATIENT
Start: 2025-07-11 | End: 2025-07-13 | Stop reason: HOSPADM

## 2025-07-11 RX ORDER — KETOROLAC TROMETHAMINE 15 MG/ML
15 INJECTION, SOLUTION INTRAMUSCULAR; INTRAVENOUS EVERY 6 HOURS
Status: COMPLETED | OUTPATIENT
Start: 2025-07-11 | End: 2025-07-12

## 2025-07-11 RX ORDER — KETOROLAC TROMETHAMINE 15 MG/ML
15 INJECTION, SOLUTION INTRAMUSCULAR; INTRAVENOUS EVERY 6 HOURS
Status: DISCONTINUED | OUTPATIENT
Start: 2025-07-11 | End: 2025-07-11

## 2025-07-11 RX ADMIN — LIDOCAINE 1 PATCH: 50 PATCH CUTANEOUS at 01:07

## 2025-07-11 RX ADMIN — SENNOSIDES AND DOCUSATE SODIUM 1 TABLET: 50; 8.6 TABLET ORAL at 08:07

## 2025-07-11 RX ADMIN — ACETAMINOPHEN 650 MG: 325 TABLET ORAL at 05:07

## 2025-07-11 RX ADMIN — KETOROLAC TROMETHAMINE 15 MG: 15 INJECTION INTRAMUSCULAR at 06:07

## 2025-07-11 RX ADMIN — CEFAZOLIN 2 G: 2 INJECTION, POWDER, FOR SOLUTION INTRAMUSCULAR; INTRAVENOUS at 02:07

## 2025-07-11 RX ADMIN — METHOCARBAMOL 500 MG: 500 TABLET ORAL at 09:07

## 2025-07-11 RX ADMIN — METOPROLOL TARTRATE 12.5 MG: 25 TABLET, FILM COATED ORAL at 09:07

## 2025-07-11 RX ADMIN — ACETAMINOPHEN 650 MG: 325 TABLET ORAL at 01:07

## 2025-07-11 RX ADMIN — KETOROLAC TROMETHAMINE 15 MG: 15 INJECTION INTRAMUSCULAR at 11:07

## 2025-07-11 RX ADMIN — METHOCARBAMOL 500 MG: 500 TABLET ORAL at 12:07

## 2025-07-11 RX ADMIN — LEVALBUTEROL 1.25 MG: 1.25 SOLUTION, CONCENTRATE RESPIRATORY (INHALATION) at 09:07

## 2025-07-11 RX ADMIN — METOPROLOL TARTRATE 12.5 MG: 25 TABLET, FILM COATED ORAL at 08:07

## 2025-07-11 RX ADMIN — ENOXAPARIN SODIUM 40 MG: 40 INJECTION SUBCUTANEOUS at 04:07

## 2025-07-11 RX ADMIN — ACETAMINOPHEN 650 MG: 325 TABLET ORAL at 09:07

## 2025-07-11 RX ADMIN — METHOCARBAMOL 500 MG: 500 TABLET ORAL at 08:07

## 2025-07-11 RX ADMIN — GABAPENTIN 300 MG: 300 CAPSULE ORAL at 08:07

## 2025-07-11 RX ADMIN — METHOCARBAMOL 500 MG: 500 TABLET ORAL at 04:07

## 2025-07-11 RX ADMIN — SENNOSIDES AND DOCUSATE SODIUM 1 TABLET: 50; 8.6 TABLET ORAL at 09:07

## 2025-07-11 RX ADMIN — LEVALBUTEROL 1.25 MG: 1.25 SOLUTION, CONCENTRATE RESPIRATORY (INHALATION) at 10:07

## 2025-07-11 NOTE — OP NOTE
Thoracic Surgery Operative Report       Date: 07/10/25    Preoperative Diagnosis: Left Lower Lobe Non-small Cell Lung Cancer.       Postoperative Diagnosis: The Same.       Procedures Performed:   Left Robotic-Assisted superior segmentectomy  Mediastinal Lymph Node Dissection.   3.   Intercostal Nerve Blocks (T4-9)    Intraoperative Findings:       Surgeon: Raul Alvarez    Fellow/Resident: Rios Cohen M.D.       Assistant:   Bedside assistant attestation:  Janett Baxter PA-C functioned as a bedside 1st assistant.  Her duties included robotic docking, instrument exchange, and specimen retrieval throughout the entire surgery.  There was no qualified resident available to function as a bedside first assistant given the case complexity and extent of duties described above.      Anesthesia Type: General Anesthesia.       Estimated Blood Loss: 25 mL.      Intravenous Fluid: See Anesthesia Record.       Specimens:   Level 5, 7,8,9, 10, 11, 12 Lymph nodes   Left superior segment    Drains: 24 Occitan Chest Tube.      Complications: None.       Disposition: The patient tolerated the operation well and was transported the postanesthesia care unit in stable condition.       Indication for the procedure:   Mrs Garcia is a 69 year old current smoker, who presented with a left lower lobe lung nodule, consistent with clinical stage 1 lung cancer of left lower lobe as well a lepidic cancer of the Left upper lobe.  After extensive medical & oncological work-up and discussion at multi-disciplinary cancer conference, she was deemed a suitable surgical candidate. The patient presents today for resection. Given her marginal PFTs she was offered a superior segmentectomy. I discussed with the patient the risks, benefits and alternatives to surgery. Specifically I informed her of the risk of bleeding, infection, injury to nearby structures within the chest and conversion to an open procedure. I discussed with her that although  there does not appear to be evidence of regional or distant disease there may evidence of spread at the time of surgery or when the specimen is examined by pathologist. Following this conversation the patient consented to surgery.     Description of the operation:   Mrs. Garcia was transferred to the operating room and placed supine on the operating room table. The patient was intubated with a single-lumen endotracheal tube by anesthesia. General anesthesia was induced. Intravenous lines and an arterial line were placed the anesthesia team. A del rosario catheter was inserted.  Compression boots were placed in the lower extremities with DVT prophylaxis. The patient received IV antibiotics prior to the incision for antimicrobial prophylaxis.      The patient was then intubated with a double-lumen endotracheal tube and positioned in the right lateral decubitus position with the left chest facing upward. . The left chest was prepped and draped in the standard sterile surgical fashion. A total of three 8 mm trocars and one 12mm robotic trocar was placed along the 8th intercostal space approximately 8-10 cm apart.  A 12 mm Air Seal Port was placed in the 10th intercostal space. The 8mm 30 degree robotic camera was placed thorough the posterior axillary line trocar and the left hemithorax was inspected and there was no evidence of pleural effusion or pleural dissemination. The left lower lobe nodule was not readily appreciated. The Mr. Numberi Xi robot was then docked to the robotic ports. A Cadiere grasper, a tip up fenestrated lung grasper, and a long bipolar forcep were placed through the ports.     The inferior pulmonary ligament was incised and the the mediastinal pleura was incised posteriorly to the level of the aortic isthmus. Level 7, 8, 9 and 10(along the main PA)  were identified, removed en bloc and sent for permanent histology. Next a level 11 lymph was identified between the main pulmonary artery and left main  bronchus. This lymph node was dissected safely and similarly sent for permanent histology. Additional adventitial tissue was dissected off of the pulmonary artery to create a posterior dissection window.  The superior segmental vein and superior segmental bronchus were both identified, dissected but not divided at this point.     Next the lung was reflected caudally which facilitated dissection of the AP window, and a level 5 lymph node was obtained and sent for permanent histology.  The major fissure was then dissected  to expose the ongoing pulmonary artery. There was a superficial level 12 node which was removed. Next a plane was developed above the ongoing pulmonary artery and this plane was connected to the posterior hilum using cautery. With the posterior fissure divided the superior segmental artery was clearly visualized.   This vessel was divided with a single firing of the robotic stapling devise, white load. A level 12 lymph node, in between the superior segmental artery and basilar trunk was removed.     The lung was once again reflected anteriorly to expose the posterior hilum. The superior segmental vein off of the inferior pulmonary vein was divided with a single application of the robotic stapling device,white load. Next the the superior segmental bronchus off of the lower lobe bronchus was circumferentially dissected. A clamp test was performed confirming patency of both the basilar bronchus and the left upper lobe.   Subsequently, 10mg of ICG was administered intravenously and the line of demarcation of devascularized lung was marked with the bipolar. The intersegmental plane was then divided along the marked line of demarcation using a robotic stapler, taking care to incorporate the bronchovascular structure to the superior segment. The  specimen was then placed in an endoscopic retrieval bag and removed through the 10th intercostal space Air seal port which was enlarged approximately 4 cm. The  specimen was inspected on the back table and the nodule was present and had a gross negative margin.       The left chest was then meticulously suctioned. There was no evidence of bleeding. Intercostal nerve blocks were performed with Exparel injections posteriorly from T4-9. A 24 Irish chest tube was placed in the 8th intercostal space and secured with a #0 Ethibond suture. The left upper lobe was then re-expanded under direct vision. The robotic incisions were closed in 3 layers. The muscle layers were reapproximated with a running #0 Vicryl suture. The adipose tissue layers were reapproximated a running 2-0 Vicryl suture. The skin was reapproximated a running 4-0 monocryl suture. Sterile dressings were placed on the incisions.       The sponge and instrument counts was correct x 2. I was present and participated for the entire procedure. The patient was transported to the post anesthesia care unit in stable condition. The patient was extubated in the operating room and was transported to the PACU by the anesthesia team. There were no intraoperative surgical complications.      Thoracic (Pulmonary) Cancer - Synoptic Operative Report Summary    Side of surgery Left   Surgical approach Robotic   Tumor type NSCLC - Squamous   Which lymph nodes were submitted as separate specimens? 5 - Subaortic, 7 - Subcarinal, 8L - Paraesophageal (left), 9L - Pulmonary ligament (left), 10L - Hilar (left), 11L - Interlobar (left), and 12L - Lobar (left)   What pre-operative therapies did the patient receive? None

## 2025-07-11 NOTE — PT/OT/SLP EVAL
Occupational Therapy   Co-Evaluation  Co-eval/treatment performed due to suspected deficits requiring two skilled therapists to appropriately and safely assess patient's strength and endurance while facilitating functional tasks in addition to accommodating for patient's activity tolerance.        Name: Renata Garcia  MRN: [id number]  Admitting Diagnosis: <principal problem not specified>  Recent Surgery: Procedure(s) (LRB):  XI ROBOTIC RATS, WITH LYMPHADENECTOMY (Left)  XI ROBOTIC RATS,WITH LOBECTOMY,LUNG - Left lower lobe (Left)  BLOCK, NERVE, INTERCOSTAL, 2 OR MORE (Left) 1 Day Post-Op    Recommendations:     Discharge Recommendations: Low Intensity Therapy  Discharge Equipment Recommendations:  walker, rolling  Barriers to discharge:  None    Assessment:     Renata Garcia is a 69 y.o. female with a medical diagnosis of <principal problem not specified>.  She presents with the following performance deficits affecting function: impaired balance, impaired self care skills, impaired functional mobility, decreased safety awareness, pain, impaired endurance.      Pt agreeable to therapy and tolerated fairly. Pt was cooperative & demonstrated good effort throughout the therapy session. However, pt appears to be lacking safety awareness evidenced by wanting to ambulate further distance despite being very unsteady during ambulation. Pt displayed good strength but is a little impulsive and makes quick movements.    Pt remains limited in ADLs, functional mobility, and functional transfers and is currently not performing tasks at [facility]. Pt would continue to benefit from skilled OT services to maximize functional independence with ADLs and functional mobility, reduce caregiver burden, and facilitate safe discharge in the least restrictive environment.      Rehab Prognosis: Good; patient would benefit from acute skilled OT services to address these deficits and reach maximum level of function.       Plan:     Patient to be seen    to address the above listed problems via self-care/home management, therapeutic activities, therapeutic exercises  Plan of Care Expires: 08/11/25  Plan of Care Reviewed with: patient    Subjective     Chief Complaint: L upper abdomen pain  Patient/Family Comments/goals: regain PLOF    Occupational Profile:  Living Environment: pt lives with  in a H with 1 FELICITY. Bathroom: tub/shower  Previous level of function: Independent  Roles and Routines: pt enjoys playing and spending time with her grandchildren. Pt also likes gardening and was a  for 50 yrs  Equipment Used at Home: none  Assistance upon Discharge:     Pain/Comfort:  Pain Rating 1: 8/10  Location - Side 1: Left  Location - Orientation 1: upper  Location 1: abdomen  Pain Addressed 1: Distraction    Patients cultural, spiritual, Voodoo conflicts given the current situation: no    Objective:     Communicated with: nurse prior to session.  Patient found HOB elevated with peripheral IV, chest tube upon OT entry to room.    General Precautions: Standard, fall  Orthopedic Precautions: N/A  Braces: N/A  Respiratory Status: Room air    Occupational Performance:    Bed Mobility:    Patient completed Scooting to EOB with stand by assistance    Patient completed Supine to Sit with stand by assistance    Functional Mobility/Transfers:   Patient completed Sit -> Stand Transfer (from EOB) with very close stand by assistance  with  no assistive device     Patient completed Chair Transfer with contact guard assistance with  rolling walker    Functional Mobility: pt ambulated in room & hallway to simulate household environment & community distances to improve overall strength, coordination, activity tolerance & safety awareness required for participation/independence wit MRADLs/IADLs  Pt ambulated approximately 140 ft with CGA using RW  Gait belt was utilized due to unsteadiness, requiring CGA (from both therapists! at all times)  Chair follow was also  provided to maximize pt safety     Activities of Daily Living:  Upper Body Dressing: minimum assistance to don gown over back while seated EOB    Lower Body Dressing: stand by assistance to dof/don sock while seated EOB  Pt utilized figure 4 technique    Cognitive/Visual Perceptual:  Cognitive/Psychosocial Skills:     -       Follows Commands/attention:Follows multistep  commands    Physical Exam:  Upper Extremity Range of Motion:     -       Right Upper Extremity: WFL  -       Left Upper Extremity: WFL  Upper Extremity Strength:    -       Right Upper Extremity: WFL  -       Left Upper Extremity: WFL   Strength:    -       Right Upper Extremity: WFL  -       Left Upper Extremity: WFL  Fine Motor Coordination:    -       Intact  Left hand thumb/finger opposition skills and Right hand thumb/finger opposition skills  Gross motor coordination:   WFL    AMPAC 6 Click ADL:  AMPAC Total Score: 21    Treatment & Education:  -Education on task modification to maximize safety and (I) during ADLs and mobility/transfers  -Education on importance of OOB activity to improve/maintain overall strength, activity tolerance and promote recovery  -Pt educated to call for assistance and to transfer with hospital staff only  -Provided education regarding role of OT & POC with pt verbalizing understanding.   Pt had no further questions & when asked whether there were any concerns pt reported none.     Patient left up in chair with all lines intact, call button in reach, nurse notified by PT, and MD present in room upon OT departure    GOALS:   Multidisciplinary Problems       Occupational Therapy Goals          Problem: Occupational Therapy    Goal Priority Disciplines Outcome Interventions   Occupational Therapy Goal     OT, PT/OT Progressing    Description: Goals to be met by: 07/25/2025     Patient will increase functional independence with ADLs by performing:    UE Dressing with Modified [place].  LE Dressing with Modified  [place].  Grooming while standing at sink with Modified [place].  Toileting from toilet with Modified [place] for hygiene and clothing management.   Supine to sit with Modified [place].  Step transfer with Modified [place]  Toilet transfer to toilet with Modified [place].                         DME Justifications:   Renata's mobility limitation cannot be sufficiently resolved by the use of a cane. Her functional mobility deficit can be sufficiently resolved with the use of a Rolling Walker. Patient's mobility limitation significantly impairs their ability to participate in one of more activities of daily living.  The use of a RW will significantly improve the patient's ability to participate in MRADLS and the patient will use it on regular basis in the home.    History:     History reviewed. No pertinent past medical history.      Past Surgical History:   Procedure Laterality Date    COLONOSCOPY N/A 09/18/2024    Procedure: COLONOSCOPY;  Surgeon: Benoit Chin MD;  Location: [facility];  Service: Endoscopy;  Laterality: N/A;  8/21 ref  by LEANDRO HERNANDEZ, PEG, instr. to portal-st  9/10/24- lvm/portal for pc. [facility]  9/16 pt r/s, PEG, [email or web address]    INJECTION OF ANESTHETIC AGENT AROUND MULTIPLE INTERCOSTAL NERVES Left 7/10/2025    Procedure: BLOCK, NERVE, INTERCOSTAL, 2 OR MORE;  Surgeon: Raul Alvarez MD;  [facility];  Service: Thoracic;  Laterality: Left;    LASER ABLATION OF THE CERVIX      ROBOTIC BRONCHOSCOPY N/A 6/27/2025    Procedure: ROBOTIC BRONCHOSCOPY;  Surgeon: Raquel Yoon MD;  [facility];  Service: Pulmonary;  Laterality: N/A;    XI ROBOTIC RATS, WITH LYMPHADENECTOMY Left 7/10/2025    Procedure: XI ROBOTIC RATS, WITH LYMPHADENECTOMY;  Surgeon: Raul Alvarez MD;  [facility];  Service: Thoracic;  Laterality: Left;    XI ROBOTIC RATS,WITH LOBECTOMY,LUNG Left 7/10/2025    Procedure: XI ROBOTIC RATS,WITH LOBECTOMY,LUNG -  Left lower lobe;  Surgeon: Raul Alvarez MD;  Location: [facility] OR [address] [place] [address];  Service: Thoracic;  Laterality: Left;       Time Tracking:     OT Date of Treatment: 07/11/25  OT Start Time: 0819  OT Stop Time: 0842  OT Total Time (min): 23 min    Billable Minutes:Evaluation 10  Therapeutic Activity 13    7/11/2025

## 2025-07-11 NOTE — NURSING TRANSFER
Nursing Transfer Note      7/10/2025   7:51 PM    Nurse giving handoff:KARLY rGay  Nurse receiving handoff:KARLY Lawrence    Reason patient is being transferred: s/p RATS    Transfer To: 1052    Transfer via bed    Transfer with 2L NC, cardiac monitoring    Transported by RN    Transfer Vital Signs:SEE FLOWSHEET    Telemetry: Box Number [id number]  Order for Tele Monitor? Yes    Additional Lines: Oxygen and Chest Tube    Medicines sent: none    Any special needs or follow-up needed: routine    Patient belongings transferred with patient: Yes    Chart send with patient: Yes    Notified: spouse    Patient reassessed at: 7/10/25@1569

## 2025-07-11 NOTE — PLAN OF CARE
"[facility] Plan of Care Note    Dx:   Malignant neoplasm of lower lobe of left lung [C34.32]  Malignant neoplasm of left lung [C34.92]    Shift Events: 2100-Received from PACU   via bed,  with chest tube  on LL  chest, AAO x4, placed comfortably.VS checked and recorded.Continued POC.  -Able to urinate post urinary  catheter removal.    Goals of Care: Chest tube care , Pain control and safety    Neuro: AO x4    Vital Signs: /69   Pulse 72   Temp 97.5 °F (36.4 °C) (Oral)   Resp 18   Ht 5' 10" (1.778 m)   Wt 56.7 kg (125 lb)   LMP  (LMP Unknown)   SpO2 96%   Breastfeeding No   BMI 17.94 kg/m²     Respiratory:  2L via NC    Diet: Diet Clear Liquid,Diet Adult Regular      Is patient tolerating current diet?  YES    GTTS:  NONE    Urine Output/Bowel Movement:   I/O this shift:  In: -   Out: 65 [Chest Tube:65]  Last Bowel Movement: 07/09/25      Drains/Tubes/Tube Feeds (include total output/shift):   I/O this shift:  In: -   Out: 65 [Chest Tube:65]      Lines: piv X2      Accuchecks: NONE    Skin:  See LDA    Fall Risk Score: 3    Activity level?  sba    Any scheduled procedures?  none    Any safety concerns?  Falls    Other:     Problem: Adult Inpatient Plan of Care  Goal: Plan of Care Review  Outcome: Progressing  Goal: Patient-Specific Goal (Individualized)  Outcome: Progressing  Goal: Absence of Hospital-Acquired Illness or Injury  Outcome: Progressing  Goal: Optimal Comfort and Wellbeing  Outcome: Progressing  Goal: Readiness for Transition of Care  Outcome: Progressing     Problem: Wound  Goal: Optimal Coping  Outcome: Progressing  Goal: Optimal Functional Ability  Outcome: Progressing  Goal: Absence of Infection Signs and Symptoms  Outcome: Progressing  Goal: Improved Oral Intake  Outcome: Progressing  Goal: Optimal Pain Control and Function  Outcome: Progressing  Goal: Skin Health and Integrity  Outcome: Progressing  Goal: Optimal Wound Healing  Outcome: Progressing     Problem: Infection  Goal: Absence " of Infection Signs and Symptoms  Outcome: Progressing     Problem: Fall Injury Risk  Goal: Absence of Fall and Fall-Related Injury  Outcome: Progressing

## 2025-07-11 NOTE — ASSESSMENT & PLAN NOTE
Patient is a 69F who is s/p L RATS with BLAS segmentectomy 7/10.    - Chest tube to water seal. Will do clamp trial today  - Daily CXR.   - Daily lytes.   - DVT ppx  - MMPC  - Ambulate. OOB  - Bowel Regimen  - Dispo: Continue inpatient care. Possibly d/c this pm vs tomorrow am.

## 2025-07-11 NOTE — PT/OT/SLP EVAL
Physical Therapy Co-Evaluation and Co-Treatment    Patient Name:  Renata Garcia   MRN:  [id number]  Admit Date: 7/10/2025  Admitting Diagnosis:  <principal problem not specified>   Length of Stay: 1 days  Recent Surgery: Procedure(s) (LRB):  XI ROBOTIC RATS, WITH LYMPHADENECTOMY (Left)  XI ROBOTIC RATS,WITH LOBECTOMY,LUNG - Left lower lobe (Left)  BLOCK, NERVE, INTERCOSTAL, 2 OR MORE (Left) 1 Day Post-Op    Recommendations:     Discharge Recommendations: No Therapy Indicated  Discharge Equipment Recommendations: none   Barriers to discharge: None    Appropriate transfer level with nursing staff: walk 3x per day with 1 person assist    Plan:     During this hospitalization, patient to be seen 3 x/week to address the identified rehab impairments via gait training, therapeutic activities, therapeutic exercises, neuromuscular re-education and progress towards the established goals.  Plan of Care Expires:  08/10/25  Plan of Care Reviewed with: patient    Assessment     Renata Garcia is a 69 y.o. female admitted with a medical diagnosis of <principal problem not specified>. Pt presents up in bed and agreeable to therapy session. Pt was impulsive and moved quickly throughout session and noted unclear vision and periodic dizziness. Pt performed bed mobility, stand, and walking in hallway without assistance. Hallway walking was terminated due to reports of SOB and unclear vision. PTA pt was very active, watching grandchildren daily. Pt would benefit from acute physical therapy to progress toward PLOF.     Problem List: impaired endurance, gait instability, pain, decreased safety awareness.  Rehab Prognosis: Good; patient would benefit from acute skilled PT services to address these deficits and reach maximum level of function.      Goals:   Multidisciplinary Problems       Physical Therapy Goals          Problem: Physical Therapy    Goal Priority Disciplines Outcome Interventions   Physical Therapy Goal     PT, PT/OT  "Progressing    Description: Goals to be met by: 8/10/25     Patient will increase functional independence with mobility by performin. Gait  x 300 feet with Stand-by Assistance using LRAD.                          Subjective     RN Karen notified prior to session. No one present upon PT entrance into room. Patient agreeable to PT evaluation.    Chief Complaint: L shoulder and side pain    Patient/Family Comments/goals: "I've been feeling shaky since the surgery and I can't type on my phone."  Pain/Comfort:  Pain Rating 1: 8/10  Location - Side 1: Left  Location - Orientation 1: generalized  Location 1: shoulder  Pain Addressed 1: Reposition, Distraction  Pain Rating 2: 8/10  Location - Side 2: Left  Location - Orientation 2: generalized  Location 2: other (see comments) (side (chest tube incision))  Pain Addressed 2: Reposition, Distraction    Social History:  Residence: Patient lives with their spouse in a single story house with number of outside stair(s): 0. Pt's bathroom has a t/s with a shower chair.  Equipment Owned (not using): shower chair  Equipment Used: none  Prior level of function:  Prior to admission, patient was independent  Work: Retired. (From welding), [employer] daily  Drive: yes.   Hobbies: Playing football with grandson, gardening  Assistance Upon Discharge: significant other    Objective:     Additional staff present: OT and Supervising PT    Patient found HOB elevated with: chest tube, telemetry, pulse ox (continuous), SCD     General Precautions: Standard, Cardiac fall   Orthopedic Precautions:N/A   Braces: N/A   Body mass index is 17.94 kg/m².  Oxygen Device: Nasal Cannula 0.25L  Vitals: /70   Pulse 74   Temp 98.2 °F (36.8 °C) (Oral)   Resp 15   Ht 5' 10" (1.778 m)   Wt 56.7 kg (125 lb)   LMP  (LMP Unknown)   SpO2 96%   Breastfeeding No   BMI 17.94 kg/m²     Exams:  Cognition:   Oriented X 4   Patient is oriented to Person, Place, Time, Situation  Command " following: Follows multistep verbal commands  Fluency: clear/fluent  Hearing: Intact  Vision:  Intact  Skin Integrity: Visible skin intact  Postural Assessment: no deviations noted  Physical Exam:    Left UE Left LE Right UE Right LE   Edema absent absent absent absent   ROM AROM WFL AROM WFL AROM WFL AROM WFL   Strength within normal limits within normal limits within normal limits within normal limits   Sensation intact to light touch intact to light touch intact to light touch intact to light touch   Coordination normal not tested normal not tested     Outcome Measures:  AM-PAC 6 CLICK MOBILITY  Turning over in bed (including adjusting bedclothes, sheets and blankets)?: 4  Sitting down on and standing up from a chair with arms (e.g., wheelchair, bedside commode, etc.): 4  Moving from lying on back to sitting on the side of the bed?: 4  Moving to and from a bed to a chair (including a wheelchair)?: 4  Need to walk in hospital room?: 3  Climbing 3-5 steps with a railing?: 3  Basic Mobility Total Score: 22     Functional Mobility:    Bed Mobility:   Supine to Sit: stand by assistance for safety; to R side of bed  Scooting anteriorly to EOB to have both feet planted on floor: stand by assistance     Sitting Balance at Edge of Bed:  Static Sitting Balance: Good : able to maintain balance against moderate resistance  Dynamic Sitting Balance: Good : able to sit unsupported and weight shift across midline moderately  Assistance Level Required: Stand-by Assistance    Transfers:   Sit <> Stand Transfer: stand by assistance with no AD. [id number] from EOB  Stand <> Sit Transfer: stand by assistance with rolling walker. [id number] from bedside chair    Standing Balance:  Static Standing Balance: Fair : able to stand unsupported without UE support and without LOB for 1 minute  Dynamic Standing Balance: Fair : stand independently unsupported, weight shift, and reach ipsilaterally. LOB noted when crossing midline.  Assistance Level  Required: Contact Guard Assistance  Patient used: rolling walker       Gait:   Patient ambulated: ~140'   Patient required: contact guard assistance  Patient used:  rolling walker   Gait Pattern observed: reciprocal gait  Gait Deviation(s): unsteady gait  Impairments due to: impaired balance and potentially from visual deficits  all lines remained intact throughout ambulation trial  Chair follow for patient safety  Gait belt utilized    Education:  Time provided for education, counseling and discussion of health disposition in regards to patient's current status  All questions answered within PT scope of practice and to patient's satisfaction  PT role in POC to address current functional deficits  Call nursing/pct to transfer to chair/use bathroom. Pt stated understanding.  Whiteboard updated with therapist name and pt's current mobility status documented above      DME Justifications:  No DME recommended requiring DME justifications    Patient left up in chair with all lines intact and call button in reach.    History:     History reviewed. No pertinent past medical history.    Past Surgical History:   Procedure Laterality Date    COLONOSCOPY N/A 09/18/2024    Procedure: COLONOSCOPY;  Surgeon: Benoit Chin MD;  Location: [facility];  Service: Endoscopy;  Laterality: N/A;  8/21 ref  by LEANDRO HERNANDEZ, PEG, instr. to portal-st  9/10/24- lvm/portal for pc. DB  9/16 pt r/s, PEG, [email or web address]    INJECTION OF ANESTHETIC AGENT AROUND MULTIPLE INTERCOSTAL NERVES Left 7/10/2025    Procedure: BLOCK, NERVE, INTERCOSTAL, 2 OR MORE;  Surgeon: Raul Alvarez MD;  Location: [facility] OR [address] [place] [address];  Service: Thoracic;  Laterality: Left;    LASER ABLATION OF THE CERVIX      ROBOTIC BRONCHOSCOPY N/A 6/27/2025    Procedure: ROBOTIC BRONCHOSCOPY;  Surgeon: Raquel Yoon MD;  Location: [facility] OR [address] [place] [address];  Service: Pulmonary;  Laterality: N/A;    XI ROBOTIC RATS, WITH LYMPHADENECTOMY Left 7/10/2025    Procedure: XI ROBOTIC RATS, WITH  LYMPHADENECTOMY;  Surgeon: Raul Alvarez MD;  Location: [facility] OR [facility];  Service: Thoracic;  Laterality: Left;    XI ROBOTIC RATS,WITH LOBECTOMY,LUNG Left 7/10/2025    Procedure: XI ROBOTIC RATS,WITH LOBECTOMY,LUNG - Left lower lobe;  Surgeon: Raul Alvarez MD;  [facility];  Service: Thoracic;  Laterality: Left;       Family History   Problem Relation Name Age of Onset    Cancer Sister      Hepatitis Sister      Cancer Maternal Uncle         Social History     Socioeconomic History    Marital status:    Tobacco Use    Smoking status: Every Day     Current packs/day: 0.50     Average packs/day: 0.5 packs/day for 56.5 years (28.3 ttl pk-yrs)     Types: Cigarettes     Start date: 1969     Passive exposure: Current    Smokeless tobacco: Current   Substance and Sexual Activity    Alcohol use: Yes     Comment: occ    Drug use: Never    Sexual activity: Not Currently     Social Drivers of Health     Financial Resource Strain: Low Risk  (4/23/2025)    Overall Financial Resource Strain (CARDIA)     Difficulty of Paying Living Expenses: Not hard at all   Food Insecurity: No Food Insecurity (4/23/2025)    Hunger Vital Sign     Worried About Running Out of Food in the Last Year: Never true     Ran Out of Food in the Last Year: Never true   Transportation Needs: No Transportation Needs (4/23/2025)    PRAPARE - Transportation     Lack of Transportation (Medical): No     Lack of Transportation (Non-Medical): No   Stress: Stress Concern Present (4/23/2025)    [employer] - Occupational Stress Questionnaire     Feeling of Stress : To some extent   Housing Stability: Low Risk  (4/23/2025)    Housing Stability Vital Sign     Unable to Pay for Housing in the Last Year: No     Homeless in the Last Year: No       Time Tracking:     PT Received On: 07/11/25  PT Start Time: 0819     PT Stop Time: 0842  PT Total Time (min): 23 min     Billable Minutes: Evaluation 10 and Gait  Training 13    7/11/2025

## 2025-07-11 NOTE — PLAN OF CARE
[facility] Plan of Care Note  Dx pulmonary nodules    Shift Events pain control throughout the shift    Goals of Care: pain control./safety    Neuro: AOx4    Vital Signs: stable    Respiratory: RA    Diet: regular    Is patient tolerating current diet? yes    GTTS: no    Urine Output/Bowel Movement:     Drains/Tubes/Tube Feeds (include total output/shift):     Lines: 18g LFA,16g R hand      Accuchecks:0    Skin: L upper chest puncture    Fall Risk Score: 10    Activity level? Assist x1    Any scheduled procedures? no    Any safety concerns? Fall precaution    Other:

## 2025-07-11 NOTE — PLAN OF CARE
Problem: Occupational Therapy  Goal: Occupational Therapy Goal  Description: Goals to be met by: 07/25/2025     Patient will increase functional independence with ADLs by performing:    UE Dressing with Modified [place].  LE Dressing with Modified [place].  Grooming while standing at sink with Modified [place].  Toileting from toilet with Modified [place] for hygiene and clothing management.   Supine to sit with Modified [place].  Step transfer with Modified [place]  Toilet transfer to toilet with Modified [place].    Outcome: Progressing       OT eval complete & goals established.

## 2025-07-11 NOTE — SUBJECTIVE & OBJECTIVE
Interval History: Naeon. AFVSS. On 1L NC this am with sats >95%.     Medications:  Continuous Infusions:  Scheduled Meds:   acetaminophen  650 mg Oral Q8H    enoxparin  40 mg Subcutaneous Daily    gabapentin  300 mg Oral QHS    levalbuterol  1.25 mg Nebulization Q12H    methocarbamoL  500 mg Oral QID    metoprolol tartrate  12.5 mg Oral BID    polyethylene glycol  17 g Oral Daily    senna-docusate  1 tablet Oral BID     PRN Meds:  Current Facility-Administered Medications:     bisacodyL, 10 mg, Rectal, Daily PRN    ondansetron, 8 mg, Oral, Q8H PRN    oxyCODONE, 5 mg, Oral, Q4H PRN    oxyCODONE, 10 mg, Oral, Q4H PRN     Review of patient's allergies indicates:   Allergen Reactions    Codeine Nausea And Vomiting     Objective:     Vital Signs (Most Recent):  Temp: 97.8 °F (36.6 °C) (07/11/25 0343)  Pulse: 66 (07/11/25 0659)  Resp: 15 (07/11/25 0343)  BP: 132/67 (07/11/25 0343)  SpO2: 96 % (07/11/25 0343) Vital Signs (24h Range):  Temp:  [97.5 °F (36.4 °C)-98.1 °F (36.7 °C)] 97.8 °F (36.6 °C)  Pulse:  [61-78] 66  Resp:  [12-19] 15  SpO2:  [92 %-100 %] 96 %  BP: (109-135)/(59-70) 132/67  Arterial Line BP: (101-126)/(43-58) 126/57     Intake/Output - Last 3 Shifts         07/09 0700  07/10 0659 07/10 0700  07/11 0659 07/11 0700 07/12 0659    P.O.  400     IV Piggyback  1600     Total Intake(mL/kg)  2000 (35.3)     Urine (mL/kg/hr)  550     Stool  0     Chest Tube  175     Total Output  725     Net  +1275            Unmeasured Stool Occurrence  0 x             SpO2: 96 %        Physical Exam  Constitutional:       Appearance: Normal appearance.   HENT:      Head: Normocephalic and atraumatic.   Eyes:      Extraocular Movements: Extraocular movements intact.      Conjunctiva/sclera: Conjunctivae normal.   Cardiovascular:      Rate and Rhythm: Normal rate.   Pulmonary:      Effort: Pulmonary effort is normal. No respiratory distress.   Chest:      Comments: L chest tube in place tidaling no air leak  Abdominal:       General: Abdomen is flat. There is no distension.   Skin:     General: Skin is warm and dry.   Neurological:      General: No focal deficit present.      Mental Status: She is alert and oriented to person, place, and time.   Psychiatric:         Mood and Affect: Mood normal.         Behavior: Behavior normal.         Thought Content: Thought content normal.            Significant Labs:  All pertinent labs from the last 24 hours have been reviewed.    Significant Diagnostics:  CXR: I have reviewed all pertinent results/findings within the past 24 hours    VTE Risk Mitigation (From admission, onward)           Ordered     enoxaparin injection 40 mg  Daily         07/10/25 1736     IP VTE HIGH RISK PATIENT  Once         07/10/25 1736     Place ESHA hose  Until discontinued         07/10/25 1736     Place sequential compression device  Until discontinued         07/10/25 1736

## 2025-07-11 NOTE — PROGRESS NOTES
[facility]  Thoracic Surgery  Progress Note    Subjective:     History of Present Illness:  No notes on file    Post-Op Info:  Procedure(s) (LRB):  XI ROBOTIC RATS, WITH LYMPHADENECTOMY (Left)  XI ROBOTIC RATS,WITH LOBECTOMY,LUNG - Left lower lobe (Left)  BLOCK, NERVE, INTERCOSTAL, 2 OR MORE (Left)   1 Day Post-Op     Interval History: Naeon. AFVSS. On 1L NC this am with sats >95%.     Medications:  Continuous Infusions:  Scheduled Meds:   acetaminophen  650 mg Oral Q8H    enoxparin  40 mg Subcutaneous Daily    gabapentin  300 mg Oral QHS    levalbuterol  1.25 mg Nebulization Q12H    methocarbamoL  500 mg Oral QID    metoprolol tartrate  12.5 mg Oral BID    polyethylene glycol  17 g Oral Daily    senna-docusate  1 tablet Oral BID     PRN Meds:  Current Facility-Administered Medications:     bisacodyL, 10 mg, Rectal, Daily PRN    ondansetron, 8 mg, Oral, Q8H PRN    oxyCODONE, 5 mg, Oral, Q4H PRN    oxyCODONE, 10 mg, Oral, Q4H PRN     Review of patient's allergies indicates:   Allergen Reactions    Codeine Nausea And Vomiting     Objective:     Vital Signs (Most Recent):  Temp: 97.8 °F (36.6 °C) (07/11/25 0343)  Pulse: 66 (07/11/25 0659)  Resp: 15 (07/11/25 0343)  BP: 132/67 (07/11/25 0343)  SpO2: 96 % (07/11/25 0343) Vital Signs (24h Range):  Temp:  [97.5 °F (36.4 °C)-98.1 °F (36.7 °C)] 97.8 °F (36.6 °C)  Pulse:  [61-78] 66  Resp:  [12-19] 15  SpO2:  [92 %-100 %] 96 %  BP: (109-135)/(59-70) 132/67  Arterial Line BP: (101-126)/(43-58) 126/57     Intake/Output - Last 3 Shifts         07/09 0700  07/10 0659 07/10 0700  07/11 0659 07/11 0700  07/12 0659    P.O.  400     IV Piggyback  1600     Total Intake(mL/kg)  2000 (35.3)     Urine (mL/kg/hr)  550     Stool  0     Chest Tube  175     Total Output  725     Net  +1275            Unmeasured Stool Occurrence  0 x             SpO2: 96 %        Physical Exam  Constitutional:       Appearance: Normal appearance.   HENT:      Head: Normocephalic and atraumatic.   Eyes:       Extraocular Movements: Extraocular movements intact.      Conjunctiva/sclera: Conjunctivae normal.   Cardiovascular:      Rate and Rhythm: Normal rate.   Pulmonary:      Effort: Pulmonary effort is normal. No respiratory distress.   Chest:      Comments: L chest tube in place tidaling no air leak  Abdominal:      General: Abdomen is flat. There is no distension.   Skin:     General: Skin is warm and dry.   Neurological:      General: No focal deficit present.      Mental Status: She is alert and oriented to person, place, and time.   Psychiatric:         Mood and Affect: Mood normal.         Behavior: Behavior normal.         Thought Content: Thought content normal.            Significant Labs:  All pertinent labs from the last 24 hours have been reviewed.    Significant Diagnostics:  CXR: I have reviewed all pertinent results/findings within the past 24 hours    VTE Risk Mitigation (From admission, onward)           Ordered     enoxaparin injection 40 mg  Daily         07/10/25 1736     IP VTE HIGH RISK PATIENT  Once         07/10/25 1736     Place ESHA hose  Until discontinued         07/10/25 1736     Place sequential compression device  Until discontinued         07/10/25 1736                  Assessment/Plan:     Pulmonary nodules  Patient is a 69F who is s/p L RATS with BLAS segmentectomy 7/10.    - Chest tube to water seal. Will do clamp trial today  - Daily CXR.   - Daily lytes.   - DVT ppx  - MMPC  - Ambulate. OOB  - Bowel Regimen  - Dispo: Continue inpatient care. Possibly d/c this pm vs tomorrow am.         Linda Ronquillo MD  Thoracic Surgery  [facility]

## 2025-07-11 NOTE — PLAN OF CARE
Problem: Physical Therapy  Goal: Physical Therapy Goal  Description: Goals to be met by: 8/10/25     Patient will increase functional independence with mobility by performin. Gait  x 300 feet with Stand-by Assistance using LRAD.     Outcome: Progressing   2025

## 2025-07-12 LAB
ABSOLUTE EOSINOPHIL (OHS): 0.03 K/UL
ABSOLUTE MONOCYTE (OHS): 1.01 K/UL (ref 0.3–1)
ABSOLUTE NEUTROPHIL COUNT (OHS): 11.59 K/UL (ref 1.8–7.7)
ANION GAP (OHS): 7 MMOL/L (ref 8–16)
BASOPHILS # BLD AUTO: 0.03 K/UL
BASOPHILS NFR BLD AUTO: 0.2 %
BUN SERPL-MCNC: 16 MG/DL (ref 8–23)
CALCIUM SERPL-MCNC: 8.1 MG/DL (ref 8.7–10.5)
CHLORIDE SERPL-SCNC: 109 MMOL/L (ref 95–110)
CO2 SERPL-SCNC: 26 MMOL/L (ref 23–29)
CREAT SERPL-MCNC: 0.6 MG/DL (ref 0.5–1.4)
ERYTHROCYTE [DISTWIDTH] IN BLOOD BY AUTOMATED COUNT: 13.2 % (ref 11.5–14.5)
GFR SERPLBLD CREATININE-BSD FMLA CKD-EPI: >60 ML/MIN/1.73/M2
GLUCOSE SERPL-MCNC: 84 MG/DL (ref 70–110)
HCT VFR BLD AUTO: 35.3 % (ref 37–48.5)
HGB BLD-MCNC: 11.5 GM/DL (ref 12–16)
IMM GRANULOCYTES # BLD AUTO: 0.07 K/UL (ref 0–0.04)
IMM GRANULOCYTES NFR BLD AUTO: 0.4 % (ref 0–0.5)
LYMPHOCYTES # BLD AUTO: 2.87 K/UL (ref 1–4.8)
MCH RBC QN AUTO: 29.6 PG (ref 27–31)
MCHC RBC AUTO-ENTMCNC: 32.6 G/DL (ref 32–36)
MCV RBC AUTO: 91 FL (ref 82–98)
NUCLEATED RBC (/100WBC) (OHS): 0 /100 WBC
PLATELET # BLD AUTO: 188 K/UL (ref 150–450)
PMV BLD AUTO: 12 FL (ref 9.2–12.9)
POTASSIUM SERPL-SCNC: 4.1 MMOL/L (ref 3.5–5.1)
RBC # BLD AUTO: 3.88 M/UL (ref 4–5.4)
RELATIVE EOSINOPHIL (OHS): 0.2 %
RELATIVE LYMPHOCYTE (OHS): 18.4 % (ref 18–48)
RELATIVE MONOCYTE (OHS): 6.5 % (ref 4–15)
RELATIVE NEUTROPHIL (OHS): 74.3 % (ref 38–73)
SODIUM SERPL-SCNC: 142 MMOL/L (ref 136–145)
WBC # BLD AUTO: 15.6 K/UL (ref 3.9–12.7)

## 2025-07-12 PROCEDURE — 80048 BASIC METABOLIC PNL TOTAL CA: CPT | Performed by: PHYSICIAN ASSISTANT

## 2025-07-12 PROCEDURE — 85025 COMPLETE CBC W/AUTO DIFF WBC: CPT

## 2025-07-12 PROCEDURE — 25000003 PHARM REV CODE 250

## 2025-07-12 PROCEDURE — 94761 N-INVAS EAR/PLS OXIMETRY MLT: CPT

## 2025-07-12 PROCEDURE — 25000242 PHARM REV CODE 250 ALT 637 W/ HCPCS: Performed by: PHYSICIAN ASSISTANT

## 2025-07-12 PROCEDURE — 25000003 PHARM REV CODE 250: Performed by: PHYSICIAN ASSISTANT

## 2025-07-12 PROCEDURE — 63600175 PHARM REV CODE 636 W HCPCS

## 2025-07-12 PROCEDURE — 20600001 HC STEP DOWN PRIVATE ROOM

## 2025-07-12 PROCEDURE — 63600175 PHARM REV CODE 636 W HCPCS: Mod: JZ,TB | Performed by: PHYSICIAN ASSISTANT

## 2025-07-12 PROCEDURE — 99900035 HC TECH TIME PER 15 MIN (STAT)

## 2025-07-12 PROCEDURE — 36415 COLL VENOUS BLD VENIPUNCTURE: CPT

## 2025-07-12 PROCEDURE — 94640 AIRWAY INHALATION TREATMENT: CPT

## 2025-07-12 RX ORDER — FUROSEMIDE 10 MG/ML
20 INJECTION INTRAMUSCULAR; INTRAVENOUS ONCE
Status: COMPLETED | OUTPATIENT
Start: 2025-07-12 | End: 2025-07-12

## 2025-07-12 RX ADMIN — METHOCARBAMOL 500 MG: 500 TABLET ORAL at 01:07

## 2025-07-12 RX ADMIN — SENNOSIDES AND DOCUSATE SODIUM 1 TABLET: 50; 8.6 TABLET ORAL at 08:07

## 2025-07-12 RX ADMIN — LEVALBUTEROL 1.25 MG: 1.25 SOLUTION, CONCENTRATE RESPIRATORY (INHALATION) at 09:07

## 2025-07-12 RX ADMIN — ENOXAPARIN SODIUM 40 MG: 40 INJECTION SUBCUTANEOUS at 05:07

## 2025-07-12 RX ADMIN — OXYCODONE HYDROCHLORIDE 5 MG: 5 TABLET ORAL at 05:07

## 2025-07-12 RX ADMIN — KETOROLAC TROMETHAMINE 15 MG: 15 INJECTION INTRAMUSCULAR at 05:07

## 2025-07-12 RX ADMIN — ACETAMINOPHEN 650 MG: 325 TABLET ORAL at 01:07

## 2025-07-12 RX ADMIN — GABAPENTIN 300 MG: 300 CAPSULE ORAL at 08:07

## 2025-07-12 RX ADMIN — METHOCARBAMOL 500 MG: 500 TABLET ORAL at 10:07

## 2025-07-12 RX ADMIN — LIDOCAINE 1 PATCH: 50 PATCH CUTANEOUS at 01:07

## 2025-07-12 RX ADMIN — METHOCARBAMOL 500 MG: 500 TABLET ORAL at 05:07

## 2025-07-12 RX ADMIN — FUROSEMIDE 20 MG: 10 INJECTION INTRAMUSCULAR; INTRAVENOUS at 01:07

## 2025-07-12 RX ADMIN — GABAPENTIN 300 MG: 300 CAPSULE ORAL at 10:07

## 2025-07-12 RX ADMIN — ACETAMINOPHEN 650 MG: 325 TABLET ORAL at 09:07

## 2025-07-12 RX ADMIN — ACETAMINOPHEN 650 MG: 325 TABLET ORAL at 05:07

## 2025-07-12 RX ADMIN — METOPROLOL TARTRATE 12.5 MG: 25 TABLET, FILM COATED ORAL at 10:07

## 2025-07-12 RX ADMIN — METOPROLOL TARTRATE 12.5 MG: 25 TABLET, FILM COATED ORAL at 08:07

## 2025-07-12 RX ADMIN — METHOCARBAMOL 500 MG: 500 TABLET ORAL at 08:07

## 2025-07-12 RX ADMIN — OXYCODONE HYDROCHLORIDE 5 MG: 5 TABLET ORAL at 11:07

## 2025-07-12 NOTE — SUBJECTIVE & OBJECTIVE
Interval History: CT atrium replaced today given blood in water reservoir. Pt endorses pain at baseline, denies any SOB or any acute complaints/concerns.     Medications:  Continuous Infusions:  Scheduled Meds:   acetaminophen  650 mg Oral Q8H    enoxparin  40 mg Subcutaneous Daily    furosemide (LASIX) injection  20 mg Intravenous Once    gabapentin  300 mg Oral BID    levalbuterol  1.25 mg Nebulization Q12H    LIDOcaine  1 patch Transdermal Q24H    methocarbamoL  500 mg Oral QID    metoprolol tartrate  12.5 mg Oral BID    polyethylene glycol  17 g Oral Daily    senna-docusate  1 tablet Oral BID     PRN Meds:  Current Facility-Administered Medications:     bisacodyL, 10 mg, Rectal, Daily PRN    ondansetron, 8 mg, Oral, Q8H PRN    oxyCODONE, 5 mg, Oral, Q4H PRN     Review of patient's allergies indicates:   Allergen Reactions    Codeine Nausea And Vomiting     Objective:     Vital Signs (Most Recent):  Temp: 98 °F (36.7 °C) (07/12/25 0816)  Pulse: 91 (07/12/25 1053)  Resp: 18 (07/12/25 0922)  BP: (!) 121/57 (07/12/25 0816)  SpO2: 96 % (07/12/25 0922) Vital Signs (24h Range):  Temp:  [98 °F (36.7 °C)-99 °F (37.2 °C)] 98 °F (36.7 °C)  Pulse:  [65-91] 91  Resp:  [12-18] 18  SpO2:  [92 %-96 %] 96 %  BP: (114-150)/(56-68) 121/57     Weight: 56.7 kg (125 lb)  Body mass index is 17.94 kg/m².    Intake/Output - Last 3 Shifts         07/10 0700 07/11 0659 07/11 0700 07/12 0659 07/12 0700 07/13 0659    P.O. 400 400     IV Piggyback 1600      Total Intake(mL/kg) 2000 (35.3) 400 (7.1)     Urine (mL/kg/hr) 550 1150 (0.8)     Stool 0 0     Chest Tube 175 420     Total Output 725 1570     Net +1275 -1170            Unmeasured Stool Occurrence 0 x 0 x              Physical Exam  Constitutional:       Appearance: Normal appearance.   HENT:      Head: Normocephalic and atraumatic.   Eyes:      Extraocular Movements: Extraocular movements intact.   Pulmonary:      Effort: Pulmonary effort is normal. No respiratory distress.       Breath sounds: No wheezing.   Chest:      Comments: L CT to w/s with serosanguinous output. Tidaling, no air leak  Skin:     General: Skin is warm and dry.   Neurological:      General: No focal deficit present.      Mental Status: She is alert and oriented to person, place, and time.   Psychiatric:         Mood and Affect: Mood normal.         Behavior: Behavior normal.          Significant Labs:  I have reviewed all pertinent lab results within the past 24 hours.    Significant Diagnostics:  I have reviewed all pertinent imaging results/findings within the past 24 hours.

## 2025-07-12 NOTE — PLAN OF CARE
"[facility] Plan of Care Note    Dx:   Malignant neoplasm of lower lobe of left lung [C34.32]  Malignant neoplasm of left lung [C34.92]    Shift Events: No significant event all night, chest tube care done. Assisted pt to the bathroom.due meds given, Call light within reached. Continued POC.    Goals of Care: Chest tube care , Pain control and safety     Neuro: AO x4     Vital Signs: BP (!) 117/56   Pulse 80   Temp 98 °F (36.7 °C)   Resp 16   Ht 5' 10" (1.778 m)   Wt 56.7 kg (125 lb)   LMP  (LMP Unknown)   SpO2 96%   Breastfeeding No   BMI 17.94 kg/m²     Respiratory: RA    Diet: Diet Adult Regular      Is patient tolerating current diet?  YES    GTTS:  NONE    Urine Output/Bowel Movement:   No intake/output data recorded.  Last Bowel Movement: 07/09/25      Drains/Tubes/Tube Feeds (include total output/shift):   No intake/output data recorded.      Lines: PIV X2      Accuchecks: NONE    Skin:  see LDA    Fall Risk Score: 3    Activity level?  SBA    Any scheduled procedures?  NONE    Any safety concerns? FALLS    Other:     Problem: Adult Inpatient Plan of Care  Goal: Plan of Care Review  Outcome: Progressing  Goal: Patient-Specific Goal (Individualized)  Outcome: Progressing  Goal: Absence of Hospital-Acquired Illness or Injury  Outcome: Progressing  Goal: Optimal Comfort and Wellbeing  Outcome: Progressing  Goal: Readiness for Transition of Care  Outcome: Progressing     Problem: Wound  Goal: Optimal Coping  Outcome: Progressing  Goal: Optimal Functional Ability  Outcome: Progressing  Goal: Absence of Infection Signs and Symptoms  Outcome: Progressing  Goal: Improved Oral Intake  Outcome: Progressing  Goal: Optimal Pain Control and Function  Outcome: Progressing  Goal: Skin Health and Integrity  Outcome: Progressing  Goal: Optimal Wound Healing  Outcome: Progressing     Problem: Infection  Goal: Absence of Infection Signs and Symptoms  Outcome: Progressing     Problem: Fall Injury Risk  Goal: Absence of Fall " and Fall-Related Injury  Outcome: Progressing

## 2025-07-12 NOTE — PROGRESS NOTES
"[facility]  General Surgery  Progress Note    Subjective:     History of Present Illness:  69 y.o. female current smoker with COPD now with LLL NSCLC. CT chest 3/10/25 with spiculated solid nodule in the left lower lobe measuring 1.2 cm and left upper lobe GGO measuring 1.2 cm. PET with left lower lobe measuring 1.1 cm, max SUV 2.6, and a ground-glass nodule in the left upper lobe measuring 1.5 cm, max SUV 1.4. Percutaneous biopsy of LLL nodule returned invasive squamous cell carcinoma. Today, she endorses SOB. She states that she can walk about a half a mile before needing to stop. SOB after 1 flight of stairs. No previous chest surgeries. Not on blood thinners. Performs ADLs independently. Endorses episodic palpitations, last heart workup in 2015. Scheduled with pulmonology for EBUS consideration on 6/26.     Discussed in tumor board with recommendation "refer to surgery for consideration of resection of biopsy proven lesion in the LLL. Consider EBUS given persistence of ground glass in BLAS"     Aside from occasional SOB and smokers cough, the patient is asymptomatic. She denies hemoptysis,  or weight loss     PSH: None  Current smoker, 1PPD x 50 years         Post-Op Info:  Procedure(s) (LRB):  XI ROBOTIC RATS, WITH LYMPHADENECTOMY (Left)  XI ROBOTIC RATS,WITH LOBECTOMY,LUNG - Left lower lobe (Left)  BLOCK, NERVE, INTERCOSTAL, 2 OR MORE (Left)   2 Days Post-Op     Interval History: CT atrium replaced today given blood in water reservoir. Pt endorses pain at baseline, denies any SOB or any acute complaints/concerns.     Medications:  Continuous Infusions:  Scheduled Meds:   acetaminophen  650 mg Oral Q8H    enoxparin  40 mg Subcutaneous Daily    furosemide (LASIX) injection  20 mg Intravenous Once    gabapentin  300 mg Oral BID    levalbuterol  1.25 mg Nebulization Q12H    LIDOcaine  1 patch Transdermal Q24H    methocarbamoL  500 mg Oral QID    metoprolol tartrate  12.5 mg Oral BID    polyethylene glycol  17 " g Oral Daily    senna-docusate  1 tablet Oral BID     PRN Meds:  Current Facility-Administered Medications:     bisacodyL, 10 mg, Rectal, Daily PRN    ondansetron, 8 mg, Oral, Q8H PRN    oxyCODONE, 5 mg, Oral, Q4H PRN     Review of patient's allergies indicates:   Allergen Reactions    Codeine Nausea And Vomiting     Objective:     Vital Signs (Most Recent):  Temp: 98 °F (36.7 °C) (07/12/25 0816)  Pulse: 91 (07/12/25 1053)  Resp: 18 (07/12/25 0922)  BP: (!) 121/57 (07/12/25 0816)  SpO2: 96 % (07/12/25 0922) Vital Signs (24h Range):  Temp:  [98 °F (36.7 °C)-99 °F (37.2 °C)] 98 °F (36.7 °C)  Pulse:  [65-91] 91  Resp:  [12-18] 18  SpO2:  [92 %-96 %] 96 %  BP: (114-150)/(56-68) 121/57     Weight: 56.7 kg (125 lb)  Body mass index is 17.94 kg/m².    Intake/Output - Last 3 Shifts         07/10 0700 07/11 0659 07/11 0700 07/12 0659 07/12 0700  07/13 0659    P.O. 400 400     IV Piggyback 1600      Total Intake(mL/kg) 2000 (35.3) 400 (7.1)     Urine (mL/kg/hr) 550 1150 (0.8)     Stool 0 0     Chest Tube 175 420     Total Output 725 1570     Net +1275 -1170            Unmeasured Stool Occurrence 0 x 0 x              Physical Exam  Constitutional:       Appearance: Normal appearance.   HENT:      Head: Normocephalic and atraumatic.   Eyes:      Extraocular Movements: Extraocular movements intact.   Pulmonary:      Effort: Pulmonary effort is normal. No respiratory distress.      Breath sounds: No wheezing.   Chest:      Comments: L CT to w/s with serosanguinous output. Tidaling, no air leak  Skin:     General: Skin is warm and dry.   Neurological:      General: No focal deficit present.      Mental Status: She is alert and oriented to person, place, and time.   Psychiatric:         Mood and Affect: Mood normal.         Behavior: Behavior normal.          Significant Labs:  I have reviewed all pertinent lab results within the past 24 hours.    Significant Diagnostics:  I have reviewed all pertinent imaging results/findings  within the past 24 hours.  Assessment/Plan:     Pulmonary nodules  Patient is a 69F who is s/p L RATS with BLAS segmentectomy 7/10.     - Chest tube to water seal, Daily CXR while in place  - Daily lytes.   - DVT ppx  - MMPC  - Ambulate. OOB  - Bowel Regimen  - Dispo: Continue inpatient care. D/c pending inprovement in CT output volume           Rios Cohen MD  General Surgery  [facility]

## 2025-07-12 NOTE — NURSING
Pt had a short run of irregular rhythm with HR to 107, not sustaining. No symptoms. Rhythm snipped in chart. Dr. Rios Cohen notified. POC continues.

## 2025-07-12 NOTE — ASSESSMENT & PLAN NOTE
Patient is a 69F who is s/p L RATS with BLAS segmentectomy 7/10.     - Chest tube to water seal, Daily CXR while in place  - Daily lytes.   - DVT ppx  - MMPC  - Ambulate. OOB  - Bowel Regimen  - Dispo: Continue inpatient care. D/c pending inprovement in CT output volume

## 2025-07-12 NOTE — PLAN OF CARE
07/12/25 1104   Post-Acute Status   Post-Acute Authorization Other   Other Status No Post-Acute Service Needs   Discharge Delays None known at this time   Discharge Plan   Discharge Plan A Home with family;Home   Discharge Plan B Home       Patient has no Social Service needs identified at this time.         Discharge Plan A and Plan B have been determined by review of patient's clinical status, future medical and therapeutic needs, and coverage/benefits for post-acute care in coordination with multidisciplinary team members.           Ilene Guadarrama, AVASW  - [facility]

## 2025-07-12 NOTE — PLAN OF CARE
[address] [facility]  Initial Discharge Assessment       Primary Care Provider: Holly Cotto DO    Admission Diagnosis: Malignant neoplasm of lower lobe of left lung [C34.32]  Malignant neoplasm of left lung [C34.92]    Admission Date: 7/10/2025  Expected Discharge Date:     Transition of Care Barriers: (P) None    Payor: HUMANA MANAGED MEDICARE / Plan: HUMANA MEDICARE SELECT PARTNER / Product Type: Medicare Advantage /     Extended Emergency Contact Information  Primary Emergency Contact: Marco Shaikh  Address: [address] [place] [address] [facility]  Home Phone: [phone]  Mobile Phone: [phone]  Relation: Spouse    Discharge Plan A: (P) Home with family  Discharge Plan B: (P) Home      [id number] - [place] - [address] AT [facility] [address]  [address]  [facility] [address]  Phone: [phone] Fax: [phone]      Initial Assessment (most recent)       Adult Discharge Assessment - 07/11/25 1800          Discharge Assessment    Assessment Type Discharge Planning Assessment (P)      Confirmed/corrected address, phone number and insurance Yes (P)      Confirmed Demographics Correct on Facesheet (P)      Communicated LESVIA with patient/caregiver Yes (P)      People in Home spouse (P)      Name(s) of People in Home SpouseMarco (P)      Do you expect to return to your current living situation? Yes (P)      Do you have help at home or someone to help you manage your care at home? Yes (P)      Who are your caregiver(s) and their phone number(s)? SpouseMarco (c) [phone] (P)      Prior to hospitilization cognitive status: Alert/Oriented (P)      Current cognitive status: Alert/Oriented (P)      Walking or Climbing Stairs Difficulty no (P)      Dressing/Bathing Difficulty no (P)      Home Accessibility wheelchair accessible (P)      Equipment Currently Used at Home none (P)      Readmission within 30 days? No  (P)      Patient currently being followed by outpatient case management? No (P)      Do you currently have service(s) that help you manage your care at home? No (P)      Do you take prescription medications? Yes (P)      Do you have prescription coverage? Yes (P)      Do you have any problems affording any of your prescribed medications? No (P)      Is the patient taking medications as prescribed? yes (P)      Who is going to help you get home at discharge? Spouse (P)      How do you get to doctors appointments? car, drives self (P)      Are you on dialysis? No (P)      Do you take coumadin? No (P)      Discharge Plan A Home with family (P)      Discharge Plan B Home (P)      DME Needed Upon Discharge  none (P)      Discharge Plan discussed with: Patient (P)      Transition of Care Barriers None (P)         Physical Activity    On average, how many days per week do you engage in moderate to strenuous exercise (like a brisk walk)? 0 days (P)      On average, how many minutes do you engage in exercise at this level? 0 min (P)         Financial Resource Strain    How hard is it for you to pay for the very basics like food, housing, medical care, and heating? Not hard at all (P)         Housing Stability    In the last 12 months, was there a time when you were not able to pay the mortgage or rent on time? No (P)      At any time in the past 12 months, were you homeless or living in a shelter (including now)? No (P)         Transportation Needs    In the past 12 months, has lack of transportation kept you from medical appointments or from getting medications? No (P)      In the past 12 months, has lack of transportation kept you from meetings, work, or from getting things needed for daily living? No (P)         Food Insecurity    Within the past 12 months, you worried that your food would run out before you got the money to buy more. Never true (P)      Within the past 12 months, the food you bought just didn't last and  you didn't have money to get more. Never true (P)         Stress    Do you feel stress - tense, restless, nervous, or anxious, or unable to sleep at night because your mind is troubled all the time - these days? Not at all (P)         Social Isolation    How often do you feel lonely or isolated from those around you?  Never (P)         Alcohol Use    Q1: How often do you have a drink containing alcohol? Never (P)      Q2: How many drinks containing alcohol do you have on a typical day when you are drinking? Patient does not drink (P)      Q3: How often do you have six or more drinks on one occasion? Never (P)         Utilities    In the past 12 months has the electric, gas, oil, or water company threatened to shut off services in your home? No (P)         Health Literacy    How often do you need to have someone help you when you read instructions, pamphlets, or other written material from your doctor or pharmacy? Never (P)                    Late Entry for 7/11/25.     SW met with pt at bedside  to complete Initial Discharge Planning Assessment. Pt. And spouse live in their home in [place], [place]. The home is single story with no steps/stairs to enter. No HME, dialysis, home health or Coumadin. Pt. Drives, but spouse will provide transportation home upon discharge. Pt. states she is feeling so much better since her procedure.     Discharge Plan A and Plan B have been determined by review of patient's clinical status, future medical and therapeutic needs, and coverage/benefits for post-acute care in coordination with multidisciplinary team members.     Rob Sun LMSW

## 2025-07-13 VITALS
OXYGEN SATURATION: 93 % | DIASTOLIC BLOOD PRESSURE: 77 MMHG | HEART RATE: 72 BPM | HEIGHT: 70 IN | TEMPERATURE: 98 F | WEIGHT: 125 LBS | SYSTOLIC BLOOD PRESSURE: 131 MMHG | BODY MASS INDEX: 17.9 KG/M2 | RESPIRATION RATE: 22 BRPM

## 2025-07-13 LAB
ABSOLUTE EOSINOPHIL (OHS): 0.14 K/UL
ABSOLUTE MONOCYTE (OHS): 0.94 K/UL (ref 0.3–1)
ABSOLUTE NEUTROPHIL COUNT (OHS): 6.54 K/UL (ref 1.8–7.7)
ANION GAP (OHS): 10 MMOL/L (ref 8–16)
BASOPHILS # BLD AUTO: 0.04 K/UL
BASOPHILS NFR BLD AUTO: 0.4 %
BUN SERPL-MCNC: 15 MG/DL (ref 8–23)
CALCIUM SERPL-MCNC: 8.3 MG/DL (ref 8.7–10.5)
CHLORIDE SERPL-SCNC: 105 MMOL/L (ref 95–110)
CO2 SERPL-SCNC: 25 MMOL/L (ref 23–29)
CREAT SERPL-MCNC: 0.6 MG/DL (ref 0.5–1.4)
ERYTHROCYTE [DISTWIDTH] IN BLOOD BY AUTOMATED COUNT: 13.3 % (ref 11.5–14.5)
GFR SERPLBLD CREATININE-BSD FMLA CKD-EPI: >60 ML/MIN/1.73/M2
GLUCOSE SERPL-MCNC: 79 MG/DL (ref 70–110)
HCT VFR BLD AUTO: 36.8 % (ref 37–48.5)
HGB BLD-MCNC: 11.9 GM/DL (ref 12–16)
IMM GRANULOCYTES # BLD AUTO: 0.03 K/UL (ref 0–0.04)
IMM GRANULOCYTES NFR BLD AUTO: 0.3 % (ref 0–0.5)
LYMPHOCYTES # BLD AUTO: 2.68 K/UL (ref 1–4.8)
MCH RBC QN AUTO: 29.2 PG (ref 27–31)
MCHC RBC AUTO-ENTMCNC: 32.3 G/DL (ref 32–36)
MCV RBC AUTO: 90 FL (ref 82–98)
NUCLEATED RBC (/100WBC) (OHS): 0 /100 WBC
PLATELET # BLD AUTO: 202 K/UL (ref 150–450)
PMV BLD AUTO: 11.9 FL (ref 9.2–12.9)
POTASSIUM SERPL-SCNC: 3.9 MMOL/L (ref 3.5–5.1)
RBC # BLD AUTO: 4.07 M/UL (ref 4–5.4)
RELATIVE EOSINOPHIL (OHS): 1.4 %
RELATIVE LYMPHOCYTE (OHS): 25.8 % (ref 18–48)
RELATIVE MONOCYTE (OHS): 9.1 % (ref 4–15)
RELATIVE NEUTROPHIL (OHS): 63 % (ref 38–73)
SODIUM SERPL-SCNC: 140 MMOL/L (ref 136–145)
WBC # BLD AUTO: 10.37 K/UL (ref 3.9–12.7)

## 2025-07-13 PROCEDURE — 85025 COMPLETE CBC W/AUTO DIFF WBC: CPT

## 2025-07-13 PROCEDURE — 36415 COLL VENOUS BLD VENIPUNCTURE: CPT | Performed by: PHYSICIAN ASSISTANT

## 2025-07-13 PROCEDURE — 99499 UNLISTED E&M SERVICE: CPT | Mod: ,,, | Performed by: STUDENT IN AN ORGANIZED HEALTH CARE EDUCATION/TRAINING PROGRAM

## 2025-07-13 PROCEDURE — 25000003 PHARM REV CODE 250: Performed by: PHYSICIAN ASSISTANT

## 2025-07-13 PROCEDURE — 99900035 HC TECH TIME PER 15 MIN (STAT)

## 2025-07-13 PROCEDURE — 94761 N-INVAS EAR/PLS OXIMETRY MLT: CPT

## 2025-07-13 PROCEDURE — 1111F DSCHRG MED/CURRENT MED MERGE: CPT | Mod: CPTII,,, | Performed by: STUDENT IN AN ORGANIZED HEALTH CARE EDUCATION/TRAINING PROGRAM

## 2025-07-13 PROCEDURE — 25000003 PHARM REV CODE 250

## 2025-07-13 PROCEDURE — 80048 BASIC METABOLIC PNL TOTAL CA: CPT | Performed by: PHYSICIAN ASSISTANT

## 2025-07-13 RX ORDER — GABAPENTIN 300 MG/1
300 CAPSULE ORAL 2 TIMES DAILY
Qty: 20 CAPSULE | Refills: 0 | Status: SHIPPED | OUTPATIENT
Start: 2025-07-13 | End: 2025-07-18 | Stop reason: DRUGHIGH

## 2025-07-13 RX ORDER — LEVALBUTEROL 1.25 MG/.5ML
1.25 SOLUTION, CONCENTRATE RESPIRATORY (INHALATION) 2 TIMES DAILY PRN
Status: DISCONTINUED | OUTPATIENT
Start: 2025-07-13 | End: 2025-07-13 | Stop reason: HOSPADM

## 2025-07-13 RX ORDER — ACETAMINOPHEN 325 MG/1
1000 TABLET ORAL EVERY 6 HOURS PRN
Qty: 56 TABLET | Refills: 0 | Status: SHIPPED | OUTPATIENT
Start: 2025-07-13 | End: 2025-07-27

## 2025-07-13 RX ORDER — OXYCODONE HYDROCHLORIDE 5 MG/1
5 TABLET ORAL EVERY 12 HOURS PRN
Qty: 10 TABLET | Refills: 0 | Status: SHIPPED | OUTPATIENT
Start: 2025-07-13 | End: 2025-07-18 | Stop reason: DRUGHIGH

## 2025-07-13 RX ORDER — OXYCODONE HYDROCHLORIDE 5 MG/1
5 TABLET ORAL EVERY 12 HOURS PRN
Qty: 10 TABLET | Refills: 0 | Status: SHIPPED | OUTPATIENT
Start: 2025-07-13 | End: 2025-07-13

## 2025-07-13 RX ORDER — POLYETHYLENE GLYCOL 3350 17 G/17G
17 POWDER, FOR SOLUTION ORAL 2 TIMES DAILY PRN
Qty: 238 G | Refills: 0 | Status: SHIPPED | OUTPATIENT
Start: 2025-07-13 | End: 2025-07-21

## 2025-07-13 RX ADMIN — GABAPENTIN 300 MG: 300 CAPSULE ORAL at 10:07

## 2025-07-13 RX ADMIN — OXYCODONE HYDROCHLORIDE 5 MG: 5 TABLET ORAL at 06:07

## 2025-07-13 RX ADMIN — METHOCARBAMOL 500 MG: 500 TABLET ORAL at 10:07

## 2025-07-13 RX ADMIN — ACETAMINOPHEN 650 MG: 325 TABLET ORAL at 05:07

## 2025-07-13 NOTE — SUBJECTIVE & OBJECTIVE
Interval History: Pt reports increased pain overnight, for which PRN oxycodone has helped. Pt denies any other acute complaint/concern, endorses ambulating independently. CT w/270 cc output past 24 hours.    Medications:  Continuous Infusions:  Scheduled Meds:   acetaminophen  650 mg Oral Q8H    enoxparin  40 mg Subcutaneous Daily    gabapentin  300 mg Oral BID    levalbuterol  1.25 mg Nebulization Q12H    LIDOcaine  1 patch Transdermal Q24H    methocarbamoL  500 mg Oral QID    metoprolol tartrate  12.5 mg Oral BID    polyethylene glycol  17 g Oral Daily    senna-docusate  1 tablet Oral BID     PRN Meds:  Current Facility-Administered Medications:     bisacodyL, 10 mg, Rectal, Daily PRN    ondansetron, 8 mg, Oral, Q8H PRN    oxyCODONE, 5 mg, Oral, Q4H PRN     Review of patient's allergies indicates:   Allergen Reactions    Codeine Nausea And Vomiting     Objective:     Vital Signs (Most Recent):  Temp: 98.2 °F (36.8 °C) (07/13/25 0738)  Pulse: 85 (07/13/25 0738)  Resp: (!) 24 (07/13/25 0738)  BP: (!) 138/57 (07/13/25 0738)  SpO2: (!) 93 % (07/13/25 0738) Vital Signs (24h Range):  Temp:  [98.1 °F (36.7 °C)-98.5 °F (36.9 °C)] 98.2 °F (36.8 °C)  Pulse:  [] 85  Resp:  [16-24] 24  SpO2:  [93 %-96 %] 93 %  BP: (119-142)/(57-65) 138/57     Weight: 56.7 kg (125 lb)  Body mass index is 17.94 kg/m².    Intake/Output - Last 3 Shifts         07/11 0700 07/12 0659 07/12 0700 07/13 0659 07/13 0700 07/14 0659    P.O. 400 900     IV Piggyback       Total Intake(mL/kg) 400 (7.1) 900 (15.9)     Urine (mL/kg/hr) 1150 (0.8) 1500 (1.1)     Stool 0 0     Chest Tube 420 276     Total Output 1570 1776     Net -1170 -876            Unmeasured Urine Occurrence  2 x     Unmeasured Stool Occurrence 0 x 0 x              Physical Exam  Constitutional:       Appearance: Normal appearance.   HENT:      Head: Normocephalic and atraumatic.   Eyes:      Extraocular Movements: Extraocular movements intact.   Pulmonary:      Effort: Pulmonary  effort is normal. No respiratory distress.      Breath sounds: No wheezing.   Chest:      Comments: L CT to w/s with serosanguinous output. Tidaling, no air leak  Skin:     General: Skin is warm and dry.   Neurological:      General: No focal deficit present.      Mental Status: She is alert and oriented to person, place, and time.   Psychiatric:         Mood and Affect: Mood normal.         Behavior: Behavior normal.      Significant Labs:  I have reviewed all pertinent lab results within the past 24 hours.    Significant Diagnostics:  I have reviewed all pertinent imaging results/findings within the past 24 hours.

## 2025-07-13 NOTE — DISCHARGE SUMMARY
"[facility]  General Surgery  Discharge Summary      Patient Name: Renata Garcia  MRN: [id number]  Admission Date: 7/10/2025  Hospital Length of Stay: 3 days  Discharge Date and Time: 07/13/2025 9:28 AM  Attending Physician: Raul Alvarez MD   Discharging Provider: Rios Cohen MD  Primary Care Provider: Holly Cotto DO    HPI:   69 y.o. female current smoker with COPD now with LLL NSCLC. CT chest 3/10/25 with spiculated solid nodule in the left lower lobe measuring 1.2 cm and left upper lobe GGO measuring 1.2 cm. PET with left lower lobe measuring 1.1 cm, max SUV 2.6, and a ground-glass nodule in the left upper lobe measuring 1.5 cm, max SUV 1.4. Percutaneous biopsy of LLL nodule returned invasive squamous cell carcinoma. Today, she endorses SOB. She states that she can walk about a half a mile before needing to stop. SOB after 1 flight of stairs. No previous chest surgeries. Not on blood thinners. Performs ADLs independently. Endorses episodic palpitations, last heart workup in 2015. Scheduled with pulmonology for EBUS consideration on 6/26.     Discussed in tumor board with recommendation "refer to surgery for consideration of resection of biopsy proven lesion in the LLL. Consider EBUS given persistence of ground glass in BLAS"     Aside from occasional SOB and smokers cough, the patient is asymptomatic. She denies hemoptysis,  or weight loss     PSH: None  Current smoker, 1PPD x 50 years         Procedure(s) (LRB):  XI ROBOTIC RATS, WITH LYMPHADENECTOMY (Left)  XI ROBOTIC RATS,WITH LOBECTOMY,LUNG - Left lower lobe (Left)  BLOCK, NERVE, INTERCOSTAL, 2 OR MORE (Left)      Indwelling Lines/Drains at time of discharge:   Lines/Drains/Airways       Drain  Duration                  Chest Tube 07/10/25 1714 Tube - 1 Left 24 Fr. 2 days                  Hospital Course: Pt presented to [facility] on 7/10 for planned robotic LLL segmentectomy with Dr. Alvarez for known squamous cell carcinoma. " Patient tolerated procedure well and, once stabilized postoperatively in the PACU, was admitted to the [facility] for postoperative management. Pt progressed well and, by POD3, chest tube output volume had decreased to the point that it was deemed appropriate for removal. Pt was tolerating a regular diet without nausea or vomiting, ambulating independently, voiding and stooling spontaneously, and pain was well controlled with oral pain medications, and deemed fit for discharge with follow up in clinic in 2 weeks    Goals of Care Treatment Preferences:  Code Status: Full Code    Discharge Physical Exam:  Physical Exam  Constitutional:       Appearance: Normal appearance.   HENT:      Head: Normocephalic and atraumatic.   Eyes:      Extraocular Movements: Extraocular movements intact.   Pulmonary:      Effort: Pulmonary effort is normal. No respiratory distress.      Breath sounds: No wheezing.   Chest:      Comments: Port site incisions c/d/I. L chest tube site with occlusive dressing overlying  Skin:     General: Skin is warm and dry.   Neurological:      General: No focal deficit present.      Mental Status: She is alert and oriented to person, place, and time. Mental status is at baseline.           Significant Diagnostic Studies: N/A    Pending Diagnostic Studies:       Procedure Component Value Units Date/Time    X-Ray Chest AP Portable [[id number]]     Order Status: Sent Lab Status: No result     X-Ray Chest AP Single View [[id number]] Resulted: 07/13/25 0642    Order Status: Sent Lab Status: In process Updated: 07/13/25 0642          Final Active Diagnoses:    Diagnosis Date Noted POA    PRINCIPAL PROBLEM:  Malignant neoplasm of lower lobe of left lung [C34.32] 07/09/2025 Yes    Pulmonary nodules [R91.8] 04/24/2025 Yes      Problems Resolved During this Admission:      Discharged Condition: good    Disposition: Home or Self Care    Follow Up:    Patient Instructions:      Lifting restrictions   Order Comments: No  lifting anything heavier than 10lbs for 6 weeks  No strenuous exercise or pushing/pulling such as mowing the lawn and vacuuming for 6 weeks     Notify your health care provider if you experience any of the following:  temperature >100.4     Notify your health care provider if you experience any of the following:  persistent nausea and vomiting or diarrhea     Notify your health care provider if you experience any of the following:  severe uncontrolled pain     Notify your health care provider if you experience any of the following:  redness, tenderness, or signs of infection (pain, swelling, redness, odor or green/yellow discharge around incision site)     Notify your health care provider if you experience any of the following:  difficulty breathing or increased cough     Notify your health care provider if you experience any of the following:  severe persistent headache     Notify your health care provider if you experience any of the following:  worsening rash     Notify your health care provider if you experience any of the following:  persistent dizziness, light-headedness, or visual disturbances     Notify your health care provider if you experience any of the following:  increased confusion or weakness     Remove dressing in 48 hours     Medications:  Reconciled Home Medications:      Medication List        START taking these medications      acetaminophen 325 MG tablet  Commonly known as: TYLENOL  Take 3 tablets (975 mg total) by mouth every 6 (six) hours as needed for Pain.     gabapentin 300 MG capsule  Commonly known as: NEURONTIN  Take 1 capsule (300 mg total) by mouth 2 (two) times daily. for 10 days     oxyCODONE 5 MG immediate release tablet  Commonly known as: ROXICODONE  Take 1 tablet (5 mg total) by mouth every 12 (twelve) hours as needed for Pain.     polyethylene glycol 17 gram Pwpk  Commonly known as: GLYCOLAX  Take 17 g by mouth 2 (two) times daily as needed for Constipation.            Time  spent on the discharge of patient: 20 minutes    Rios Cohen MD  General Surgery  [facility]

## 2025-07-13 NOTE — PLAN OF CARE
PCP follow up with Josselyn Cotto MD added to pts AVS for July 21 2025 at 2:40 pm.    07/13/25 1000   Discharge Assessment   Assessment Type Discharge Planning Reassessment

## 2025-07-13 NOTE — NURSING
Pt is ready for discharged. AVS provided. Tele taken off. Ivs taken out. Chest tube taken out by MD. Personal belongings gathered. Meds were delivered to the room. Pt's family will take pt home.

## 2025-07-13 NOTE — HOSPITAL COURSE
Pt presented to [facility] on 7/10 for planned robotic LLL segmentectomy with Dr. Alvarez for known squamous cell carcinoma. Patient tolerated procedure well and, once stabilized postoperatively in the PACU, was admitted to the [facility] for postoperative management. Pt progressed well and, by POD3, chest tube output volume had decreased to the point that it was deemed appropriate for removal. Pt was tolerating a regular diet without nausea or vomiting, ambulating independently, voiding and stooling spontaneously, and pain was well controlled with oral pain medications, and deemed fit for discharge with follow up in clinic in 2 weeks

## 2025-07-13 NOTE — PLAN OF CARE
The pt is cleared for discharge home from case management.      07/13/25 1003   Final Note   Assessment Type Final Discharge Note   Anticipated Discharge Disposition Home   What phone number can be called within the next 1-3 days to see how you are doing after discharge? [phone]   Hospital Resources/Appts/Education Provided Appointments scheduled and added to AVS   Post-Acute Status   Patient choice form signed by patient/caregiver List with quality metrics by geographic area provided   Discharge Delays None known at this time

## 2025-07-13 NOTE — DISCHARGE INSTRUCTIONS
No lifting anything heavier than 10lbs for 6 weeks  No strenuous exercise or pushing/pulling such as mowing the lawn and vacuuming for 6 weeks  Ok to shower 24 hours after surgery. No tubs, lakes, pools or submerging the incisions for 2 weeks.   Ok to take scheduled tylenol and advil for pain.  Use stronger pain medications as needed.  When using narcotic pain medications, it is recommended to use daily miralax or stool softener to avoid constipation and straining.   Follow-up in clinic in 2 weeks.

## 2025-07-13 NOTE — NURSING
[facility] Plan of Care Note  Dx     Shift Events Pain controlled throughout shift. No n/v. Ambulated in cruz x1. Ambulated to bathroom. CT x1 to WS and incisions intact. Dressing changed. IV lasix once dose given today. Atrium changed this morning. Bm today.      Neuro: aox4    Vital Signs: flowsheet    Respiratory: RA    Diet: Reg    Is patient tolerating current diet? yes    GTTS: none    Urine Output/Bowel Movement: Adequate urine output/ BM today    Drains/Tubes/Tube Feeds (include total output/shift): CT x1 to WS.    Lines: PIV x2      Accuchecks:none    Skin: ldas    Fall Risk Score: flowsheet    Activity level? Independent to standby    Any scheduled procedures? none    Any safety concerns? Fall, pain.     Other:

## 2025-07-13 NOTE — PLAN OF CARE
"[facility] Plan of Care Note    Dx:   Malignant neoplasm of lower lobe of left lung [C34.32]  Malignant neoplasm of left lung [C34.92]    Shift Events: No significant event all night, chest tube care done. Call light within reached. Continued POC.       Goals of Care: Chest tube care , Pain control and safety     Neuro:  AO x4     Vital Signs: /60 (BP Location: Right arm, Patient Position: Lying)   Pulse 79   Temp 98.2 °F (36.8 °C) (Oral)   Resp 16   Ht 5' 10" (1.778 m)   Wt 56.7 kg (125 lb)   LMP  (LMP Unknown)   SpO2 96%   Breastfeeding No   BMI 17.94 kg/m²     Respiratory: RA    Diet: Diet Adult Regular      Is patient tolerating current diet? YES    GTTS:  none    Urine Output/Bowel Movement:   No intake/output data recorded.  Last Bowel Movement: 07/12/25      Drains/Tubes/Tube Feeds (include total output/shift):   No intake/output data recorded.      Lines: PIV X2      Accuchecks: NONE    Skin:  see LDA    Fall Risk Score:  3    Activity level?  SBA    Any scheduled procedures?  none    Any safety concerns? falls    Other:     Problem: Adult Inpatient Plan of Care  Goal: Plan of Care Review  Outcome: Progressing  Goal: Patient-Specific Goal (Individualized)  Outcome: Progressing  Goal: Absence of Hospital-Acquired Illness or Injury  Outcome: Progressing  Goal: Optimal Comfort and Wellbeing  Outcome: Progressing  Goal: Readiness for Transition of Care  Outcome: Progressing     Problem: Wound  Goal: Optimal Coping  Outcome: Progressing  Goal: Optimal Functional Ability  Outcome: Progressing  Goal: Absence of Infection Signs and Symptoms  Outcome: Progressing  Goal: Improved Oral Intake  Outcome: Progressing  Goal: Optimal Pain Control and Function  Outcome: Progressing  Goal: Skin Health and Integrity  Outcome: Progressing  Goal: Optimal Wound Healing  Outcome: Progressing     Problem: Infection  Goal: Absence of Infection Signs and Symptoms  Outcome: Progressing     Problem: Fall Injury Risk  Goal: " Absence of Fall and Fall-Related Injury  Outcome: Progressing

## 2025-07-14 DIAGNOSIS — C34.32 MALIGNANT NEOPLASM OF LOWER LOBE OF LEFT LUNG: Primary | ICD-10-CM

## 2025-07-14 LAB
ABO + RH BLD: NORMAL
ABO + RH BLD: NORMAL
BLD PROD TYP BPU: NORMAL
BLD PROD TYP BPU: NORMAL
BLOOD UNIT EXPIRATION DATE: NORMAL
BLOOD UNIT EXPIRATION DATE: NORMAL
BLOOD UNIT TYPE CODE: 6200
BLOOD UNIT TYPE CODE: 6200
CROSSMATCH INTERPRETATION: NORMAL
CROSSMATCH INTERPRETATION: NORMAL
DISPENSE STATUS: NORMAL
DISPENSE STATUS: NORMAL
UNIT NUMBER: NORMAL
UNIT NUMBER: NORMAL

## 2025-07-14 NOTE — ANESTHESIA POSTPROCEDURE EVALUATION
Anesthesia Post Evaluation    Patient: Renaat Garcia    Procedure(s) Performed: Procedure(s) (LRB):  XI ROBOTIC RATS, WITH LYMPHADENECTOMY (Left)  XI ROBOTIC RATS,WITH LOBECTOMY,LUNG - Left lower lobe (Left)  BLOCK, NERVE, INTERCOSTAL, 2 OR MORE (Left)    Final Anesthesia Type: general      Patient location during evaluation: PACU  Patient participation: Yes- Able to Participate  Level of consciousness: awake and alert  Post-procedure vital signs: reviewed and not stable  Pain management: adequate  Airway patency: patent    PONV status at discharge: No PONV  Anesthetic complications: no      Cardiovascular status: blood pressure returned to baseline  Respiratory status: unassisted  Hydration status: euvolemic  Follow-up not needed.          Vitals Value Taken Time   /77 07/13/25 12:25   Temp 36.8 °C (98.2 °F) 07/13/25 07:38   Pulse 72 07/13/25 12:25   Resp 22 07/13/25 08:52   SpO2 93 % 07/13/25 08:52         Event Time   Out of Recovery 19:15:00         Pain/Fely Score: Pain Rating Prior to Med Admin: 8 (7/13/2025  6:45 AM)  Pain Rating Post Med Admin: 3 (7/13/2025  5:48 AM)

## 2025-07-15 ENCOUNTER — PATIENT OUTREACH (OUTPATIENT)
Dept: ADMINISTRATIVE | Facility: CLINIC | Age: 70
End: 2025-07-15
Payer: MEDICARE

## 2025-07-15 NOTE — PROGRESS NOTES
C3 nurse spoke with Renata Garcia for a TCC post hospital discharge follow up call. The patient has a scheduled HOSFU appointment with Holly Cotto DO on 07/21/25 @ 0573.

## 2025-07-16 ENCOUNTER — TELEPHONE (OUTPATIENT)
Dept: CARDIOTHORACIC SURGERY | Facility: CLINIC | Age: 70
End: 2025-07-16
Payer: MEDICARE

## 2025-07-16 LAB
ESTROGEN SERPL-MCNC: ABNORMAL PG/ML
INSULIN SERPL-ACNC: ABNORMAL U[IU]/ML
LAB AP CLINICAL INFORMATION: ABNORMAL
LAB AP DIAGNOSIS CATEGORY: ABNORMAL
LAB AP GROSS DESCRIPTION: ABNORMAL
LAB AP PERFORMING LOCATION(S): ABNORMAL
LAB AP REPORT FOOTNOTES: ABNORMAL
LAB AP SYNOPTIC CHECKLIST: ABNORMAL
T3RU NFR SERPL: ABNORMAL %

## 2025-07-16 NOTE — TELEPHONE ENCOUNTER
RN Navigator spoke with patient for post-op follow-up call following surgery     At Home Instructions:     Incision Care:     Please remove dressings 48 hours after your tube is removed. The only thing that should be left on the incisions are steri strips (look straw paper). Once you remove the bandages, you can shower. Soap and water can run over the incisions. Do not scrub wound when washing or drying it. Gently pat the wound dry. Do not soak in a tub for 3 weeks following surgery.     If you notice any redness, swelling, cloudy or foul-smelling drainage, tenderness or have a fever greater than 100.4, contact our office immediately. If it is not during office hours please send us a message and attach a picture for us to review. If you were discharged with a stitch in place, you can expect some redness around suture and incision. This is normal. If you are concerned, please send us a picture.      If you had a chest tube placed you may notice light pink, red or rust-colored drainage from the incision where the chest drain was removed.  If there is drainage, please allow the fluid to drain onto a heavy gauze, wash cloth, bath towel or paper towels.  It only indicates that undrained fluid is spontaneously draining.  If you hear a sucking sound or notice bubbling at the drainage site, please keep incision covered and call our office.    It is normal to cough up blood-tinged mucous following surgery. This typically occurs post-operative day 3-5. It will gradually thin out.     Walking around following lung surgery is the best thing for your lungs to heal. Slowly increase your activity each day. When you are not active, use your incentive spirometer (breathing device) a few times every hour while awake.    Please notify us if you experience persistent nausea, vomiting, diarrhea, lightheadedness, dizziness, palpitations, increased confusion, weakness, or severe uncontrolled pain.     Please notify us if you experience  any difficulty breathing or increased cough. A cough is often normal following surgery.        Pain Management:    You will be given a prescription for pain medication prior to discharge to be taken in combination with over-the-counter medications such as Tylenol and ibuprofen.     You will typically be instructed to take a few different types of medications to treat the multiple causes of pain without high doses of narcotics. Your instructions will be based on what you were requiring in the hospital. This will include a potential combination of Tylenol, Ibuprofen, Gabapentin, muscle relaxer, and narcotic.     It is common to have nerve pain following surgery between your ribs. The pain can manifest in many ways. The most common sensations are numbness, tingling, burning, hot poker, tight band like, and hypersensitivity. These can occur anywhere from the middle of your back wrapping around to your side to the breastbone on the side of the surgery. The discomfort is caused by irritation of the nerve endings near your incisions. The medicine we most commonly use to treat this type of pain is gabapentin.     Pain medication can make you nauseated and/or constipated. Make sure you eat prior to taking any pain medication and drink plenty of water. You may also use stool softeners over the counter, such as Colace (docusate calcium) 100 mg as directed to prevent constipation. If the stool softeners do not help, you can use a laxative of your choice. Discontinue if you have loose stools.       Post Operative Clinic Visit    In most cases, a post operative chest x-ray and clinic appointment will be scheduled prior to your hospital discharge. Please call our office or send a message if you need to reschedule your appointments or you feel you need to be seen sooner.        Pathology Results    Only the providers will discuss the results of your pathology report with you. This discussion is usually done at your post operative  visit. The nursing staff is not allowed to give any pathology results over the phone.  Pathology results are often released to the portal before your provider gets the results and before your clinic visit. We will discuss these with you in person.          If you have any questions or concerns, please do not hesitate to call our office during the above hours. Additionally, you can message us via the portal at any time. Messaging is the most direct way to get to us. Most of your concerns can be addressed by our team member over the phone or we can arrange for an office visit if needed. Please avoid waiting until the evening or weekend to obtain assistance for an issue that may have arisen at an earlier time during the day or week.  We want to help you to avoid the inconvenience of an urgent care or emergency department visit.      On the weekends or after office hours please call the  at [phone] and ask to speak to the Cardiothoracic Surgeon Resident on call.    In the event of a medical emergency call [phone].

## 2025-07-18 ENCOUNTER — PATIENT MESSAGE (OUTPATIENT)
Dept: CARDIOTHORACIC SURGERY | Facility: CLINIC | Age: 70
End: 2025-07-18
Payer: MEDICARE

## 2025-07-18 DIAGNOSIS — C34.32 MALIGNANT NEOPLASM OF LOWER LOBE OF LEFT LUNG: Primary | ICD-10-CM

## 2025-07-18 RX ORDER — OXYCODONE HYDROCHLORIDE 5 MG/1
5 TABLET ORAL EVERY 4 HOURS PRN
Qty: 20 TABLET | Refills: 0 | Status: SHIPPED | OUTPATIENT
Start: 2025-07-18

## 2025-07-18 RX ORDER — GABAPENTIN 300 MG/1
300 CAPSULE ORAL NIGHTLY
Qty: 30 CAPSULE | Refills: 3 | Status: SHIPPED | OUTPATIENT
Start: 2025-07-18 | End: 2025-11-15

## 2025-07-18 RX ORDER — METHOCARBAMOL 500 MG/1
500 TABLET, FILM COATED ORAL 4 TIMES DAILY
Qty: 40 TABLET | Refills: 0 | Status: SHIPPED | OUTPATIENT
Start: 2025-07-18 | End: 2025-07-28

## 2025-07-21 ENCOUNTER — OFFICE VISIT (OUTPATIENT)
Dept: FAMILY MEDICINE | Facility: CLINIC | Age: 70
End: 2025-07-21
Payer: MEDICARE

## 2025-07-21 VITALS
HEIGHT: 70 IN | SYSTOLIC BLOOD PRESSURE: 110 MMHG | OXYGEN SATURATION: 95 % | DIASTOLIC BLOOD PRESSURE: 60 MMHG | HEART RATE: 83 BPM | BODY MASS INDEX: 18 KG/M2 | TEMPERATURE: 98 F | WEIGHT: 125.75 LBS | RESPIRATION RATE: 18 BRPM

## 2025-07-21 DIAGNOSIS — Z09 HOSPITAL DISCHARGE FOLLOW-UP: Primary | ICD-10-CM

## 2025-07-21 DIAGNOSIS — J44.9 CHRONIC OBSTRUCTIVE PULMONARY DISEASE, UNSPECIFIED COPD TYPE: ICD-10-CM

## 2025-07-21 DIAGNOSIS — Z90.2 S/P LOBECTOMY OF LUNG: ICD-10-CM

## 2025-07-21 DIAGNOSIS — Z00.00 ANNUAL PHYSICAL EXAM: ICD-10-CM

## 2025-07-21 DIAGNOSIS — Z12.31 SCREENING MAMMOGRAM FOR BREAST CANCER: ICD-10-CM

## 2025-07-21 DIAGNOSIS — Z87.891 QUIT SMOKING: ICD-10-CM

## 2025-07-21 DIAGNOSIS — C34.92 SQUAMOUS CELL CARCINOMA OF LEFT LUNG: ICD-10-CM

## 2025-07-21 PROCEDURE — 1160F RVW MEDS BY RX/DR IN RCRD: CPT | Mod: CPTII,S$GLB,, | Performed by: FAMILY MEDICINE

## 2025-07-21 PROCEDURE — 99999 PR PBB SHADOW E&M-EST. PATIENT-LVL IV: CPT | Mod: PBBFAC,,, | Performed by: FAMILY MEDICINE

## 2025-07-21 PROCEDURE — 3074F SYST BP LT 130 MM HG: CPT | Mod: CPTII,S$GLB,, | Performed by: FAMILY MEDICINE

## 2025-07-21 PROCEDURE — 3008F BODY MASS INDEX DOCD: CPT | Mod: CPTII,S$GLB,, | Performed by: FAMILY MEDICINE

## 2025-07-21 PROCEDURE — 3288F FALL RISK ASSESSMENT DOCD: CPT | Mod: CPTII,S$GLB,, | Performed by: FAMILY MEDICINE

## 2025-07-21 PROCEDURE — 99214 OFFICE O/P EST MOD 30 MIN: CPT | Mod: S$GLB,,, | Performed by: FAMILY MEDICINE

## 2025-07-21 PROCEDURE — 1125F AMNT PAIN NOTED PAIN PRSNT: CPT | Mod: CPTII,S$GLB,, | Performed by: FAMILY MEDICINE

## 2025-07-21 PROCEDURE — 1159F MED LIST DOCD IN RCRD: CPT | Mod: CPTII,S$GLB,, | Performed by: FAMILY MEDICINE

## 2025-07-21 PROCEDURE — 3078F DIAST BP <80 MM HG: CPT | Mod: CPTII,S$GLB,, | Performed by: FAMILY MEDICINE

## 2025-07-21 PROCEDURE — 1101F PT FALLS ASSESS-DOCD LE1/YR: CPT | Mod: CPTII,S$GLB,, | Performed by: FAMILY MEDICINE

## 2025-07-21 PROCEDURE — 1111F DSCHRG MED/CURRENT MED MERGE: CPT | Mod: CPTII,S$GLB,, | Performed by: FAMILY MEDICINE

## 2025-07-21 NOTE — PHYSICIAN QUERY
Provider, do you agree with the path findings of: One lymph node,positive for metastatic carcinoma?    Pathology findings noted above are ruled in/confirmed as diagnoses   
No

## 2025-07-21 NOTE — PROGRESS NOTES
"Assessment & Plan:    Hospital discharge follow-up  Squamous cell carcinoma of left lung  S/P lobectomy of lung  Chronic obstructive pulmonary disease, unspecified COPD type  Hospital course reviewed with patient, including pathology results. Patient seems to be recovering well. She has an appointment with CT Surgery on 7/24 and Pulmonology in October. Continue pain regimen.    Annual physical exam  -     CBC Auto Differential; Future; Expected date: 07/21/2025  -     Comprehensive Metabolic Panel; Future; Expected date: 07/21/2025  -     Hemoglobin A1C; Future; Expected date: 07/21/2025  -     Lipid Panel; Future; Expected date: 07/21/2025    Fasting labs to be scheduled.    Screening mammogram for breast cancer  -     Mammo Digital Screening Bilat w/ Juan J (XPD); Future; Expected date: 07/21/2025    Quit smoking  Congratulated patient on this healthy lifestyle change.       Follow-up: Follow up in about 1 year (around 7/21/2026).  ______________________________________________________________________    Chief Complaint  Chief Complaint   Patient presents with    Hospital Follow Up       HPI  Renata Garcia is a 69 y.o. female with medical diagnoses as listed in the medical history and problem list that presents to the office to follow up on recent hospitalization for a left lower lobectomy on 7/10. The HPI and hospital course were summarized as below:    "HPI:   69 y.o. female current smoker with COPD now with LLL NSCLC. CT chest 3/10/25 with spiculated solid nodule in the left lower lobe measuring 1.2 cm and left upper lobe GGO measuring 1.2 cm. PET with left lower lobe measuring 1.1 cm, max SUV 2.6, and a ground-glass nodule in the left upper lobe measuring 1.5 cm, max SUV 1.4. Percutaneous biopsy of LLL nodule returned invasive squamous cell carcinoma. Today, she endorses SOB. She states that she can walk about a half a mile before needing to stop. SOB after 1 flight of stairs. No previous chest surgeries. Not " "on blood thinners. Performs ADLs independently. Endorses episodic palpitations, last heart workup in 2015. Scheduled with pulmonology for EBUS consideration on 6/26.     Discussed in tumor board with recommendation "refer to surgery for consideration of resection of biopsy proven lesion in the LLL. Consider EBUS given persistence of ground glass in BLAS"     Aside from occasional SOB and smokers cough, the patient is asymptomatic. She denies hemoptysis,  or weight loss     PSH: None  Current smoker, 1PPD x 50 years     Procedure(s) (LRB):  XI ROBOTIC RATS, WITH LYMPHADENECTOMY (Left)  XI ROBOTIC RATS,WITH LOBECTOMY,LUNG - Left lower lobe (Left)  BLOCK, NERVE, INTERCOSTAL, 2 OR MORE (Left)      Indwelling Lines/Drains at time of discharge:   Lines/Drains/Airways         Drain  Duration                     Chest Tube 07/10/25 1714 Tube - 1 Left 24 Fr. 2 days                       Hospital Course: Pt presented to [facility] on 7/10 for planned robotic LLL segmentectomy with Dr. Alvarez for known squamous cell carcinoma. Patient tolerated procedure well and, once stabilized postoperatively in the PACU, was admitted to the [facility] for postoperative management. Pt progressed well and, by POD3, chest tube output volume had decreased to the point that it was deemed appropriate for removal. Pt was tolerating a regular diet without nausea or vomiting, ambulating independently, voiding and stooling spontaneously, and pain was well controlled with oral pain medications, and deemed fit for discharge with follow up in clinic in 2 weeks."    Today, patient states that she is recovering well. She has a mild productive cough since the procedure but it is improving. She has left lower rib pain from coughing and deep inspiration, well controlled with gabapentin, Robaxin, and oxycodone. Reserves oxycodone for severe pain only. The diagnosis of cancer has been weighing heavily on her but she is coping well with it. She quit smoking " just before the lobectomy and has had no desire to smoke.     PAST MEDICAL HISTORY:  History reviewed. No pertinent past medical history.    PAST SURGICAL HISTORY:  Past Surgical History:   Procedure Laterality Date    COLONOSCOPY N/A 09/18/2024    Procedure: COLONOSCOPY;  Surgeon: Benoit Chin MD;  Location: [facility];  Service: Endoscopy;  Laterality: N/A;  8/21 ref  by LEANDRO HERNANDEZ, PEG, instr. to portal-st  9/10/24- lvm/portal for pc. DBM  9/16 pt r/s, PEG, portal.efren    INJECTION OF ANESTHETIC AGENT AROUND MULTIPLE INTERCOSTAL NERVES Left 7/10/2025    Procedure: BLOCK, NERVE, INTERCOSTAL, 2 OR MORE;  Surgeon: Raul Alvarez MD;  Location: [facility];  Service: Thoracic;  Laterality: Left;    LASER ABLATION OF THE CERVIX      ROBOTIC BRONCHOSCOPY N/A 6/27/2025    Procedure: ROBOTIC BRONCHOSCOPY;  Surgeon: Raquel Yoon MD;  [facility];  Service: Pulmonary;  Laterality: N/A;    XI ROBOTIC RATS, WITH LYMPHADENECTOMY Left 7/10/2025    Procedure: XI ROBOTIC RATS, WITH LYMPHADENECTOMY;  Surgeon: Raul Alvarez MD;  [facility]: [facility];  Service: Thoracic;  Laterality: Left;    XI ROBOTIC RATS,WITH LOBECTOMY,LUNG Left 7/10/2025    Procedure: XI ROBOTIC RATS,WITH LOBECTOMY,LUNG - Left lower lobe;  Surgeon: Raul Alvarez MD;  Location: [facility];  Service: Thoracic;  Laterality: Left;       SOCIAL HISTORY:  Social History[1]    FAMILY HISTORY:  Family History   Problem Relation Name Age of Onset    Cancer Sister      Hepatitis Sister      Cancer Maternal Uncle         ALLERGIES AND MEDICATIONS: updated and reviewed.  Review of patient's allergies indicates:   Allergen Reactions    Codeine Nausea And Vomiting     Current Medications[2]      ROS  Review of Systems   Constitutional:  Negative for appetite change and unexpected weight change.   Respiratory:  Positive for cough and shortness of breath.    Cardiovascular:  Positive for chest pain (rib pain).   Skin:   "Positive for wound.           Physical Exam  Vitals:    25 1451   BP: 110/60   Patient Position: Sitting   Pulse: 83   Resp: 18   Temp: 97.7 °F (36.5 °C)   TempSrc: Oral   SpO2: 95%   Weight: 57.1 kg (125 lb 12.4 oz)   Height: 5' 10" (1.778 m)    Body mass index is 18.05 kg/m².  Weight: 57.1 kg (125 lb 12.4 oz)   Height: 5' 10" (177.8 cm)   Physical Exam  Constitutional:       General: She is not in acute distress.  HENT:      Head: Normocephalic and atraumatic.   Cardiovascular:      Rate and Rhythm: Normal rate and regular rhythm.      Pulses: Normal pulses.      Heart sounds: Normal heart sounds.   Pulmonary:      Effort: Pulmonary effort is normal. No respiratory distress.      Breath sounds: Normal breath sounds.      Comments: Mild cough  Musculoskeletal:      Right lower leg: No edema.      Left lower leg: No edema.   Skin:     General: Skin is warm and dry.      Comments: Abdominal wound from chest tube is clean and dry, intact with suture. Other abdominal wounds are covered with adhesive dressing, clean and dry.   Neurological:      General: No focal deficit present.      Mental Status: She is alert and oriented to person, place, and time.   Psychiatric:      Comments: Pleasant but tearful                  [1]   Social History  Socioeconomic History    Marital status:    Tobacco Use    Smoking status: Former     Current packs/day: 0.00     Average packs/day: 0.5 packs/day for 56.0 years (28.0 ttl pk-yrs)     Types: Cigarettes     Start date:      Quit date:      Years since quittin.5     Passive exposure: Current    Smokeless tobacco: Current   Substance and Sexual Activity    Alcohol use: Yes     Comment: occ    Drug use: Never    Sexual activity: Not Currently     Social Drivers of Health     Financial Resource Strain: Low Risk  (2025)    Overall Financial Resource Strain (CARDIA)     Difficulty of Paying Living Expenses: Not hard at all   Food Insecurity: No Food Insecurity " (7/11/2025)    Hunger Vital Sign     Worried About Running Out of Food in the Last Year: Never true     Ran Out of Food in the Last Year: Never true   Transportation Needs: No Transportation Needs (7/11/2025)    PRAPARE - Transportation     Lack of Transportation (Medical): No     Lack of Transportation (Non-Medical): No   Physical Activity: Inactive (7/11/2025)    Exercise Vital Sign     Days of Exercise per Week: 0 days     Minutes of Exercise per Session: 0 min   Stress: No Stress Concern Present (7/11/2025)    [employer] - Occupational Stress Questionnaire     Feeling of Stress : Not at all   Recent Concern: Stress - Stress Concern Present (4/23/2025)    [employer] - Occupational Stress Questionnaire     Feeling of Stress : To some extent   Housing Stability: Low Risk  (7/11/2025)    Housing Stability Vital Sign     Unable to Pay for Housing in the Last Year: No     Homeless in the Last Year: No   [2]   Current Outpatient Medications   Medication Sig Dispense Refill    acetaminophen (TYLENOL) 325 MG tablet Take 3 tablets (975 mg total) by mouth every 6 (six) hours as needed for Pain. 56 tablet 0    gabapentin (NEURONTIN) 300 MG capsule Take 1 capsule (300 mg total) by mouth every evening. 30 capsule 3    methocarbamoL (ROBAXIN) 500 MG Tab Take 1 tablet (500 mg total) by mouth 4 (four) times daily. for 10 days 40 tablet 0    oxyCODONE (ROXICODONE) 5 MG immediate release tablet Take 1 tablet (5 mg total) by mouth every 4 (four) hours as needed for Pain. 20 tablet 0     No current facility-administered medications for this visit.

## 2025-07-22 NOTE — PROGRESS NOTES
"Subjective     Patient ID: Renata Garcia is a 69 y.o. female.    Chief Complaint: Post-op Evaluation    Diagnosis:  NSCLC - LLL squamous cell carcinoma, BLAS adenocarcinoma     Pre-operative therapy: biopsies and EBUS with level 7 negative    Procedure(s) and date(s): 7/10/25: left robotic assisted superior segmentectomy with MLND.    Pathology:  1.5 cm invasive squamous cell carcinoma.  level 10 and 11 positive for squamous cell carcinoma. Level 5,7,8,9,12 - negative.      Post-operative therapy: refer to oncology     HPI  69 y.o. female current smoker with COPD now with LLL squamous cell carcinoma and BLAS adenocarcinoma here today for post op from LLL segmentectomy with MLND. Uncomplicated post op course. Since discharge, She reports feeling well overall. Denies SOB. Endorses neuropathic pain, currently taking gabapentin 300mg once daily. Denies fevers, chest pain, N/V/D. Follows with Dr. Santiago. [facility]    Discussed in tumor board yesterday, " Plan for chemo followed by immunotherapy for the LLL squamous cell carcinoma. Refer to radiation for SBRT to BLAS lepidic adenocarcinoma"    Review of Systems   Constitutional:  Negative for chills and fever.   Respiratory:  Negative for cough and shortness of breath.    Cardiovascular:  Negative for chest pain and leg swelling.   Gastrointestinal:  Negative for constipation, diarrhea, vomiting and reflux.   Genitourinary:  Negative for difficulty urinating.          Objective     Vitals:    07/24/25 0935   BP: 123/77   Pulse: 76   SpO2: 97%   Weight: 57 kg (125 lb 10.6 oz)   Height: 5' 10" (1.778 m)   PainSc:   5       CXR 7/24/25:   Resolved left pneumothorax.  Left chest tube removed.  Status post left thoracotomy for partial lung resection, as above.        Assessment and Plan   69 y.o. female current smoker with COPD now with LLL squamous cell carcinoma and BLAS adenocarcinoma here today for post op from LLL segmentectomy with " MLND. Pathology [id number] for LLL squamous.  BLAS adenocarcinoma - pending treatment.   Progressing well    PLAN  Increase gabapentin to 300mg BID  Plan for RT to BLAS adenocarcinoma  Refer to [facility] to discuss chemotherapy followed by immunotherapy for LLL squamous cell carcinoma  RTC PRN

## 2025-07-24 ENCOUNTER — HOSPITAL ENCOUNTER (OUTPATIENT)
Dept: RADIOLOGY | Facility: HOSPITAL | Age: 70
Discharge: HOME | End: 2025-07-24
Attending: STUDENT IN AN ORGANIZED HEALTH CARE EDUCATION/TRAINING PROGRAM
Payer: MEDICARE

## 2025-07-24 ENCOUNTER — OFFICE VISIT (OUTPATIENT)
Dept: CARDIOTHORACIC SURGERY | Facility: CLINIC | Age: 70
End: 2025-07-24
Attending: STUDENT IN AN ORGANIZED HEALTH CARE EDUCATION/TRAINING PROGRAM
Payer: MEDICARE

## 2025-07-24 VITALS
BODY MASS INDEX: 17.99 KG/M2 | WEIGHT: 125.69 LBS | HEIGHT: 70 IN | HEART RATE: 76 BPM | SYSTOLIC BLOOD PRESSURE: 123 MMHG | DIASTOLIC BLOOD PRESSURE: 77 MMHG | OXYGEN SATURATION: 97 %

## 2025-07-24 DIAGNOSIS — C34.32 MALIGNANT NEOPLASM OF LOWER LOBE OF LEFT LUNG: ICD-10-CM

## 2025-07-24 PROCEDURE — 71046 X-RAY EXAM CHEST 2 VIEWS: CPT | Mod: TC

## 2025-07-24 PROCEDURE — 99999 PR PBB SHADOW E&M-EST. PATIENT-LVL III: CPT | Mod: PBBFAC,HCNC,, | Performed by: STUDENT IN AN ORGANIZED HEALTH CARE EDUCATION/TRAINING PROGRAM

## 2025-07-24 PROCEDURE — 71046 X-RAY EXAM CHEST 2 VIEWS: CPT | Mod: 26,,, | Performed by: RADIOLOGY

## 2025-07-24 RX ORDER — GABAPENTIN 300 MG/1
300 CAPSULE ORAL 2 TIMES DAILY
Qty: 60 CAPSULE | Refills: 3 | Status: SHIPPED | OUTPATIENT
Start: 2025-07-24 | End: 2025-11-21

## 2025-07-25 ENCOUNTER — OFFICE VISIT (OUTPATIENT)
Dept: RADIATION ONCOLOGY | Facility: CLINIC | Age: 70
End: 2025-07-25
Payer: MEDICARE

## 2025-07-25 VITALS
OXYGEN SATURATION: 95 % | RESPIRATION RATE: 20 BRPM | SYSTOLIC BLOOD PRESSURE: 139 MMHG | BODY MASS INDEX: 17.96 KG/M2 | DIASTOLIC BLOOD PRESSURE: 78 MMHG | HEIGHT: 70 IN | WEIGHT: 125.44 LBS | TEMPERATURE: 98 F | HEART RATE: 73 BPM

## 2025-07-25 DIAGNOSIS — C34.12 MALIGNANT NEOPLASM OF UPPER LOBE OF LEFT LUNG: Primary | ICD-10-CM

## 2025-07-25 PROCEDURE — 3078F DIAST BP <80 MM HG: CPT | Mod: CPTII,HCNC,S$GLB, | Performed by: STUDENT IN AN ORGANIZED HEALTH CARE EDUCATION/TRAINING PROGRAM

## 2025-07-25 PROCEDURE — 1126F AMNT PAIN NOTED NONE PRSNT: CPT | Mod: CPTII,HCNC,S$GLB, | Performed by: STUDENT IN AN ORGANIZED HEALTH CARE EDUCATION/TRAINING PROGRAM

## 2025-07-25 PROCEDURE — 3008F BODY MASS INDEX DOCD: CPT | Mod: CPTII,HCNC,S$GLB, | Performed by: STUDENT IN AN ORGANIZED HEALTH CARE EDUCATION/TRAINING PROGRAM

## 2025-07-25 PROCEDURE — 3075F SYST BP GE 130 - 139MM HG: CPT | Mod: CPTII,HCNC,S$GLB, | Performed by: STUDENT IN AN ORGANIZED HEALTH CARE EDUCATION/TRAINING PROGRAM

## 2025-07-25 PROCEDURE — 99204 OFFICE O/P NEW MOD 45 MIN: CPT | Mod: HCNC,S$GLB,, | Performed by: STUDENT IN AN ORGANIZED HEALTH CARE EDUCATION/TRAINING PROGRAM

## 2025-07-25 PROCEDURE — 1101F PT FALLS ASSESS-DOCD LE1/YR: CPT | Mod: CPTII,HCNC,S$GLB, | Performed by: STUDENT IN AN ORGANIZED HEALTH CARE EDUCATION/TRAINING PROGRAM

## 2025-07-25 PROCEDURE — 99999 PR PBB SHADOW E&M-EST. PATIENT-LVL III: CPT | Mod: PBBFAC,HCNC,, | Performed by: STUDENT IN AN ORGANIZED HEALTH CARE EDUCATION/TRAINING PROGRAM

## 2025-07-25 PROCEDURE — 3288F FALL RISK ASSESSMENT DOCD: CPT | Mod: CPTII,HCNC,S$GLB, | Performed by: STUDENT IN AN ORGANIZED HEALTH CARE EDUCATION/TRAINING PROGRAM

## 2025-07-25 PROCEDURE — 1159F MED LIST DOCD IN RCRD: CPT | Mod: CPTII,HCNC,S$GLB, | Performed by: STUDENT IN AN ORGANIZED HEALTH CARE EDUCATION/TRAINING PROGRAM

## 2025-07-25 PROCEDURE — 1111F DSCHRG MED/CURRENT MED MERGE: CPT | Mod: CPTII,HCNC,S$GLB, | Performed by: STUDENT IN AN ORGANIZED HEALTH CARE EDUCATION/TRAINING PROGRAM

## 2025-07-25 NOTE — PROGRESS NOTES
[facility] Consult Note    Referring provider: Raul Alvarez MD    Assessment:  Renata Garcia is a 69 y.o. female with a T1b vs [id number], group stage 0 vs I, adenocarcinoma of the BLAS.   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 Discussed relative stability if the BLAS GGO compared to 2024, but does have adenocarcinoma histology, with possible pure lepidic features and < 3cm (Tis) vs well differentiated adenocarcinoma.  She will need chemo for her LLL, N+ squam. Discussed timing of systemic and RT.   We discussed the goals of treatment to be curative.  The risks, benefits, scheduling, alternatives to and rationale of radiation therapy were explained in detail.    After this discussion, she elected to proceed with SBRT to the BLAS. I favor completing RT to the BLAS then initiating systemic, if med onc in agreement.   Consent was obtained and all questions were answered to the best of my ability  A CT simulation will be performed on 8/4/25 to begin the planning process for the patient's radiation therapy.    This will allow some time for her to recover from surgery.    She was given our contact information, and she was told that she could call our clinic at any time if she has any questions or concerns.    Radiation Treatment Details:   50-60 Gy in 4-5 fx using SBRT technique      Oncologic History:  3/10/25: CT chest with:  interval enlargement of spiculated solid pulmonary nodule in the left lower lobe, measuring 1.2 cm, up from 0.8 cm.   BLAS nodule measuring 1.7 x 1 cm, previously 1.8 x 0.9 cm 7/2024 4/30/25: PET/CT with 1.1 cm LLL nodule with SUV 2.6, additional GGO in the BLAS with SUV 1.4, measuring 1.5 cm. No regional or distant mets  5/14/25: IR biopsy  Path:  LLL with Invasive squamous cell carcinoma, nonkeratinizing  6/27/25: Bronch and EBUS  Op Note: target in the superior lingula segment of the left upper lobe was identified and marked. A ground glass mass 1.7 cm in size was biopsied  Path: BLAS: adenocarcinoma  station 7 negative for malignancy.   7/10/25: Left Robotic-Assisted superior segmentectomy of LLL, MLND  Path: LLL with 1.5cm G2 squamous cell carcinoma, margins negative, no VPI, +LVSI  10L and 11L with metastatic carcinoma (2LN+). pT1bN1  7/23/25: Thoracic TB: Plan for chemo followed by immunotherapy for the LLL squamous cell carcinoma. 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         Possibility of pregnancy: No  History of prior irradiation: No  History of prior systemic anti-cancer therapy: No  History of collagen vascular disease: No  Implanted electronic device (pacer/defib/nerve stimulator): No     History of Present Illness:  Renata Garcia presents today to discuss the role of radiotherapy.    She has quit smoking. Has some post op discomfort, expected, as well as some anxiety.     Breathing not worse post op. Not needing oxygen. Independent in ADLs.    Review of Systems:  ROS    Social History:  Social History[1]    Past Medical History:  No past medical history on file.    Past Surgical History:   Procedure Laterality Date    COLONOSCOPY N/A 09/18/2024    Procedure: COLONOSCOPY;  Surgeon: Benoit Chin MD;  Location: [facility];  Service: Endoscopy;  Laterality: N/A;  8/21 ref  by LEANDRO HERNANDEZ, JANY, instr. to portal-st  9/10/24- lvm/portal for pc. DB  9/16 pt r/s, PEG, portal.efren    INJECTION OF ANESTHETIC AGENT AROUND MULTIPLE INTERCOSTAL NERVES Left 7/10/2025    Procedure: BLOCK, NERVE, INTERCOSTAL, 2 OR MORE;  Surgeon: Raul Alvarez MD;  Location: [address];  Service: Thoracic;  Laterality: Left;    LASER ABLATION OF THE CERVIX      ROBOTIC BRONCHOSCOPY N/A 6/27/2025    Procedure: ROBOTIC BRONCHOSCOPY;   "Surgeon: Raquel Yoon MD;  Location: [facility] OR [address] [place] [address];  Service: Pulmonary;  Laterality: N/A;    XI ROBOTIC RATS, WITH LYMPHADENECTOMY Left 7/10/2025    Procedure: XI ROBOTIC RATS, WITH LYMPHADENECTOMY;  Surgeon: Raul Alvarez MD;  [facility];  Service: Thoracic;  Laterality: Left;    XI ROBOTIC RATS,WITH LOBECTOMY,LUNG Left 7/10/2025    Procedure: XI ROBOTIC RATS,WITH LOBECTOMY,LUNG - Left lower lobe;  Surgeon: Raul Alvarez MD;  Location: [facility] OR [address] [place] [address];  Service: Thoracic;  Laterality: Left;         Medications:  Medications Ordered Prior to Encounter[2]    Allergies:  Review of patient's allergies indicates:   Allergen Reactions    Codeine Nausea And Vomiting       Exam:  Vitals:    07/25/25 1117   BP: 139/78   Pulse: 73   Resp: 20   Temp: 97.9 °F (36.6 °C)   SpO2: 95%   Weight: 56.9 kg (125 lb 7.1 oz)   Height: 5' 10" (1.778 m)     Constitutional: Pleasant 69 y.o. female in no acute distress.  Well nourished. Well groomed.   HEENT: Normocephalic and atraumatic   Cardiovascular: Upper extremities warm to touch  Lungs: No audible wheezing.  Normal effort.   Musculoskeletal: No gross MSK deformities. Ambulates well  Skin: No rashes appreciated.  Psych: Alert and oriented with appropriate mood and affect.  Neuro:   Grossly normal.    Data Review:  Information obtained from Renata Garcia and via chart review.     Independent Interpretation of Test(s): CT chest from 6/13/25 was personally reviewed and shows BLAS subsolid lesion, grossly unchanged compared to 2024.      I spent a total of 53 minutes on the day of the visit.  This includes face to face time and non-face to face time preparing to see the patient (eg, review of tests), obtaining and/or reviewing separately obtained history, documenting clinical information in the electronic or other health record, independently interpreting results and communicating results to the patient/family/caregiver, or care coordinator.        Hussain" MD Saw  Radiation Oncology               [1]   Social History  Tobacco Use    Smoking status: Former     Current packs/day: 0.00     Average packs/day: 0.5 packs/day for 56.0 years (28.0 ttl pk-yrs)     Types: Cigarettes     Start date:      Quit date:      Years since quittin.5     Passive exposure: Current    Smokeless tobacco: Current   Substance Use Topics    Alcohol use: Yes     Comment: occ    Drug use: Never   [2]   Current Outpatient Medications on File Prior to Visit   Medication Sig Dispense Refill    acetaminophen (TYLENOL) 325 MG tablet Take 3 tablets (975 mg total) by mouth every 6 (six) hours as needed for Pain. 56 tablet 0    gabapentin (NEURONTIN) 300 MG capsule Take 1 capsule (300 mg total) by mouth 2 (two) times daily. 60 capsule 3    methocarbamoL (ROBAXIN) 500 MG Tab Take 1 tablet (500 mg total) by mouth 4 (four) times daily. for 10 days 40 tablet 0    oxyCODONE (ROXICODONE) 5 MG immediate release tablet Take 1 tablet (5 mg total) by mouth every 4 (four) hours as needed for Pain. 20 tablet 0     No current facility-administered medications on file prior to visit.

## 2025-07-28 ENCOUNTER — LAB VISIT (OUTPATIENT)
Dept: LAB | Facility: HOSPITAL | Age: 70
End: 2025-07-28
Attending: FAMILY MEDICINE
Payer: MEDICARE

## 2025-07-28 DIAGNOSIS — Z00.00 ANNUAL PHYSICAL EXAM: ICD-10-CM

## 2025-07-28 LAB
ABSOLUTE EOSINOPHIL (OHS): 0.23 K/UL
ABSOLUTE MONOCYTE (OHS): 0.77 K/UL (ref 0.3–1)
ABSOLUTE NEUTROPHIL COUNT (OHS): 5.79 K/UL (ref 1.8–7.7)
ALBUMIN SERPL BCP-MCNC: 3.2 G/DL (ref 3.5–5.2)
ALP SERPL-CCNC: 114 UNIT/L (ref 40–150)
ALT SERPL W/O P-5'-P-CCNC: 56 UNIT/L (ref 0–55)
ANION GAP (OHS): 9 MMOL/L (ref 8–16)
AST SERPL-CCNC: 31 UNIT/L (ref 0–50)
BASOPHILS # BLD AUTO: 0.06 K/UL
BASOPHILS NFR BLD AUTO: 0.6 %
BILIRUB SERPL-MCNC: 0.3 MG/DL (ref 0.1–1)
BUN SERPL-MCNC: 11 MG/DL (ref 8–23)
CALCIUM SERPL-MCNC: 8.8 MG/DL (ref 8.7–10.5)
CHLORIDE SERPL-SCNC: 104 MMOL/L (ref 95–110)
CHOLEST SERPL-MCNC: 175 MG/DL (ref 120–199)
CHOLEST/HDLC SERPL: 4.7 {RATIO} (ref 2–5)
CO2 SERPL-SCNC: 27 MMOL/L (ref 23–29)
CREAT SERPL-MCNC: 0.7 MG/DL (ref 0.5–1.4)
EAG (OHS): 105 MG/DL (ref 68–131)
ERYTHROCYTE [DISTWIDTH] IN BLOOD BY AUTOMATED COUNT: 12.6 % (ref 11.5–14.5)
GFR SERPLBLD CREATININE-BSD FMLA CKD-EPI: >60 ML/MIN/1.73/M2
GLUCOSE SERPL-MCNC: 93 MG/DL (ref 70–110)
HBA1C MFR BLD: 5.3 % (ref 4–5.6)
HCT VFR BLD AUTO: 39 % (ref 37–48.5)
HDLC SERPL-MCNC: 37 MG/DL (ref 40–75)
HDLC SERPL: 21.1 % (ref 20–50)
HGB BLD-MCNC: 12.5 GM/DL (ref 12–16)
IMM GRANULOCYTES # BLD AUTO: 0.03 K/UL (ref 0–0.04)
IMM GRANULOCYTES NFR BLD AUTO: 0.3 % (ref 0–0.5)
LDLC SERPL CALC-MCNC: 98 MG/DL (ref 63–159)
LYMPHOCYTES # BLD AUTO: 2.45 K/UL (ref 1–4.8)
MCH RBC QN AUTO: 29.4 PG (ref 27–31)
MCHC RBC AUTO-ENTMCNC: 32.1 G/DL (ref 32–36)
MCV RBC AUTO: 92 FL (ref 82–98)
NONHDLC SERPL-MCNC: 138 MG/DL
NUCLEATED RBC (/100WBC) (OHS): 0 /100 WBC
PLATELET # BLD AUTO: 432 K/UL (ref 150–450)
PMV BLD AUTO: 10.4 FL (ref 9.2–12.9)
POTASSIUM SERPL-SCNC: 4.2 MMOL/L (ref 3.5–5.1)
PROT SERPL-MCNC: 6.6 GM/DL (ref 6–8.4)
RBC # BLD AUTO: 4.25 M/UL (ref 4–5.4)
RELATIVE EOSINOPHIL (OHS): 2.5 %
RELATIVE LYMPHOCYTE (OHS): 26.3 % (ref 18–48)
RELATIVE MONOCYTE (OHS): 8.3 % (ref 4–15)
RELATIVE NEUTROPHIL (OHS): 62 % (ref 38–73)
SODIUM SERPL-SCNC: 140 MMOL/L (ref 136–145)
TRIGL SERPL-MCNC: 200 MG/DL (ref 30–150)
WBC # BLD AUTO: 9.33 K/UL (ref 3.9–12.7)

## 2025-07-28 PROCEDURE — 85025 COMPLETE CBC W/AUTO DIFF WBC: CPT | Mod: HCNC

## 2025-07-28 PROCEDURE — 36415 COLL VENOUS BLD VENIPUNCTURE: CPT | Mod: HCNC,PO

## 2025-07-28 PROCEDURE — 83718 ASSAY OF LIPOPROTEIN: CPT | Mod: HCNC

## 2025-07-28 PROCEDURE — 83036 HEMOGLOBIN GLYCOSYLATED A1C: CPT | Mod: HCNC

## 2025-07-28 PROCEDURE — 82435 ASSAY OF BLOOD CHLORIDE: CPT | Mod: HCNC

## 2025-07-29 ENCOUNTER — OFFICE VISIT (OUTPATIENT)
Dept: FAMILY MEDICINE | Facility: CLINIC | Age: 70
End: 2025-07-29
Payer: MEDICARE

## 2025-07-29 VITALS
DIASTOLIC BLOOD PRESSURE: 68 MMHG | BODY MASS INDEX: 18.25 KG/M2 | HEIGHT: 70 IN | HEART RATE: 69 BPM | RESPIRATION RATE: 18 BRPM | WEIGHT: 127.44 LBS | OXYGEN SATURATION: 98 % | SYSTOLIC BLOOD PRESSURE: 124 MMHG | TEMPERATURE: 98 F

## 2025-07-29 DIAGNOSIS — F51.04 PSYCHOPHYSIOLOGICAL INSOMNIA: ICD-10-CM

## 2025-07-29 DIAGNOSIS — Z00.00 ANNUAL PHYSICAL EXAM: Primary | ICD-10-CM

## 2025-07-29 DIAGNOSIS — E78.1 HYPERTRIGLYCERIDEMIA: ICD-10-CM

## 2025-07-29 DIAGNOSIS — C34.92 ADENOCARCINOMA OF LEFT LUNG: ICD-10-CM

## 2025-07-29 DIAGNOSIS — I70.0 AORTIC ATHEROSCLEROSIS: ICD-10-CM

## 2025-07-29 DIAGNOSIS — Z90.2 S/P LOBECTOMY OF LUNG: ICD-10-CM

## 2025-07-29 DIAGNOSIS — C34.92 SQUAMOUS CELL CARCINOMA OF LEFT LUNG: ICD-10-CM

## 2025-07-29 DIAGNOSIS — I25.10 ATHEROSCLEROSIS OF NATIVE CORONARY ARTERY OF NATIVE HEART WITHOUT ANGINA PECTORIS: ICD-10-CM

## 2025-07-29 DIAGNOSIS — R10.2 PELVIC PRESSURE IN FEMALE: ICD-10-CM

## 2025-07-29 DIAGNOSIS — R74.01 ALT (SGPT) LEVEL RAISED: ICD-10-CM

## 2025-07-29 PROCEDURE — 1101F PT FALLS ASSESS-DOCD LE1/YR: CPT | Mod: CPTII,HCNC,S$GLB, | Performed by: FAMILY MEDICINE

## 2025-07-29 PROCEDURE — 3288F FALL RISK ASSESSMENT DOCD: CPT | Mod: CPTII,HCNC,S$GLB, | Performed by: FAMILY MEDICINE

## 2025-07-29 PROCEDURE — 3008F BODY MASS INDEX DOCD: CPT | Mod: CPTII,HCNC,S$GLB, | Performed by: FAMILY MEDICINE

## 2025-07-29 PROCEDURE — 99999 PR PBB SHADOW E&M-EST. PATIENT-LVL IV: CPT | Mod: PBBFAC,HCNC,, | Performed by: FAMILY MEDICINE

## 2025-07-29 PROCEDURE — 3078F DIAST BP <80 MM HG: CPT | Mod: CPTII,HCNC,S$GLB, | Performed by: FAMILY MEDICINE

## 2025-07-29 PROCEDURE — 1125F AMNT PAIN NOTED PAIN PRSNT: CPT | Mod: CPTII,HCNC,S$GLB, | Performed by: FAMILY MEDICINE

## 2025-07-29 PROCEDURE — 1160F RVW MEDS BY RX/DR IN RCRD: CPT | Mod: CPTII,HCNC,S$GLB, | Performed by: FAMILY MEDICINE

## 2025-07-29 PROCEDURE — 3074F SYST BP LT 130 MM HG: CPT | Mod: CPTII,HCNC,S$GLB, | Performed by: FAMILY MEDICINE

## 2025-07-29 PROCEDURE — 1159F MED LIST DOCD IN RCRD: CPT | Mod: CPTII,HCNC,S$GLB, | Performed by: FAMILY MEDICINE

## 2025-07-29 PROCEDURE — 3044F HG A1C LEVEL LT 7.0%: CPT | Mod: CPTII,HCNC,S$GLB, | Performed by: FAMILY MEDICINE

## 2025-07-29 PROCEDURE — 87077 CULTURE AEROBIC IDENTIFY: CPT | Mod: HCNC | Performed by: FAMILY MEDICINE

## 2025-07-29 PROCEDURE — 99397 PER PM REEVAL EST PAT 65+ YR: CPT | Mod: HCNC,S$GLB,, | Performed by: FAMILY MEDICINE

## 2025-07-29 RX ORDER — ALPRAZOLAM 0.25 MG/1
0.25 TABLET ORAL NIGHTLY
Qty: 30 TABLET | Refills: 0 | Status: SHIPPED | OUTPATIENT
Start: 2025-07-29 | End: 2025-08-28

## 2025-07-29 NOTE — PROGRESS NOTES
Assessment & Plan:    Annual physical exam    Psychophysiological insomnia  -     ALPRAZolam (XANAX) 0.25 MG tablet; Take 1 tablet (0.25 mg total) by mouth every evening.  Dispense: 30 tablet; Refill: 0    Start low dose alprazolam qhs prn for insomnia.    Squamous cell carcinoma of left lung  Adenocarcinoma of left lung  S/P lobectomy of lung    ALT (SGPT) level raised        -     Comprehensive Metabolic Panel; Future; Expected date: 07/29/2025    Patient's pain is improving after her lobectomy and has less of a need for pain medication. Her ALT is mildly elevated, most likely from a combination of NSAIDs and Tylenol. Monitor.     Aortic atherosclerosis  Atherosclerosis of native coronary artery of native heart without angina pectoris  Hypertriglyceridemia  -     Lipid Panel; Future; Expected date: 07/29/2025    Atherosclerosis of the coronary arteries and aorta were found on her most recent CT chest. Triglycerides elevated. Unremarkable stress test and echo.   Advised to start a statin as a preventative measure, which she declined out of concern for side effects. Recommended to take a daily baby ASA and may try red rice yeast/omega-3-FA but she was cautioned that studies comparing these supplements against placebo in the prevention of adverse CV events show little/inconsistent benefit. Repeat labs in 6 months.    Pelvic pressure in female  -     Urine culture    Urine culture collected to evaluate for acute cystitis.     Follow-up: Follow up in about 6 months (around 1/29/2026).  ______________________________________________________________________    Chief Complaint  Chief Complaint   Patient presents with    Annual Exam       HPI  Renata Garcia is a 69 y.o. female with medical diagnoses as listed in the medical history and problem list that presents to the office for an annual exam. She met with her CT Surgeon and Rad/Onc specialist for LLL squamous cell CA and BLAS adenoCA, who plan to treat with radiation  and chemotherapy, followed by immunotherapy. She is worried about how the chemotherapy will affect her quality of life. She has been experiencing insomnia from anxiety about her health. Post-op pain from left lobectomy is improving, trying to avoid pain medications if possible.     Patient c/o return of pelvic pressure over the last 2-3 days, similar to her recent UTI.     Most recent pertinent workup:     Last CBC Results:   Lab Results   Component Value Date    WBC 9.33 07/28/2025    HGB 12.5 07/28/2025    HCT 39.0 07/28/2025     07/28/2025       Last CMP Results  Lab Results   Component Value Date     07/28/2025    K 4.2 07/28/2025     07/28/2025    CO2 27 07/28/2025    BUN 11 07/28/2025    CREATININE 0.7 07/28/2025    CALCIUM 8.8 07/28/2025    ALBUMIN 3.2 (L) 07/28/2025    AST 31 07/28/2025    ALT 56 (H) 07/28/2025       Last Lipids  Lab Results   Component Value Date    CHOL 175 07/28/2025    TRIG 200 (H) 07/28/2025    HDL 37 (L) 07/28/2025    LDLCALC 98.0 07/28/2025       Last A1C  Lab Results   Component Value Date    HGBA1C 5.3 07/28/2025         Health Maintenance         Date Due Completion Date    TETANUS VACCINE Never done ---    Shingles Vaccine (1 of 2) Never done ---    Pneumococcal Vaccines (Age 50+) (1 of 2 - PCV) Never done ---    High Dose Statin Never done ---    RSV Vaccine (Age 60+ and Pregnant patients) (1 - Risk 60-74 years 1-dose series) Never done ---    COVID-19 Vaccine (3 - Pfizer risk series) 11/27/2021 10/30/2021    Mammogram 07/25/2025 7/25/2024    Influenza Vaccine (1) 09/01/2025 ---    DEXA Scan 07/25/2026 7/25/2024    Colorectal Cancer Screening 09/18/2027 9/18/2024    Lipid Panel 07/28/2030 7/28/2025              PAST MEDICAL HISTORY:  History reviewed. No pertinent past medical history.    PAST SURGICAL HISTORY:  Past Surgical History:   Procedure Laterality Date    COLONOSCOPY N/A 09/18/2024    Procedure: COLONOSCOPY;  Surgeon: Benoit Chin MD;  Location:  [facility] ENDO;  Service: Endoscopy;  Laterality: N/A;  8/21 ref  by LEANDRO HERNANDEZ, PEG, instr. to portal-st  9/10/24- lvm/portal for pc. DBM  9/16 pt r/s, PEG, portal.efren    INJECTION OF ANESTHETIC AGENT AROUND MULTIPLE INTERCOSTAL NERVES Left 7/10/2025    Procedure: BLOCK, NERVE, INTERCOSTAL, 2 OR MORE;  Surgeon: Raul Alvarez MD;  Location: [facility] OR 2ND FLR;  Service: Thoracic;  Laterality: Left;    LASER ABLATION OF THE CERVIX      ROBOTIC BRONCHOSCOPY N/A 6/27/2025    Procedure: ROBOTIC BRONCHOSCOPY;  Surgeon: Raquel Yoon MD;  Location: [facility] OR 2ND FLR;  Service: Pulmonary;  Laterality: N/A;    XI ROBOTIC RATS, WITH LYMPHADENECTOMY Left 7/10/2025    Procedure: XI ROBOTIC RATS, WITH LYMPHADENECTOMY;  Surgeon: Raul Alvarez MD;  Location: [facility] OR 2ND FLR;  Service: Thoracic;  Laterality: Left;    XI ROBOTIC RATS,WITH LOBECTOMY,LUNG Left 7/10/2025    Procedure: XI ROBOTIC RATS,WITH LOBECTOMY,LUNG - Left lower lobe;  Surgeon: Raul Alvarez MD;  Location: [facility] OR 2ND FLR;  Service: Thoracic;  Laterality: Left;       SOCIAL HISTORY:  Social History[1]    FAMILY HISTORY:  Family History   Problem Relation Name Age of Onset    Cancer Sister      Hepatitis Sister      Cancer Maternal Uncle         ALLERGIES AND MEDICATIONS: updated and reviewed.  Review of patient's allergies indicates:   Allergen Reactions    Codeine Nausea And Vomiting     Current Medications[2]      ROS  Review of Systems   Constitutional:  Negative for activity change.   Genitourinary:  Negative for dysuria, frequency, pelvic pain and urgency.   Psychiatric/Behavioral:  Positive for sleep disturbance. The patient is nervous/anxious.            Physical Exam  Vitals:    07/29/25 1041   BP: 124/68   Patient Position: Sitting   Pulse: 69   Resp: 18   Temp: 98.2 °F (36.8 °C)   TempSrc: Oral   SpO2: 98%   Weight: 57.8 kg (127 lb 6.8 oz)   Height: 5' 10" (1.778 m)    Body mass index is 18.28 kg/m².  Weight: 57.8 kg (127 lb 6.8 oz) " "  Height: 5' 10" (177.8 cm)   Physical Exam  Constitutional:       General: She is not in acute distress.  HENT:      Head: Normocephalic and atraumatic.   Cardiovascular:      Rate and Rhythm: Normal rate and regular rhythm.      Pulses: Normal pulses.      Heart sounds: Normal heart sounds.   Pulmonary:      Effort: Pulmonary effort is normal. No respiratory distress.      Breath sounds: Normal breath sounds.   Skin:     General: Skin is warm and dry.   Neurological:      General: No focal deficit present.      Mental Status: She is alert and oriented to person, place, and time.   Psychiatric:      Comments: Pleasant but tearful                  [1]   Social History  Socioeconomic History    Marital status:    Tobacco Use    Smoking status: Former     Current packs/day: 0.00     Average packs/day: 0.5 packs/day for 56.0 years (28.0 ttl pk-yrs)     Types: Cigarettes     Start date:      Quit date:      Years since quittin.5     Passive exposure: Current    Smokeless tobacco: Current   Substance and Sexual Activity    Alcohol use: Yes     Comment: occ    Drug use: Never    Sexual activity: Not Currently     Social Drivers of Health     Financial Resource Strain: Low Risk  (2025)    Overall Financial Resource Strain (CARDIA)     Difficulty of Paying Living Expenses: Not hard at all   Food Insecurity: No Food Insecurity (2025)    Hunger Vital Sign     Worried About Running Out of Food in the Last Year: Never true     Ran Out of Food in the Last Year: Never true   Transportation Needs: No Transportation Needs (2025)    PRAPARE - Transportation     Lack of Transportation (Medical): No     Lack of Transportation (Non-Medical): No   Physical Activity: Inactive (2025)    Exercise Vital Sign     Days of Exercise per Week: 0 days     Minutes of Exercise per Session: 0 min   Stress: No Stress Concern Present (2025)    [place] [employer] - Occupational Stress " Questionnaire     Feeling of Stress : Not at all   Recent Concern: Stress - Stress Concern Present (4/23/2025)    [place] [employer] - Occupational Stress Questionnaire     Feeling of Stress : To some extent   Housing Stability: Low Risk  (7/11/2025)    Housing Stability Vital Sign     Unable to Pay for Housing in the Last Year: No     Homeless in the Last Year: No   [2]   Current Outpatient Medications   Medication Sig Dispense Refill    gabapentin (NEURONTIN) 300 MG capsule Take 1 capsule (300 mg total) by mouth 2 (two) times daily. 60 capsule 3    methocarbamoL (ROBAXIN) 500 MG Tab Take 1 tablet (500 mg total) by mouth 4 (four) times daily. for 10 days 40 tablet 0    oxyCODONE (ROXICODONE) 5 MG immediate release tablet Take 1 tablet (5 mg total) by mouth every 4 (four) hours as needed for Pain. 20 tablet 0    ALPRAZolam (XANAX) 0.25 MG tablet Take 1 tablet (0.25 mg total) by mouth every evening. 30 tablet 0     No current facility-administered medications for this visit.

## 2025-07-31 ENCOUNTER — TELEPHONE (OUTPATIENT)
Dept: HEMATOLOGY/ONCOLOGY | Facility: CLINIC | Age: 70
End: 2025-07-31

## 2025-07-31 ENCOUNTER — TELEPHONE (OUTPATIENT)
Dept: HEMATOLOGY/ONCOLOGY | Facility: CLINIC | Age: 70
End: 2025-07-31
Payer: MEDICARE

## 2025-07-31 ENCOUNTER — OFFICE VISIT (OUTPATIENT)
Dept: HEMATOLOGY/ONCOLOGY | Facility: CLINIC | Age: 70
End: 2025-07-31
Payer: MEDICARE

## 2025-07-31 VITALS
BODY MASS INDEX: 18.18 KG/M2 | HEIGHT: 70 IN | TEMPERATURE: 98 F | WEIGHT: 127 LBS | OXYGEN SATURATION: 96 % | SYSTOLIC BLOOD PRESSURE: 107 MMHG | HEART RATE: 82 BPM | DIASTOLIC BLOOD PRESSURE: 69 MMHG

## 2025-07-31 DIAGNOSIS — C34.32 SQUAMOUS CELL CARCINOMA OF LOWER LOBE OF LEFT LUNG: Primary | ICD-10-CM

## 2025-07-31 DIAGNOSIS — J44.9 CHRONIC OBSTRUCTIVE PULMONARY DISEASE, UNSPECIFIED COPD TYPE: ICD-10-CM

## 2025-07-31 DIAGNOSIS — F17.200 NICOTINE DEPENDENCE, UNCOMPLICATED, UNSPECIFIED NICOTINE PRODUCT TYPE: ICD-10-CM

## 2025-07-31 DIAGNOSIS — R45.89 ANXIETY ABOUT TREATMENT: ICD-10-CM

## 2025-07-31 LAB — BACTERIA UR CULT: ABNORMAL

## 2025-07-31 PROCEDURE — 3008F BODY MASS INDEX DOCD: CPT | Mod: CPTII,HCNC,S$GLB, | Performed by: INTERNAL MEDICINE

## 2025-07-31 PROCEDURE — 3074F SYST BP LT 130 MM HG: CPT | Mod: CPTII,HCNC,S$GLB, | Performed by: INTERNAL MEDICINE

## 2025-07-31 PROCEDURE — 1101F PT FALLS ASSESS-DOCD LE1/YR: CPT | Mod: CPTII,HCNC,S$GLB, | Performed by: INTERNAL MEDICINE

## 2025-07-31 PROCEDURE — 3078F DIAST BP <80 MM HG: CPT | Mod: CPTII,HCNC,S$GLB, | Performed by: INTERNAL MEDICINE

## 2025-07-31 PROCEDURE — 99999 PR PBB SHADOW E&M-EST. PATIENT-LVL III: CPT | Mod: PBBFAC,HCNC,, | Performed by: INTERNAL MEDICINE

## 2025-07-31 PROCEDURE — 1125F AMNT PAIN NOTED PAIN PRSNT: CPT | Mod: CPTII,HCNC,S$GLB, | Performed by: INTERNAL MEDICINE

## 2025-07-31 PROCEDURE — 99215 OFFICE O/P EST HI 40 MIN: CPT | Mod: HCNC,S$GLB,, | Performed by: INTERNAL MEDICINE

## 2025-07-31 PROCEDURE — 3044F HG A1C LEVEL LT 7.0%: CPT | Mod: CPTII,HCNC,S$GLB, | Performed by: INTERNAL MEDICINE

## 2025-07-31 PROCEDURE — 3288F FALL RISK ASSESSMENT DOCD: CPT | Mod: CPTII,HCNC,S$GLB, | Performed by: INTERNAL MEDICINE

## 2025-07-31 PROCEDURE — 1111F DSCHRG MED/CURRENT MED MERGE: CPT | Mod: CPTII,HCNC,S$GLB, | Performed by: INTERNAL MEDICINE

## 2025-07-31 NOTE — TELEPHONE ENCOUNTER
Phone call to patient after speaking with Dr. Moore that her [facility] testing was denied by her insurance. Financial Assistance with [employer] completed and patient was approved with 0 out-of-pocket cost. [facility] blood draw scheduled and labs combined with Dr. Moore ordered CBC and CMP so patient only has one blood draw. Patient had no other questions at this time.

## 2025-07-31 NOTE — TELEPHONE ENCOUNTER
[email or web address]     Pt needs to fill this form out for [employer]

## 2025-08-01 ENCOUNTER — TELEPHONE (OUTPATIENT)
Dept: FAMILY MEDICINE | Facility: CLINIC | Age: 70
End: 2025-08-01
Payer: MEDICARE

## 2025-08-01 DIAGNOSIS — R39.89 SUSPECTED UTI: Primary | ICD-10-CM

## 2025-08-01 NOTE — TELEPHONE ENCOUNTER
Copied from CRM #[id number]. Topic: General Inquiry - Patient Advice  >> Aug 1, 2025  4:31 PM Med Assistant Dianna wrote:  Type:  Needs Medical Advice    Who Called: Pt   Symptoms (please be specific): abnormal urine culture    How long has patient had these symptoms:  week   Pharmacy name and phone #:    [id number] - [place] - [facility] AT [facility] [address]  [address]  [place] [address]  Phone: [phone] Fax: [phone]     Would the patient rather a call back or a response via [facility]? Callback   Best Call Back Number: Telephone Information:  Mobile          [phone]     Additional Information: requesting a antibiotic .

## 2025-08-02 RX ORDER — AMOXICILLIN AND CLAVULANATE POTASSIUM 500; 125 MG/1; MG/1
1 TABLET, FILM COATED ORAL 2 TIMES DAILY
Qty: 10 TABLET | Refills: 0 | Status: SHIPPED | OUTPATIENT
Start: 2025-08-02 | End: 2025-08-07

## 2025-08-04 ENCOUNTER — TELEPHONE (OUTPATIENT)
Dept: INTERVENTIONAL RADIOLOGY/VASCULAR | Facility: CLINIC | Age: 70
End: 2025-08-04
Payer: MEDICARE

## 2025-08-04 ENCOUNTER — HOSPITAL ENCOUNTER (OUTPATIENT)
Dept: RADIATION THERAPY | Facility: HOSPITAL | Age: 70
Discharge: HOME | End: 2025-08-04
Payer: MEDICARE

## 2025-08-04 ENCOUNTER — LAB VISIT (OUTPATIENT)
Dept: LAB | Facility: HOSPITAL | Age: 70
End: 2025-08-04
Attending: INTERNAL MEDICINE
Payer: MEDICARE

## 2025-08-04 DIAGNOSIS — C34.32 SQUAMOUS CELL CARCINOMA OF LOWER LOBE OF LEFT LUNG: ICD-10-CM

## 2025-08-04 LAB
ABSOLUTE EOSINOPHIL (OHS): 0.29 K/UL
ABSOLUTE MONOCYTE (OHS): 0.97 K/UL (ref 0.3–1)
ABSOLUTE NEUTROPHIL COUNT (OHS): 6.61 K/UL (ref 1.8–7.7)
ALBUMIN SERPL BCP-MCNC: 3.6 G/DL (ref 3.5–5.2)
ALP SERPL-CCNC: 98 UNIT/L (ref 40–150)
ALT SERPL W/O P-5'-P-CCNC: 21 UNIT/L (ref 0–55)
ANION GAP (OHS): 10 MMOL/L (ref 8–16)
AST SERPL-CCNC: 18 UNIT/L (ref 0–50)
BASOPHILS # BLD AUTO: 0.05 K/UL
BASOPHILS NFR BLD AUTO: 0.5 %
BILIRUB SERPL-MCNC: 0.3 MG/DL (ref 0.1–1)
BUN SERPL-MCNC: 15 MG/DL (ref 8–23)
CALCIUM SERPL-MCNC: 9.2 MG/DL (ref 8.7–10.5)
CHLORIDE SERPL-SCNC: 104 MMOL/L (ref 95–110)
CO2 SERPL-SCNC: 30 MMOL/L (ref 23–29)
CREAT SERPL-MCNC: 0.7 MG/DL (ref 0.5–1.4)
ERYTHROCYTE [DISTWIDTH] IN BLOOD BY AUTOMATED COUNT: 12.5 % (ref 11.5–14.5)
GFR SERPLBLD CREATININE-BSD FMLA CKD-EPI: >60 ML/MIN/1.73/M2
GLUCOSE SERPL-MCNC: 80 MG/DL (ref 70–110)
HCT VFR BLD AUTO: 39.6 % (ref 37–48.5)
HGB BLD-MCNC: 12.7 GM/DL (ref 12–16)
IMM GRANULOCYTES # BLD AUTO: 0.03 K/UL (ref 0–0.04)
IMM GRANULOCYTES NFR BLD AUTO: 0.3 % (ref 0–0.5)
LYMPHOCYTES # BLD AUTO: 2.01 K/UL (ref 1–4.8)
MCH RBC QN AUTO: 29.3 PG (ref 27–31)
MCHC RBC AUTO-ENTMCNC: 32.1 G/DL (ref 32–36)
MCV RBC AUTO: 91 FL (ref 82–98)
NUCLEATED RBC (/100WBC) (OHS): 0 /100 WBC
PLATELET # BLD AUTO: 316 K/UL (ref 150–450)
PMV BLD AUTO: 10.8 FL (ref 9.2–12.9)
POTASSIUM SERPL-SCNC: 4.2 MMOL/L (ref 3.5–5.1)
PROT SERPL-MCNC: 6.9 GM/DL (ref 6–8.4)
RBC # BLD AUTO: 4.34 M/UL (ref 4–5.4)
RELATIVE EOSINOPHIL (OHS): 2.9 %
RELATIVE LYMPHOCYTE (OHS): 20.2 % (ref 18–48)
RELATIVE MONOCYTE (OHS): 9.7 % (ref 4–15)
RELATIVE NEUTROPHIL (OHS): 66.4 % (ref 38–73)
SODIUM SERPL-SCNC: 144 MMOL/L (ref 136–145)
WBC # BLD AUTO: 9.96 K/UL (ref 3.9–12.7)

## 2025-08-04 PROCEDURE — 77334 RADIATION TREATMENT AID(S): CPT | Mod: TC,HCNC | Performed by: STUDENT IN AN ORGANIZED HEALTH CARE EDUCATION/TRAINING PROGRAM

## 2025-08-04 PROCEDURE — 77014 HC CT GUIDANCE RADIATION THERAPY FLDS PLACEMENT: CPT | Mod: TC,HCNC | Performed by: STUDENT IN AN ORGANIZED HEALTH CARE EDUCATION/TRAINING PROGRAM

## 2025-08-04 PROCEDURE — 77263 THER RADIOLOGY TX PLNG CPLX: CPT | Mod: HCNC,,, | Performed by: STUDENT IN AN ORGANIZED HEALTH CARE EDUCATION/TRAINING PROGRAM

## 2025-08-04 PROCEDURE — 77334 RADIATION TREATMENT AID(S): CPT | Mod: 26,HCNC,, | Performed by: STUDENT IN AN ORGANIZED HEALTH CARE EDUCATION/TRAINING PROGRAM

## 2025-08-04 PROCEDURE — 82040 ASSAY OF SERUM ALBUMIN: CPT | Mod: HCNC

## 2025-08-04 PROCEDURE — 85025 COMPLETE CBC W/AUTO DIFF WBC: CPT | Mod: HCNC

## 2025-08-04 PROCEDURE — 77014 PR  CT GUIDANCE PLACEMENT RAD THERAPY FIELDS: CPT | Mod: 26,HCNC,, | Performed by: STUDENT IN AN ORGANIZED HEALTH CARE EDUCATION/TRAINING PROGRAM

## 2025-08-04 PROCEDURE — 36415 COLL VENOUS BLD VENIPUNCTURE: CPT | Mod: HCNC

## 2025-08-08 ENCOUNTER — RESULTS FOLLOW-UP (OUTPATIENT)
Dept: CARDIOLOGY | Facility: CLINIC | Age: 70
End: 2025-08-08
Payer: MEDICARE

## 2025-08-11 ENCOUNTER — TELEPHONE (OUTPATIENT)
Dept: INTERVENTIONAL RADIOLOGY/VASCULAR | Facility: CLINIC | Age: 70
End: 2025-08-11
Payer: MEDICARE

## 2025-08-12 ENCOUNTER — HOSPITAL ENCOUNTER (OUTPATIENT)
Dept: RADIOLOGY | Facility: HOSPITAL | Age: 70
Discharge: HOME | End: 2025-08-12
Attending: FAMILY MEDICINE
Payer: MEDICARE

## 2025-08-12 DIAGNOSIS — Z12.31 SCREENING MAMMOGRAM FOR BREAST CANCER: ICD-10-CM

## 2025-08-12 PROCEDURE — 77067 SCR MAMMO BI INCL CAD: CPT | Mod: TC,HCNC,PO

## 2025-08-14 ENCOUNTER — TELEPHONE (OUTPATIENT)
Dept: HEMATOLOGY/ONCOLOGY | Facility: CLINIC | Age: 70
End: 2025-08-14
Payer: MEDICARE

## 2025-08-18 ENCOUNTER — E-VISIT (OUTPATIENT)
Dept: FAMILY MEDICINE | Facility: CLINIC | Age: 70
End: 2025-08-18
Payer: MEDICARE

## 2025-08-18 ENCOUNTER — TELEPHONE (OUTPATIENT)
Dept: INTERVENTIONAL RADIOLOGY/VASCULAR | Facility: CLINIC | Age: 70
End: 2025-08-18
Payer: MEDICARE

## 2025-08-18 ENCOUNTER — LAB VISIT (OUTPATIENT)
Dept: LAB | Facility: HOSPITAL | Age: 70
End: 2025-08-18
Attending: FAMILY MEDICINE
Payer: MEDICARE

## 2025-08-18 DIAGNOSIS — C34.90 MALIGNANT NEOPLASM OF UNSPECIFIED PART OF UNSPECIFIED BRONCHUS OR LUNG: Primary | ICD-10-CM

## 2025-08-18 DIAGNOSIS — N39.0 RECURRENT UTI: Primary | ICD-10-CM

## 2025-08-18 DIAGNOSIS — N39.0 RECURRENT UTI: ICD-10-CM

## 2025-08-18 PROCEDURE — 87086 URINE CULTURE/COLONY COUNT: CPT | Mod: HCNC

## 2025-08-18 RX ORDER — AMOXICILLIN AND CLAVULANATE POTASSIUM 500; 125 MG/1; MG/1
1 TABLET, FILM COATED ORAL 2 TIMES DAILY
Qty: 14 TABLET | Refills: 0 | Status: SHIPPED | OUTPATIENT
Start: 2025-08-18 | End: 2025-08-25

## 2025-08-20 LAB — BACTERIA UR CULT: ABNORMAL

## 2025-08-21 ENCOUNTER — OFFICE VISIT (OUTPATIENT)
Dept: HEMATOLOGY/ONCOLOGY | Facility: CLINIC | Age: 70
End: 2025-08-21
Payer: MEDICARE

## 2025-08-21 VITALS
SYSTOLIC BLOOD PRESSURE: 136 MMHG | DIASTOLIC BLOOD PRESSURE: 79 MMHG | WEIGHT: 127 LBS | HEIGHT: 70 IN | TEMPERATURE: 99 F | OXYGEN SATURATION: 98 % | HEART RATE: 70 BPM | BODY MASS INDEX: 18.18 KG/M2

## 2025-08-21 DIAGNOSIS — C34.32 SQUAMOUS CELL CARCINOMA OF LOWER LOBE OF LEFT LUNG: Primary | ICD-10-CM

## 2025-08-21 DIAGNOSIS — R45.89 ANXIETY ABOUT TREATMENT: ICD-10-CM

## 2025-08-21 DIAGNOSIS — R11.2 CHEMOTHERAPY-INDUCED NAUSEA AND VOMITING: ICD-10-CM

## 2025-08-21 DIAGNOSIS — J44.9 CHRONIC OBSTRUCTIVE PULMONARY DISEASE, UNSPECIFIED COPD TYPE: ICD-10-CM

## 2025-08-21 DIAGNOSIS — T45.1X5A CHEMOTHERAPY-INDUCED NAUSEA AND VOMITING: ICD-10-CM

## 2025-08-21 PROCEDURE — 3078F DIAST BP <80 MM HG: CPT | Mod: CPTII,HCNC,S$GLB, | Performed by: INTERNAL MEDICINE

## 2025-08-21 PROCEDURE — 3075F SYST BP GE 130 - 139MM HG: CPT | Mod: CPTII,HCNC,S$GLB, | Performed by: INTERNAL MEDICINE

## 2025-08-21 PROCEDURE — 1101F PT FALLS ASSESS-DOCD LE1/YR: CPT | Mod: CPTII,HCNC,S$GLB, | Performed by: INTERNAL MEDICINE

## 2025-08-21 PROCEDURE — 1126F AMNT PAIN NOTED NONE PRSNT: CPT | Mod: CPTII,HCNC,S$GLB, | Performed by: INTERNAL MEDICINE

## 2025-08-21 PROCEDURE — 99215 OFFICE O/P EST HI 40 MIN: CPT | Mod: HCNC,S$GLB,, | Performed by: INTERNAL MEDICINE

## 2025-08-21 PROCEDURE — 3008F BODY MASS INDEX DOCD: CPT | Mod: CPTII,HCNC,S$GLB, | Performed by: INTERNAL MEDICINE

## 2025-08-21 PROCEDURE — 3044F HG A1C LEVEL LT 7.0%: CPT | Mod: CPTII,HCNC,S$GLB, | Performed by: INTERNAL MEDICINE

## 2025-08-21 PROCEDURE — 99999 PR PBB SHADOW E&M-EST. PATIENT-LVL IV: CPT | Mod: PBBFAC,HCNC,, | Performed by: INTERNAL MEDICINE

## 2025-08-21 PROCEDURE — 3288F FALL RISK ASSESSMENT DOCD: CPT | Mod: CPTII,HCNC,S$GLB, | Performed by: INTERNAL MEDICINE

## 2025-08-21 RX ORDER — OLANZAPINE 5 MG/1
TABLET, FILM COATED ORAL
Qty: 4 TABLET | Refills: 3 | Status: SHIPPED | OUTPATIENT
Start: 2025-08-21

## 2025-08-21 RX ORDER — PROCHLORPERAZINE MALEATE 5 MG
TABLET ORAL
Qty: 30 TABLET | Refills: 1 | Status: SHIPPED | OUTPATIENT
Start: 2025-08-21

## 2025-08-21 RX ORDER — ONDANSETRON 4 MG/1
4 TABLET, FILM COATED ORAL EVERY 8 HOURS PRN
Qty: 60 TABLET | Refills: 1 | Status: SHIPPED | OUTPATIENT
Start: 2025-08-21

## 2025-08-21 RX ORDER — DEXAMETHASONE 4 MG/1
TABLET ORAL
Qty: 8 TABLET | Refills: 3 | Status: SHIPPED | OUTPATIENT
Start: 2025-08-21

## 2025-08-22 ENCOUNTER — TELEPHONE (OUTPATIENT)
Dept: HEMATOLOGY/ONCOLOGY | Facility: CLINIC | Age: 70
End: 2025-08-22
Payer: MEDICARE

## 2025-08-22 ENCOUNTER — TELEPHONE (OUTPATIENT)
Dept: PSYCHIATRY | Facility: CLINIC | Age: 70
End: 2025-08-22
Payer: MEDICARE

## 2025-08-25 ENCOUNTER — HOSPITAL ENCOUNTER (OUTPATIENT)
Dept: PREADMISSION TESTING | Facility: HOSPITAL | Age: 70
Discharge: HOME | End: 2025-08-25
Attending: INTERNAL MEDICINE
Payer: MEDICARE

## 2025-08-25 ENCOUNTER — TELEPHONE (OUTPATIENT)
Dept: PREADMISSION TESTING | Facility: HOSPITAL | Age: 70
End: 2025-08-25
Payer: MEDICARE

## 2025-08-25 ENCOUNTER — TELEPHONE (OUTPATIENT)
Dept: HEMATOLOGY/ONCOLOGY | Facility: CLINIC | Age: 70
End: 2025-08-25
Payer: MEDICARE

## 2025-08-25 VITALS — BODY MASS INDEX: 18.22 KG/M2 | WEIGHT: 127 LBS

## 2025-08-28 ENCOUNTER — HOSPITAL ENCOUNTER (OUTPATIENT)
Dept: INTERVENTIONAL RADIOLOGY/VASCULAR | Facility: HOSPITAL | Age: 70
Discharge: HOME | End: 2025-08-28
Attending: INTERNAL MEDICINE
Payer: MEDICARE

## 2025-08-28 VITALS
DIASTOLIC BLOOD PRESSURE: 69 MMHG | TEMPERATURE: 98 F | RESPIRATION RATE: 13 BRPM | OXYGEN SATURATION: 95 % | SYSTOLIC BLOOD PRESSURE: 125 MMHG | HEART RATE: 65 BPM

## 2025-08-28 DIAGNOSIS — C34.90 LUNG CANCER: ICD-10-CM

## 2025-08-28 DIAGNOSIS — C34.32 SQUAMOUS CELL CARCINOMA OF LOWER LOBE OF LEFT LUNG: ICD-10-CM

## 2025-08-28 LAB
ABSOLUTE EOSINOPHIL (OHS): 0.19 K/UL
ABSOLUTE MONOCYTE (OHS): 0.6 K/UL (ref 0.3–1)
ABSOLUTE NEUTROPHIL COUNT (OHS): 3.86 K/UL (ref 1.8–7.7)
ANION GAP (OHS): 7 MMOL/L (ref 8–16)
BASOPHILS # BLD AUTO: 0.05 K/UL
BASOPHILS NFR BLD AUTO: 0.8 %
BILIRUB UR QL STRIP.AUTO: NEGATIVE
BUN SERPL-MCNC: 13 MG/DL (ref 8–23)
CALCIUM SERPL-MCNC: 9.1 MG/DL (ref 8.7–10.5)
CHLORIDE SERPL-SCNC: 107 MMOL/L (ref 95–110)
CLARITY UR: CLEAR
CO2 SERPL-SCNC: 25 MMOL/L (ref 23–29)
COLOR UR AUTO: YELLOW
CREAT SERPL-MCNC: 0.6 MG/DL (ref 0.5–1.4)
ERYTHROCYTE [DISTWIDTH] IN BLOOD BY AUTOMATED COUNT: 13.2 % (ref 11.5–14.5)
GFR SERPLBLD CREATININE-BSD FMLA CKD-EPI: >60 ML/MIN/1.73/M2
GLUCOSE SERPL-MCNC: 102 MG/DL (ref 70–110)
GLUCOSE UR QL STRIP: NEGATIVE
HCT VFR BLD AUTO: 40.1 % (ref 37–48.5)
HGB BLD-MCNC: 12.9 GM/DL (ref 12–16)
HGB UR QL STRIP: ABNORMAL
IMM GRANULOCYTES # BLD AUTO: 0.01 K/UL (ref 0–0.04)
IMM GRANULOCYTES NFR BLD AUTO: 0.2 % (ref 0–0.5)
KETONES UR QL STRIP: NEGATIVE
LEUKOCYTE ESTERASE UR QL STRIP: ABNORMAL
LYMPHOCYTES # BLD AUTO: 1.6 K/UL (ref 1–4.8)
MCH RBC QN AUTO: 28.5 PG (ref 27–31)
MCHC RBC AUTO-ENTMCNC: 32.2 G/DL (ref 32–36)
MCV RBC AUTO: 89 FL (ref 82–98)
MICROSCOPIC COMMENT: NORMAL
NITRITE UR QL STRIP: NEGATIVE
NUCLEATED RBC (/100WBC) (OHS): 0 /100 WBC
PH UR STRIP: 6 [PH]
PLATELET # BLD AUTO: NORMAL 10*3/UL
PMV BLD AUTO: NORMAL FL
POTASSIUM SERPL-SCNC: 3.8 MMOL/L (ref 3.5–5.1)
PROT UR QL STRIP: NEGATIVE
RBC # BLD AUTO: 4.53 M/UL (ref 4–5.4)
RBC #/AREA URNS AUTO: 1 /HPF (ref 0–4)
RELATIVE EOSINOPHIL (OHS): 3 %
RELATIVE LYMPHOCYTE (OHS): 25.4 % (ref 18–48)
RELATIVE MONOCYTE (OHS): 9.5 % (ref 4–15)
RELATIVE NEUTROPHIL (OHS): 61.1 % (ref 38–73)
SODIUM SERPL-SCNC: 139 MMOL/L (ref 136–145)
SP GR UR STRIP: 1.02
SQUAMOUS #/AREA URNS AUTO: 1 /HPF
UROBILINOGEN UR STRIP-ACNC: NEGATIVE EU/DL
WBC # BLD AUTO: 6.31 K/UL (ref 3.9–12.7)
WBC #/AREA URNS AUTO: 1 /HPF (ref 0–5)

## 2025-08-28 PROCEDURE — 63600175 PHARM REV CODE 636 W HCPCS: Mod: HCNC | Performed by: INTERNAL MEDICINE

## 2025-08-28 PROCEDURE — 84295 ASSAY OF SERUM SODIUM: CPT | Mod: HCNC | Performed by: INTERNAL MEDICINE

## 2025-08-28 PROCEDURE — 85025 COMPLETE CBC W/AUTO DIFF WBC: CPT | Mod: HCNC | Performed by: INTERNAL MEDICINE

## 2025-08-28 PROCEDURE — 25000003 PHARM REV CODE 250: Mod: HCNC | Performed by: NURSE PRACTITIONER

## 2025-08-28 PROCEDURE — 81003 URINALYSIS AUTO W/O SCOPE: CPT | Mod: HCNC | Performed by: INTERNAL MEDICINE

## 2025-08-28 RX ORDER — LIDOCAINE HYDROCHLORIDE 10 MG/ML
INJECTION, SOLUTION INFILTRATION; PERINEURAL
Status: COMPLETED | OUTPATIENT
Start: 2025-08-28 | End: 2025-08-28

## 2025-08-28 RX ORDER — MIDAZOLAM HYDROCHLORIDE 2 MG/2ML
INJECTION, SOLUTION INTRAMUSCULAR; INTRAVENOUS
Status: COMPLETED | OUTPATIENT
Start: 2025-08-28 | End: 2025-08-28

## 2025-08-28 RX ORDER — SODIUM CHLORIDE 9 MG/ML
INJECTION, SOLUTION INTRAVENOUS CONTINUOUS
Status: DISCONTINUED | OUTPATIENT
Start: 2025-08-28 | End: 2025-08-29 | Stop reason: HOSPADM

## 2025-08-28 RX ORDER — HEPARIN 100 UNIT/ML
SYRINGE INTRAVENOUS
Status: COMPLETED | OUTPATIENT
Start: 2025-08-28 | End: 2025-08-28

## 2025-08-28 RX ORDER — LIDOCAINE HYDROCHLORIDE AND EPINEPHRINE 10; 10 UG/ML; MG/ML
INJECTION, SOLUTION INFILTRATION; PERINEURAL
Status: COMPLETED | OUTPATIENT
Start: 2025-08-28 | End: 2025-08-28

## 2025-08-28 RX ORDER — LIDOCAINE HYDROCHLORIDE 10 MG/ML
1 INJECTION, SOLUTION EPIDURAL; INFILTRATION; INTRACAUDAL; PERINEURAL ONCE
Status: DISCONTINUED | OUTPATIENT
Start: 2025-08-28 | End: 2025-08-29 | Stop reason: HOSPADM

## 2025-08-28 RX ORDER — FENTANYL CITRATE 50 UG/ML
INJECTION, SOLUTION INTRAMUSCULAR; INTRAVENOUS
Status: COMPLETED | OUTPATIENT
Start: 2025-08-28 | End: 2025-08-28

## 2025-08-28 RX ADMIN — FENTANYL CITRATE 50 MCG: 50 INJECTION, SOLUTION INTRAMUSCULAR; INTRAVENOUS at 11:08

## 2025-08-28 RX ADMIN — LIDOCAINE HYDROCHLORIDE,EPINEPHRINE BITARTRATE 20 ML: 10; .01 INJECTION, SOLUTION INFILTRATION; PERINEURAL at 11:08

## 2025-08-28 RX ADMIN — SODIUM CHLORIDE 500 ML: 9 INJECTION, SOLUTION INTRAVENOUS at 09:08

## 2025-08-28 RX ADMIN — LIDOCAINE HYDROCHLORIDE 10 ML: 10 INJECTION, SOLUTION INFILTRATION; PERINEURAL at 11:08

## 2025-08-28 RX ADMIN — MIDAZOLAM HYDROCHLORIDE 1 MG: 1 INJECTION, SOLUTION INTRAMUSCULAR; INTRAVENOUS at 11:08

## 2025-08-28 RX ADMIN — HEPARIN 5 ML: 100 SYRINGE at 11:08

## 2025-08-29 LAB — HOLD SPECIMEN: NORMAL

## 2025-09-02 ENCOUNTER — CLINICAL SUPPORT (OUTPATIENT)
Dept: AUDIOLOGY | Facility: CLINIC | Age: 70
End: 2025-09-02
Payer: MEDICARE

## 2025-09-02 ENCOUNTER — TELEPHONE (OUTPATIENT)
Dept: INTERVENTIONAL RADIOLOGY/VASCULAR | Facility: HOSPITAL | Age: 70
End: 2025-09-02
Payer: MEDICARE

## 2025-09-02 DIAGNOSIS — H90.3 SENSORINEURAL HEARING LOSS (SNHL) OF BOTH EARS: Primary | ICD-10-CM

## 2025-09-05 ENCOUNTER — TELEPHONE (OUTPATIENT)
Dept: FAMILY MEDICINE | Facility: CLINIC | Age: 70
End: 2025-09-05

## 2025-09-05 ENCOUNTER — TELEPHONE (OUTPATIENT)
Dept: INTERVENTIONAL RADIOLOGY/VASCULAR | Facility: HOSPITAL | Age: 70
End: 2025-09-05
Payer: MEDICARE

## 2025-09-05 ENCOUNTER — E-VISIT (OUTPATIENT)
Dept: FAMILY MEDICINE | Facility: CLINIC | Age: 70
End: 2025-09-05
Payer: MEDICARE

## 2025-09-05 ENCOUNTER — PATIENT MESSAGE (OUTPATIENT)
Dept: FAMILY MEDICINE | Facility: CLINIC | Age: 70
End: 2025-09-05

## 2025-09-05 ENCOUNTER — TELEPHONE (OUTPATIENT)
Dept: HEMATOLOGY/ONCOLOGY | Facility: CLINIC | Age: 70
End: 2025-09-05
Payer: MEDICARE

## 2025-09-05 ENCOUNTER — TELEPHONE (OUTPATIENT)
Dept: FAMILY MEDICINE | Facility: CLINIC | Age: 70
End: 2025-09-05
Payer: MEDICARE

## 2025-09-05 DIAGNOSIS — R10.2 PELVIC PRESSURE IN FEMALE: Primary | ICD-10-CM

## 2025-09-05 DIAGNOSIS — A49.1 STREPTOCOCCUS VIRIDANS INFECTION: Primary | ICD-10-CM

## (undated) DEVICE — SYR ONLY LUER LOCK 20CC

## (undated) DEVICE — KIT ANTIFOG W/SPONG & FLUID

## (undated) DEVICE — RELOAD ENDO GIA TRISTAPLE 45MM

## (undated) DEVICE — DRESSING TRANS 2X2 TEGADERM

## (undated) DEVICE — ADAPTER VISION PROBE & SUCTION

## (undated) DEVICE — PACK ECLIPSE SET-UP W/O DRAPE

## (undated) DEVICE — DRAPE COLUMN DAVINCI XI

## (undated) DEVICE — CANNULA SEAL 12MM

## (undated) DEVICE — ELECTRODE REM PLYHSV RETURN 9

## (undated) DEVICE — CONTAINER SPECIMEN OR STER 4OZ

## (undated) DEVICE — ELECTRODE BLADE INSULATED 1 IN

## (undated) DEVICE — SUT 2-0 VICRYL / CT-1

## (undated) DEVICE — GAUZE DRAIN N WVN 6PLY 4X4IN

## (undated) DEVICE — SUT MONOCRYL UD 4-0 27 PS-1

## (undated) DEVICE — CANNULA REDUCER 12-8MM

## (undated) DEVICE — SUT 0 30IN ETHIBOND EXCEL G

## (undated) DEVICE — HEMOSTAT SURGICEL NUKNIT 3X4IN

## (undated) DEVICE — RELOAD SUREFORM 45 3.5 BLU 6R

## (undated) DEVICE — TAPE SILK 3IN

## (undated) DEVICE — PORT AIRSEAL 12/120MM LPI

## (undated) DEVICE — DRAPE THREE-QTR REINF 53X77IN

## (undated) DEVICE — SET TRI-LUMEN FILTERED TUBE

## (undated) DEVICE — COVER LIGHT HANDLE

## (undated) DEVICE — DRAIN CHEST DRY SUCTION

## (undated) DEVICE — NDL HYPO REG 25G X 1 1/2

## (undated) DEVICE — PENCIL SMK EVAC CONNECTOR 10FT

## (undated) DEVICE — SOL WATER STRL IRR 1000ML

## (undated) DEVICE — ADAPTER SWIVEL

## (undated) DEVICE — NDL FLTR 5MCRN BLNT TIP 18GX1

## (undated) DEVICE — BOWL STERILE LARGE 32OZ

## (undated) DEVICE — DRESSING SURGICAL 1X3

## (undated) DEVICE — SYR SLIP TIP 20CC

## (undated) DEVICE — SPONGE COTTON TRAY 4X4IN

## (undated) DEVICE — SUT 0 VICRYL / CT-1

## (undated) DEVICE — GOWN POLY REINF BRTH SLV XL

## (undated) DEVICE — SYR 10CC LUER LOCK

## (undated) DEVICE — SUT SILK 0 STRANDS 30IN BLK

## (undated) DEVICE — RELOAD SUREFORM 45 2.5 WHT 6R

## (undated) DEVICE — LOOP VESSEL BLUE MAXI

## (undated) DEVICE — TUBING SUC UNIV W/CONN 12FT

## (undated) DEVICE — SUT MCRYL PLUS 4-0 PS2 27IN

## (undated) DEVICE — BAG INZII TISS RETRV 12/15MM

## (undated) DEVICE — TUBING SPECLM SMOKE EVAC 10MM

## (undated) DEVICE — DRAPE INCISE IOBAN 2 23X17IN

## (undated) DEVICE — PENCIL GOLF STERILE

## (undated) DEVICE — NDL SPINAL 18GX3.5 SPINOCAN

## (undated) DEVICE — TRAY MINOR GEN SURG OMC

## (undated) DEVICE — SEAL UNIVERSAL 5MM-8MM XI

## (undated) DEVICE — GOWN SMART IMP BREATHABLE XXLG

## (undated) DEVICE — CATH THORACIC 28FR ST

## (undated) DEVICE — DRESSING TRANS 4X4 TEGADERM

## (undated) DEVICE — ELECTRODE MEGADYNE RETURN DUAL

## (undated) DEVICE — SYS LABEL CORRECT MED

## (undated) DEVICE — RELOAD SUREFORM 45 4.3 GRN 6R

## (undated) DEVICE — DRAIN CHAN RND HUBLS 8MM 24FR

## (undated) DEVICE — OBTURATOR BLADELESS 8MM XI CLR

## (undated) DEVICE — Device

## (undated) DEVICE — STAPLER SUREFORM SGL USE 45

## (undated) DEVICE — DRAPE SCOPE PILLOW WARMER

## (undated) DEVICE — SET TUBE DAVINCI 5 HI FLOW INS

## (undated) DEVICE — STAPLER GIA HANDLE STD

## (undated) DEVICE — DRAPE ARM DAVINCI XI

## (undated) DEVICE — SEALER MARYLAND 1 STEP 37CM

## (undated) DEVICE — BAG ION VISION PROBE

## (undated) DEVICE — PENCIL ROCKER SWITCH 10FT CORD

## (undated) DEVICE — NDL ASPIRATING VIZISHOT 20-40M

## (undated) DEVICE — SUT VICRYL PLUS 2-0 CT1 18

## (undated) DEVICE — CONNECTOR SWIVEL

## (undated) DEVICE — DRAPE ABDOMINAL TIBURON 14X11